# Patient Record
Sex: MALE | Race: WHITE | Employment: FULL TIME | URBAN - METROPOLITAN AREA
[De-identification: names, ages, dates, MRNs, and addresses within clinical notes are randomized per-mention and may not be internally consistent; named-entity substitution may affect disease eponyms.]

---

## 2019-12-04 ENCOUNTER — LAB ENCOUNTER (OUTPATIENT)
Dept: LAB | Age: 59
End: 2019-12-04
Attending: DERMATOLOGY
Payer: COMMERCIAL

## 2019-12-04 DIAGNOSIS — L40.0 PSORIASIS VULGARIS: Primary | ICD-10-CM

## 2019-12-04 DIAGNOSIS — Z79.899 ENCOUNTER FOR LONG-TERM (CURRENT) USE OF OTHER MEDICATIONS: ICD-10-CM

## 2019-12-04 PROCEDURE — 87389 HIV-1 AG W/HIV-1&-2 AB AG IA: CPT

## 2019-12-04 PROCEDURE — 86038 ANTINUCLEAR ANTIBODIES: CPT

## 2019-12-04 PROCEDURE — 86704 HEP B CORE ANTIBODY TOTAL: CPT

## 2019-12-04 PROCEDURE — 86480 TB TEST CELL IMMUN MEASURE: CPT

## 2019-12-04 PROCEDURE — 86803 HEPATITIS C AB TEST: CPT

## 2019-12-04 PROCEDURE — 86225 DNA ANTIBODY NATIVE: CPT

## 2019-12-04 PROCEDURE — 86235 NUCLEAR ANTIGEN ANTIBODY: CPT

## 2019-12-04 PROCEDURE — 80053 COMPREHEN METABOLIC PANEL: CPT

## 2019-12-04 PROCEDURE — 86706 HEP B SURFACE ANTIBODY: CPT

## 2019-12-04 PROCEDURE — 85025 COMPLETE CBC W/AUTO DIFF WBC: CPT

## 2019-12-04 PROCEDURE — 36415 COLL VENOUS BLD VENIPUNCTURE: CPT

## 2019-12-04 PROCEDURE — 87340 HEPATITIS B SURFACE AG IA: CPT

## 2019-12-04 PROCEDURE — 86039 ANTINUCLEAR ANTIBODIES (ANA): CPT

## 2020-05-20 ENCOUNTER — TELEMEDICINE (OUTPATIENT)
Dept: TELEHEALTH | Age: 60
End: 2020-05-20

## 2020-05-20 DIAGNOSIS — Z02.9 ENCOUNTERS FOR ADMINISTRATIVE PURPOSE: Primary | ICD-10-CM

## 2020-05-21 NOTE — VIDEO VISIT ROUTING COMMENT
Pts primary is currently not in the office, Primary is at [facility], pt is requesting COVID testing. Symptoms of + contact at work, and dry cough with fatigue.  Pt and wife advised to seek testing thru Primary OR at the [facility], [facility]

## 2020-09-16 ENCOUNTER — OFFICE VISIT (OUTPATIENT)
Dept: NEUROLOGY | Facility: CLINIC | Age: 60
End: 2020-09-16
Payer: COMMERCIAL

## 2020-09-16 VITALS
RESPIRATION RATE: 18 BRPM | HEART RATE: 70 BPM | BODY MASS INDEX: 37 KG/M2 | SYSTOLIC BLOOD PRESSURE: 138 MMHG | WEIGHT: 264 LBS | DIASTOLIC BLOOD PRESSURE: 78 MMHG

## 2020-09-16 DIAGNOSIS — K76.0 FATTY LIVER: ICD-10-CM

## 2020-09-16 DIAGNOSIS — G70.00 MYASTHENIA GRAVIS (HCC): Primary | ICD-10-CM

## 2020-09-16 DIAGNOSIS — R73.09 ELEVATED HEMOGLOBIN A1C: ICD-10-CM

## 2020-09-16 PROCEDURE — 3075F SYST BP GE 130 - 139MM HG: CPT | Performed by: OTHER

## 2020-09-16 PROCEDURE — 99244 OFF/OP CNSLTJ NEW/EST MOD 40: CPT | Performed by: OTHER

## 2020-09-16 PROCEDURE — 3078F DIAST BP <80 MM HG: CPT | Performed by: OTHER

## 2020-09-16 RX ORDER — AZATHIOPRINE 50 MG/1
TABLET ORAL 2 TIMES DAILY
COMMUNITY
Start: 2020-09-01 | End: 2020-10-09

## 2020-09-16 RX ORDER — PYRIDOSTIGMINE BROMIDE 60 MG/1
60 TABLET ORAL 3 TIMES DAILY
COMMUNITY
Start: 2020-08-17 | End: 2020-10-21

## 2020-09-16 RX ORDER — PREDNISONE 20 MG/1
40 TABLET ORAL DAILY
COMMUNITY
Start: 2020-08-31 | End: 2020-09-18

## 2020-09-16 RX ORDER — LISINOPRIL 20 MG/1
20 TABLET ORAL DAILY
COMMUNITY
Start: 2020-08-04 | End: 2020-11-13 | Stop reason: DRUGHIGH

## 2020-09-16 NOTE — PATIENT INSTRUCTIONS
Refill policies:    • Allow 2-3 business days for refills; controlled substances may take longer.   • Contact your pharmacy at least 5 days prior to running out of medication and have them send an electronic request or submit request through the “request re Depending on your insurance carrier, approval may take 3-10 days. It is highly recommended patients contact their insurance carrier directly to determine coverage.   If test is done without insurance authorization, patient may be responsible for the entire to the staff in our  [place] office so that your concerns may be promptly addressed.   We are available through [employer] or at the numbers below:    The phone number is:   [phone] option #1    The fax number is:  [phone]    Your pharmacy judie

## 2020-09-16 NOTE — PROGRESS NOTES
[address]   Neurology- INITIAL CLINIC VISIT  2020, 10:43 AM     Sangeeta Davila Patient Status:  No patient class for patient encounter    1960 MRN [id number]   [facility] during this  MRI brain- no acute intracranial abnormality.   TSH 2020- 1.4  CTA head- no stenosis or occlusion  A1c 6.6 7/29/20    CBC  8.1 7.8 8.7   4.9 5.1 5.4   15.5 15.5 16.8   46.4 46.8 50.0   94 92 93   31 31 31   33 33 34   168 163 183   12.0 11.4 12 reports that he quit smoking about 22 years ago. His smoking use included cigarettes. He has a 20.00 pack-year smoking history. He has never used smokeless tobacco. He reports current alcohol use. He reports that he does not use drugs.     Allergies:  No Kn trouble performing my personal grooming needs due to my MG [x] [] []     TOTAL SCORE:  out of 30           PHYSICAL EXAM:   Neurologic Exam  Vitals   09/16/20  1031   BP: 138/78   Pulse: 70   Resp: 18     General Appearance: Patient is a 56year old male i slightly complicated by history of fatty liver disease and elevated liver enzymes, which we need to check and monitor since he has been started on Imuran  Discussed acute and long term treatments of MG  At this time, would continue the Imuran 50mg BID, and

## 2020-09-17 ENCOUNTER — ORDER TRANSCRIPTION (OUTPATIENT)
Dept: ADMINISTRATIVE | Facility: HOSPITAL | Age: 60
End: 2020-09-17

## 2020-09-17 DIAGNOSIS — K76.0 FATTY LIVER: Primary | ICD-10-CM

## 2020-09-18 DIAGNOSIS — G70.00 MYASTHENIA GRAVIS (HCC): Primary | ICD-10-CM

## 2020-09-18 RX ORDER — PREDNISONE 20 MG/1
TABLET ORAL
Qty: 164 TABLET | Refills: 0 | Status: SHIPPED | OUTPATIENT
Start: 2020-09-18 | End: 2020-10-09 | Stop reason: DRUGHIGH

## 2020-09-18 NOTE — TELEPHONE ENCOUNTER
Dr Calli Chisholm dosage of Prednisone to 50 mg and pt will be out before 30 days; pt req new script of Prednisone with the increased dosage

## 2020-09-18 NOTE — TELEPHONE ENCOUNTER
Medication: Prednisone    Date of last refill:    [place]     Last office visit: 9/16/2020  Due back to clinic per last office note:  4 weeks  Date next office visit scheduled:    Future Appointments   Date Time Provider

## 2020-09-19 ENCOUNTER — PATIENT MESSAGE (OUTPATIENT)
Dept: NEUROLOGY | Facility: CLINIC | Age: 60
End: 2020-09-19

## 2020-09-21 NOTE — TELEPHONE ENCOUNTER
Pt needs the paperwork sooner than 2 weeks. Pt also needs a release date to go back to work from Dr. Micheal Fry. Also patient has been running a slight fever for two days.   Pt wants to know if it is because of taking the imuron

## 2020-09-21 NOTE — TELEPHONE ENCOUNTER
FMLA paper work printed and placed in appropriate area of nurse station. Completed paper work is to be returned to patient, so ALVINO is not needed.

## 2020-09-21 NOTE — TELEPHONE ENCOUNTER
At this time it's hard for me to say whether he can tolerate driving for 15 miles and working a whole day, in terms of his vision- he may need to wait another 1-2 weeks and see how he feels.  I can sign paperwork now and say return to work in 2 weeks, but i

## 2020-09-21 NOTE — TELEPHONE ENCOUNTER
LMTCB to discuss paperwork. Per Dr. Marietta Young, have symptoms improved to where patient feels comfortable driving or returning to work?

## 2020-09-21 NOTE — TELEPHONE ENCOUNTER
From: Corbin Watkins  To: Maxine Leal DO  Sent: 9/19/2020 10:50 AM CDT  Subject: Other    Please see attached FMLA forms to be completed and sent back to my employer

## 2020-09-21 NOTE — TELEPHONE ENCOUNTER
From: Korey Layton  To: Joe Salazar DO  Sent: 9/19/2020 10:52 AM CDT  Subject: Other    fmla message 2 of 2

## 2020-09-21 NOTE — TELEPHONE ENCOUNTER
S-call transferred by [facility] staff. Return call for condition update    B-being followed for Myasthenia Gravis. LOV 9/16/2020. On Imuran. A-discussed below with pt. States he has improved overall. Double vision has resolved.  Eyes will fill tired/strained

## 2020-09-22 ENCOUNTER — TELEPHONE (OUTPATIENT)
Dept: NEUROLOGY | Facility: CLINIC | Age: 60
End: 2020-09-22

## 2020-09-22 DIAGNOSIS — G70.00 MYASTHENIA GRAVIS (HCC): Primary | ICD-10-CM

## 2020-09-22 NOTE — TELEPHONE ENCOUNTER
S: Sick call    B: LOV 9/16/20  Seropositive MG, transferring care  Care slightly complicated by history of fatty liver disease and elevated liver enzymes, which we need to check and monitor since he has been started on Imuran  Discussed acute and long ter

## 2020-09-22 NOTE — TELEPHONE ENCOUNTER
Tried calling the patient. Although I was not able to reach him to further discuss/evaluate, the constellation of symptoms are general and could be one of many things.  I recommend going to the nearest ED or urgent care, and I left a message on his voicemai

## 2020-09-23 NOTE — TELEPHONE ENCOUNTER
Got a hold of patient. Discussed stop Imuran as the nausea and fevers may have been due to Imuran- improved when he stopped it. Continue prednisone 50mg for now, and increase mestinon to 60mg q 3-4 hours, QID.  Labs Friday, seeing ophtho this week as well f

## 2020-09-23 NOTE — TELEPHONE ENCOUNTER
Patient states he is asymptomatic today except for some fatigue. Patient states he was COVID +in May and is wondering if his symptoms had to do with COVID because his wife was COVID + in February and had short bout of fever 4 months later.      Patient verb

## 2020-09-23 NOTE — TELEPHONE ENCOUNTER
Called patient again and could not reach. Left VM that would still check with PCP on potential cause of fever. If he has stopped Imuran and symptoms resolved since then, it may be a flu like reaction to Imuran.  If any symptoms are persisting, would stop pr

## 2020-09-24 NOTE — TELEPHONE ENCOUNTER
Please add to referral:    Dose of  0.4g/kg daily x 5 days is induction dose  Following doses, to be given every 3 weeks, would be 1g/kg given over several hours     First treatment to be given at infusion center.  Patient was asking about home infusions- i

## 2020-09-25 ENCOUNTER — LAB ENCOUNTER (OUTPATIENT)
Dept: LAB | Age: 60
End: 2020-09-25
Attending: Other
Payer: COMMERCIAL

## 2020-09-25 DIAGNOSIS — G70.00 MYASTHENIA GRAVIS (HCC): ICD-10-CM

## 2020-09-25 LAB
ALBUMIN SERPL-MCNC: 3 G/DL (ref 3.4–5)
ALBUMIN/GLOB SERPL: 0.7 {RATIO} (ref 1–2)
ALP LIVER SERPL-CCNC: 105 U/L
ALT SERPL-CCNC: 83 U/L
ANION GAP SERPL CALC-SCNC: 3 MMOL/L (ref 0–18)
AST SERPL-CCNC: 53 U/L (ref 15–37)
BASOPHILS # BLD: 0.14 X10(3) UL (ref 0–0.2)
BASOPHILS NFR BLD: 1 %
BILIRUB SERPL-MCNC: 0.6 MG/DL (ref 0.1–2)
BUN BLD-MCNC: 18 MG/DL (ref 7–18)
BUN/CREAT SERPL: 19.6 (ref 10–20)
CALCIUM BLD-MCNC: 9.8 MG/DL (ref 8.5–10.1)
CHLORIDE SERPL-SCNC: 100 MMOL/L (ref 98–112)
CO2 SERPL-SCNC: 32 MMOL/L (ref 21–32)
CREAT BLD-MCNC: 0.92 MG/DL
DEPRECATED RDW RBC AUTO: 47.1 FL (ref 35.1–46.3)
EOSINOPHIL # BLD: 1.35 X10(3) UL (ref 0–0.7)
EOSINOPHIL NFR BLD: 10 %
ERYTHROCYTE [DISTWIDTH] IN BLOOD BY AUTOMATED COUNT: 13.9 % (ref 11–15)
GLOBULIN PLAS-MCNC: 4.5 G/DL (ref 2.8–4.4)
GLUCOSE BLD-MCNC: 138 MG/DL (ref 70–99)
HCT VFR BLD AUTO: 45.4 %
HGB BLD-MCNC: 15.1 G/DL
LYMPHOCYTES NFR BLD: 16 %
LYMPHOCYTES NFR BLD: 2.16 X10(3) UL (ref 1–4)
M PROTEIN MFR SERPL ELPH: 7.5 G/DL (ref 6.4–8.2)
MCH RBC QN AUTO: 31.1 PG (ref 26–34)
MCHC RBC AUTO-ENTMCNC: 33.3 G/DL (ref 31–37)
MCV RBC AUTO: 93.4 FL
METAMYELOCYTES # BLD: 0.14 X10(3) UL
METAMYELOCYTES NFR BLD: 1 %
MONOCYTES # BLD: 0.41 X10(3) UL (ref 0.1–1)
MONOCYTES NFR BLD: 3 %
MORPHOLOGY: NORMAL
MYELOCYTES # BLD: 0.14 X10(3) UL
MYELOCYTES NFR BLD: 1 %
NEUTROPHILS # BLD AUTO: 8.55 X10 (3) UL (ref 1.5–7.7)
NEUTROPHILS NFR BLD: 61 %
NEUTS BAND NFR BLD: 7 %
NEUTS HYPERSEG # BLD: 9.18 X10(3) UL (ref 1.5–7.7)
OSMOLALITY SERPL CALC.SUM OF ELEC: 284 MOSM/KG (ref 275–295)
PATIENT FASTING Y/N/NP: YES
PLATELET # BLD AUTO: 170 10(3)UL (ref 150–450)
PLATELET MORPHOLOGY: NORMAL
POTASSIUM SERPL-SCNC: 3.7 MMOL/L (ref 3.5–5.1)
RBC # BLD AUTO: 4.86 X10(6)UL
SODIUM SERPL-SCNC: 135 MMOL/L (ref 136–145)
TOTAL CELLS COUNTED: 100
WBC # BLD AUTO: 13.5 X10(3) UL (ref 4–11)

## 2020-09-25 PROCEDURE — 85007 BL SMEAR W/DIFF WBC COUNT: CPT | Performed by: OTHER

## 2020-09-25 PROCEDURE — 85027 COMPLETE CBC AUTOMATED: CPT | Performed by: OTHER

## 2020-09-25 PROCEDURE — 80053 COMPREHEN METABOLIC PANEL: CPT | Performed by: OTHER

## 2020-09-25 PROCEDURE — 36415 COLL VENOUS BLD VENIPUNCTURE: CPT | Performed by: OTHER

## 2020-09-25 NOTE — TELEPHONE ENCOUNTER
Called [facility] and spoke with Cyrus Santo. For codes  and 52423 for outpatient facility ONLY no authorization or predetermination required.     If Home infusion the place that comes in to do that would have to call to see if authorization is require

## 2020-09-28 ENCOUNTER — TELEPHONE (OUTPATIENT)
Dept: NEUROLOGY | Facility: CLINIC | Age: 60
End: 2020-09-28

## 2020-09-28 RX ORDER — PREDNISONE 20 MG/1
20 TABLET ORAL 2 TIMES DAILY
Qty: 1 TABLET | Refills: 0 | COMMUNITY
Start: 2020-09-28 | End: 2020-10-21

## 2020-09-28 NOTE — TELEPHONE ENCOUNTER
Spoke with patient and he agreed to have [address] and orders sent to [facility]. Start paperowrk compledted and placed in providers folder for reivew and signature.       Printed LOV, insurance cards, facesheet, and [facility] lab results and placed with start orde

## 2020-09-28 NOTE — TELEPHONE ENCOUNTER
Patient reported he has decreased his dose of Prednisone to 40 mg per day. Patient reported to RN in TE r/t IVIG that he did this on his own and states he feels better. Medication list updated and routed to provider to inform.

## 2020-09-28 NOTE — TELEPHONE ENCOUNTER
LMTCB. Per patient insurance, patient will need to go to freestanding facility for outpatient IVIG. Once facility selected, PA will need to be completed by selected outpatient facility and start forms and orders will need to be faxed.     Marky mcguire

## 2020-09-28 NOTE — TELEPHONE ENCOUNTER
Start paperwork signed by provider and faxed to [address]. Fax confirmation received. F/U email also sent to [facility] with [address] requesting she update NICOLE with any issues pertaining to IVIG for patient and when patient will be starting.

## 2020-09-29 NOTE — TELEPHONE ENCOUNTER
Rec'd printed medication order from [address] [place] [address] for IVIG home infusion. Require review and signature from Dr. Fredrick Villarreal. Placed in Dr. Migdalia Ramirez folder.

## 2020-10-01 ENCOUNTER — TELEPHONE (OUTPATIENT)
Dept: NEUROLOGY | Facility: CLINIC | Age: 60
End: 2020-10-01

## 2020-10-01 NOTE — TELEPHONE ENCOUNTER
See alternate Mychart encounters. Paperwork completed, signed by Dr. Sangeeta Dawn and faxed to patient's . Copy to scan.

## 2020-10-09 ENCOUNTER — OFFICE VISIT (OUTPATIENT)
Dept: NEUROLOGY | Facility: CLINIC | Age: 60
End: 2020-10-09
Payer: COMMERCIAL

## 2020-10-09 VITALS
RESPIRATION RATE: 18 BRPM | BODY MASS INDEX: 37 KG/M2 | SYSTOLIC BLOOD PRESSURE: 142 MMHG | HEART RATE: 70 BPM | DIASTOLIC BLOOD PRESSURE: 80 MMHG | WEIGHT: 262 LBS

## 2020-10-09 DIAGNOSIS — G70.00 MYASTHENIA GRAVIS (HCC): Primary | ICD-10-CM

## 2020-10-09 PROCEDURE — 3077F SYST BP >= 140 MM HG: CPT | Performed by: OTHER

## 2020-10-09 PROCEDURE — 99215 OFFICE O/P EST HI 40 MIN: CPT | Performed by: OTHER

## 2020-10-09 PROCEDURE — 3079F DIAST BP 80-89 MM HG: CPT | Performed by: OTHER

## 2020-10-09 RX ORDER — PROPRANOLOL/HYDROCHLOROTHIAZID 40 MG-25MG
1 TABLET ORAL EVERY EVENING
COMMUNITY

## 2020-10-09 RX ORDER — CHLORAL HYDRATE 500 MG
1000 CAPSULE ORAL DAILY
COMMUNITY

## 2020-10-09 NOTE — PROGRESS NOTES
[facility] [address]27   Neurology- INITIAL CLINIC VISIT  2020, 10:43 AM     Shannan San Patient Status:  No patient class for patient encounter    1960 MRN [id number]   [facility] diplopia. Walking can trigger it, as can focusing on something visually. Taking mestinon 60/60/60/30mg. His speech is less raspy. Diplopia is improved with 40mg of prednisone. Balance is a little worse, L knee is very painful.  Going upstairs feels fatiguin • Microcytic anemia 8/27/2009   • Myasthenia gravis ([facility] [address]     7/2020   • OTHER DISEASES    • Prostate nodule 8/27/2009   • Psoriasis         Past Surgical History:  Past Surgical History:   Procedure Laterality Date   • COLONOSCOPY  2009    normal with d assess the patient's symptoms and their impact on daily-living function:       Not at all=0 Somewhat=1 Very much=2   I am frustrated by my MG [] [x] []   I have trouble with my eyes because of my MG  (eg double vision) [] [] [x]   I have trouble eating bec normal. No dysarthria today. Motor exam revealed normal muscle bulk and tone. No atrophy or fasciculations.  Manual muscle testing revealed MRC grade 4/5 strength in L WE and triceps, HF 4+/5, o/w 5/5   Deep tendon reflexes were 2 at the biceps, brachiorad

## 2020-10-09 NOTE — PROGRESS NOTES
Left eye pressure. Patient denies double vision. Increase in fatigue. Denies dizziness. Slight mental fog.

## 2020-10-13 NOTE — TELEPHONE ENCOUNTER
Received incoming fax from [facility] requiring additional provider signature. Placed in provider folder for review and signature.

## 2020-10-14 NOTE — TELEPHONE ENCOUNTER
New order signed by Dr. Joseph Talavera and faxed back to [facility] with fax confirmation rec'd.

## 2020-10-20 NOTE — TELEPHONE ENCOUNTER
IVIG approved 10/2/2020-10/2/2021 for 18 Days/Units.  #[id number], treatment setting-Home, provided by [facility]

## 2020-10-21 ENCOUNTER — TELEPHONE (OUTPATIENT)
Dept: NEUROLOGY | Facility: CLINIC | Age: 60
End: 2020-10-21

## 2020-10-21 DIAGNOSIS — G70.00 MYASTHENIA GRAVIS (HCC): Primary | ICD-10-CM

## 2020-10-21 RX ORDER — PYRIDOSTIGMINE BROMIDE 60 MG/1
60 TABLET ORAL
Qty: 150 TABLET | Refills: 3 | Status: SHIPPED | OUTPATIENT
Start: 2020-10-21 | End: 2020-12-23

## 2020-10-21 RX ORDER — PREDNISONE 50 MG/1
50 TABLET ORAL DAILY
Qty: 30 TABLET | Refills: 0 | Status: ON HOLD | OUTPATIENT
Start: 2020-10-21 | End: 2020-12-09

## 2020-10-21 NOTE — TELEPHONE ENCOUNTER
Pt requesting refill of prednisone and mestinon. Per LOV notes on 9/18/20, pt to increase Prednisone to 50mg daily and Mestinon 60mg five times a day. Spoke with patient and he confirmed these are the doses he is taking. Rxs pended for review.     Medica

## 2020-10-21 NOTE — TELEPHONE ENCOUNTER
OP infusion center calling with patient update. Pt presented for third OP IVIG infusion this afternoon and BP is higher than it has been, at 180/100.   Pts BP has been running high during previous infusions, but not this high - typically around 160/mid-80s

## 2020-10-22 ENCOUNTER — TELEPHONE (OUTPATIENT)
Dept: NEUROLOGY | Facility: CLINIC | Age: 60
End: 2020-10-22

## 2020-10-22 NOTE — TELEPHONE ENCOUNTER
Noted, thanks. If they let us know it is still high, I would recommend further slowing down infusion.

## 2020-10-22 NOTE — TELEPHONE ENCOUNTER
LMTCB- patient will need to request HR department to send new paperwork to NICOLE. PER LOV - continue to be off work. Patient called NICOLE to request additional week of FMLA.     Per LOV on 10/9/2020 - patient to continue to stay off of work    Discussio

## 2020-10-22 NOTE — TELEPHONE ENCOUNTER
Received notifiation from [facility] to call patient back. Attempted to call back -  left.

## 2020-10-23 NOTE — TELEPHONE ENCOUNTER
Per PSR note from 10/23/2020 - Patient needs note to inform employer how much longer patient needs to be off work and indicate any restrictions once he will return.     Per notes below, patient reports he needs at least one week off longer and per provider

## 2020-10-26 NOTE — TELEPHONE ENCOUNTER
GIOVANNI on VM (ok per HIPAA consent) to confirm exact date patient would like to return to work so that letter can be pended and sent to Dr Anayeli Hearn.

## 2020-10-26 NOTE — TELEPHONE ENCOUNTER
Work letter faxed to # provided by patient with fax confirmation received. Patient notified via [address]

## 2020-10-26 NOTE — TELEPHONE ENCOUNTER
I believe I recommended off until next clinic visit, but if he feels arm strength is back to normal, no longer having double vision or drooping eyelid during the day, I would be ok with 1 week.

## 2020-10-26 NOTE — TELEPHONE ENCOUNTER
Patient states he would like to return to work on 11/9/2020. Letter pended for MD review and signature. Patient is gong to call back with fax # letter is to be faxed to. Patient to be notified via [address] when letter faxed. 15-Franky-2017 05:15

## 2020-10-29 ENCOUNTER — OFFICE VISIT (OUTPATIENT)
Dept: FAMILY MEDICINE CLINIC | Facility: CLINIC | Age: 60
End: 2020-10-29
Payer: COMMERCIAL

## 2020-10-29 VITALS
RESPIRATION RATE: 20 BRPM | SYSTOLIC BLOOD PRESSURE: 184 MMHG | BODY MASS INDEX: 35.74 KG/M2 | TEMPERATURE: 97 F | HEIGHT: 71.5 IN | DIASTOLIC BLOOD PRESSURE: 90 MMHG | HEART RATE: 100 BPM | WEIGHT: 261 LBS

## 2020-10-29 DIAGNOSIS — Z00.00 LABORATORY TESTS ORDERED AS PART OF A COMPLETE PHYSICAL EXAM (CPE): ICD-10-CM

## 2020-10-29 DIAGNOSIS — G47.9 SLEEP DIFFICULTIES: ICD-10-CM

## 2020-10-29 DIAGNOSIS — I10 ESSENTIAL HYPERTENSION: ICD-10-CM

## 2020-10-29 DIAGNOSIS — L40.9 PSORIASIS: ICD-10-CM

## 2020-10-29 DIAGNOSIS — G70.00 MYASTHENIA GRAVIS (HCC): Primary | ICD-10-CM

## 2020-10-29 DIAGNOSIS — M62.81 MUSCLE WEAKNESS: ICD-10-CM

## 2020-10-29 DIAGNOSIS — Z86.16 HISTORY OF 2019 NOVEL CORONAVIRUS DISEASE (COVID-19): ICD-10-CM

## 2020-10-29 PROCEDURE — 3077F SYST BP >= 140 MM HG: CPT | Performed by: FAMILY MEDICINE

## 2020-10-29 PROCEDURE — 3080F DIAST BP >= 90 MM HG: CPT | Performed by: FAMILY MEDICINE

## 2020-10-29 PROCEDURE — 3008F BODY MASS INDEX DOCD: CPT | Performed by: FAMILY MEDICINE

## 2020-10-29 PROCEDURE — 99204 OFFICE O/P NEW MOD 45 MIN: CPT | Performed by: FAMILY MEDICINE

## 2020-10-29 RX ORDER — LISINOPRIL 20 MG/1
20 TABLET ORAL 2 TIMES DAILY
Qty: 60 TABLET | Refills: 1 | Status: SHIPPED | OUTPATIENT
Start: 2020-10-29 | End: 2020-11-13

## 2020-10-29 RX ORDER — TEMAZEPAM 15 MG/1
15 CAPSULE ORAL NIGHTLY PRN
Qty: 30 CAPSULE | Refills: 1 | Status: SHIPPED | OUTPATIENT
Start: 2020-10-29 | End: 2021-09-04

## 2020-10-29 NOTE — PATIENT INSTRUCTIONS
Increase lisinopril 20 mg to 2 tablets a day. Monitor blood pressure at home and forward readings in 7 to 10 days. Low-salt diet. Continue treatment as per neurologist.  Keep active as tolerated.   Schedule complete physical for beginning of December 202

## 2020-10-30 ENCOUNTER — MED REC SCAN ONLY (OUTPATIENT)
Dept: FAMILY MEDICINE CLINIC | Facility: CLINIC | Age: 60
End: 2020-10-30

## 2020-10-30 NOTE — PROGRESS NOTES
Alexsandra Rae is a 56year old male. cc myasthenia gravis, muscle weakness, hypertension, psoriasis, sleeping difficulties, obstructive sleep apnea. HPI:   Patient is coming to the office to establish care.   In May of this year he was diagnosed with fatty acids 1000 MG Oral Cap Take 1,000 mg by mouth daily. • Turmeric 500 MG Oral Cap Take by mouth. • Secukinumab 150 MG/ML Subcutaneous Solution Auto-injector Inject 150 mg into the skin 2 (two) times a day.      • lisinopril 20 MG Oral Tab Take 2 mood     Results for orders placed or performed in visit on 83/86/28   COMP METABOLIC PANEL (14)   Result Value Ref Range    Glucose 138 (H) 70 - 99 mg/dL    Sodium 135 (L) 136 - 145 mmol/L    Potassium 3.7 3.5 - 5.1 mmol/L    Chloride 100 98 - 112 mmol/L RDW-SD 47.1 (H) 35.1 - 46.3 fL    Neutrophil Absolute Prelim 8.55 (H) 1.50 - 7.70 x10 (3) uL     ASSESSMENT AND PLAN:     Myasthenia gravis ([facility])  (primary encounter diagnosis)  Muscle weakness  Psoriasis  Essential hypertension  Sleep difficulties  Histor

## 2020-11-05 ENCOUNTER — TELEPHONE (OUTPATIENT)
Dept: NEUROLOGY | Facility: CLINIC | Age: 60
End: 2020-11-05

## 2020-11-05 NOTE — TELEPHONE ENCOUNTER
LMTCB. 10/9/2020 - LOV  Noted to stay off work for now. 10/22/2020 - patient called and wanted to be off only 1 additional week. Letter sent to patient's work.     Need to discuss FMLA paperwork with patient or provider letter to stay off work until

## 2020-11-06 NOTE — TELEPHONE ENCOUNTER
Faxed copy of letter signed by provider to patient's HR department with fax confirmation. Copy of letter in patient's MyChart. Called patient and left detailed message on  informing him that letter has been sent.

## 2020-11-06 NOTE — TELEPHONE ENCOUNTER
Patient requesting letter to remain off work until next [address] on 11/19/2020. He states he worked outside yesterday and he reports he is extremely fatigued and is not sure how he will go back to work next week. Pended letter for review and signature.   Will

## 2020-11-13 ENCOUNTER — TELEPHONE (OUTPATIENT)
Dept: FAMILY MEDICINE CLINIC | Facility: CLINIC | Age: 60
End: 2020-11-13

## 2020-11-13 DIAGNOSIS — I10 ESSENTIAL HYPERTENSION: ICD-10-CM

## 2020-11-13 RX ORDER — LISINOPRIL 20 MG/1
TABLET ORAL
Qty: 90 TABLET | Refills: 1 | Status: ON HOLD | OUTPATIENT
Start: 2020-11-13 | End: 2020-12-09

## 2020-11-13 NOTE — TELEPHONE ENCOUNTER
Lets increase patient lisinopril 40 mg in the morning 20 at night. Continue monitoring blood pressure and forward results next Thursday to our office. Low-salt diet.   Thank you

## 2020-11-13 NOTE — TELEPHONE ENCOUNTER
[address] currently admin IVIG to pt for MS, tolerating well  BP has been elevated since yesterday 160s/100s  Pt asymptomatic    He is on Lisinopril 20 mg BID   On Pred 50 mg daily (for MS)    Cannot have BB d/t on Mestinon     Pls advise  Thank you

## 2020-11-17 ENCOUNTER — TELEPHONE (OUTPATIENT)
Dept: NEUROLOGY | Facility: CLINIC | Age: 60
End: 2020-11-17

## 2020-11-17 NOTE — TELEPHONE ENCOUNTER
Received Attending Physician's Initial Statement of Disability Paperwork dated 11/17/20; placed in nurses bin for completion

## 2020-11-19 ENCOUNTER — TELEPHONE (OUTPATIENT)
Dept: NEUROLOGY | Facility: CLINIC | Age: 60
End: 2020-11-19

## 2020-11-19 ENCOUNTER — OFFICE VISIT (OUTPATIENT)
Dept: NEUROLOGY | Facility: CLINIC | Age: 60
End: 2020-11-19
Payer: COMMERCIAL

## 2020-11-19 VITALS
DIASTOLIC BLOOD PRESSURE: 90 MMHG | WEIGHT: 268 LBS | BODY MASS INDEX: 37 KG/M2 | HEART RATE: 90 BPM | RESPIRATION RATE: 20 BRPM | SYSTOLIC BLOOD PRESSURE: 142 MMHG

## 2020-11-19 DIAGNOSIS — G70.00 MYASTHENIA GRAVIS (HCC): Primary | ICD-10-CM

## 2020-11-19 PROCEDURE — 99215 OFFICE O/P EST HI 40 MIN: CPT | Performed by: OTHER

## 2020-11-19 PROCEDURE — 99072 ADDL SUPL MATRL&STAF TM PHE: CPT | Performed by: OTHER

## 2020-11-19 PROCEDURE — 3080F DIAST BP >= 90 MM HG: CPT | Performed by: OTHER

## 2020-11-19 PROCEDURE — 3077F SYST BP >= 140 MM HG: CPT | Performed by: OTHER

## 2020-11-19 RX ORDER — MYCOPHENOLATE MOFETIL 500 MG/1
TABLET ORAL
Qty: 60 TABLET | Refills: 0 | Status: SHIPPED | OUTPATIENT
Start: 2020-11-19 | End: 2020-12-23

## 2020-11-19 NOTE — PROGRESS NOTES
[address]   Neurology- INITIAL CLINIC VISIT  2020, 10:43 AM     Haritha Gracia Patient Status:  No patient class for patient encounter    1960 MRN [id number]   [facility] worse with diplopia. Walking can trigger it, as can focusing on something visually. Taking mestinon 60/60/60/30mg. His speech is less raspy. Diplopia is improved with 40mg of prednisone. Balance is a little worse, L knee is very painful.  Going upstairs fee       >=60 98    ALT (SGPT)      16 - 61 U/L 109 (H) 87 (H)   AST (SGOT)      15 - 37 U/L 77 (H) 50 (H)       Past Medical History:  Past Medical History:   Diagnosis Date   • Lab test positive for detection of COVID-19 virus     5/2020, jackson capsule, Rfl: 1  •  predniSONE 50 MG Oral Tab, Take 1 tablet (50 mg total) by mouth daily. , Disp: 30 tablet, Rfl: 0  •  Pyridostigmine Bromide 60 MG Oral Tab, Take 1 tablet (60 mg total) by mouth 5 (five) times daily.  60, 60, 60, 30, Disp: 150 tablet, Rfl: flexor bilaterally. Sensory exam revealed normal light touch perception. Vibratory perception and proprioception were intact at the toes. Pinprick and temperature were normal. Romberg sign was absent. Complex motor skills revealed normal coordination.  [facility]  [address] [address]

## 2020-11-23 ENCOUNTER — TELEPHONE (OUTPATIENT)
Dept: PHYSICAL THERAPY | Facility: HOSPITAL | Age: 60
End: 2020-11-23

## 2020-11-23 NOTE — TELEPHONE ENCOUNTER
Patient called back and is unsure what to do about working status. Per letter that was written by provider on 11/19/2020 - patient can attempt to RTW with limitations beginning on 11/30/2020.     Strongly advised patient to contact his HR department to d

## 2020-11-23 NOTE — TELEPHONE ENCOUNTER
From Alternate TE dated 11/19/2020:    Called patient to verify that he understood he needed a FCT/FCE test prior to provider completing paperwork.   Patient states \"I think I have that scheduled, but I don't know\"  Offered to [place] alternate FCT testin

## 2020-11-23 NOTE — TELEPHONE ENCOUNTER
Called patient to verify that he understood he needed a FCT/FCE test prior to provider completing paperwork. Patient states \"I think I have that scheduled, but I don't know\"  Offered to MyChart alternate FCT testing locations. Patient very appreciative.

## 2020-11-24 ENCOUNTER — TELEPHONE (OUTPATIENT)
Dept: NEUROLOGY | Facility: CLINIC | Age: 60
End: 2020-11-24

## 2020-11-24 RX ORDER — PREDNISONE 50 MG/1
TABLET ORAL
Qty: 10 TABLET | Refills: 0 | Status: ON HOLD | OUTPATIENT
Start: 2020-11-24 | End: 2020-12-09

## 2020-11-24 RX ORDER — PREDNISONE 20 MG/1
TABLET ORAL
Qty: 60 TABLET | Refills: 1 | Status: ON HOLD | OUTPATIENT
Start: 2020-11-24 | End: 2020-12-09

## 2020-11-24 NOTE — TELEPHONE ENCOUNTER
Medication: predniSONE 50 MG Oral Tab    Date of last refill: 10/21/2020 (#30/0)  [place]    Last office visit: 11/19/2020  Due back to clinic per last office note:  4 weeks  Date next office visit scheduled:     Shane

## 2020-11-25 ENCOUNTER — TELEPHONE (OUTPATIENT)
Dept: NEUROLOGY | Facility: CLINIC | Age: 60
End: 2020-11-25

## 2020-11-25 NOTE — TELEPHONE ENCOUNTER
Dr Matthew Burgos signed [facility] orders- faxed to [facility] @ [address] [place] [address] with fax confirmation received. Sent to scanning.

## 2020-11-30 ENCOUNTER — TELEPHONE (OUTPATIENT)
Dept: NEUROLOGY | Facility: CLINIC | Age: 60
End: 2020-11-30

## 2020-11-30 NOTE — TELEPHONE ENCOUNTER
Spoke with Diamond Lindo, PT who states the FCT order has 8 visits. Clarified that it is for 1 visit for assessment only.

## 2020-12-02 ENCOUNTER — TELEPHONE (OUTPATIENT)
Dept: NEUROLOGY | Facility: CLINIC | Age: 60
End: 2020-12-02

## 2020-12-03 NOTE — TELEPHONE ENCOUNTER
Pt would like to know if there are any work limitations from the PT he had; pls advise; call pt at [phone]

## 2020-12-04 ENCOUNTER — TELEPHONE (OUTPATIENT)
Dept: NEUROLOGY | Facility: CLINIC | Age: 60
End: 2020-12-04

## 2020-12-04 NOTE — TELEPHONE ENCOUNTER
Patient getting home IVIG infusions. Home infusion RN states that pateint's BP is high at the start of IVIG infusion. · Was given 500 ml NS prior to injection starting. 10 minutes into the NS infusion, patient's BP was 168/98.     Last infusion on 10/

## 2020-12-04 NOTE — TELEPHONE ENCOUNTER
Provider requested that infusion rate be slowed, patient to log BP and send Dr. Cheryl Wray and PCP a log of BP to monitor for continued HTN, and patient should seek ER care with any additional side effects from IVIG and continued increased BP.       Called L

## 2020-12-07 ENCOUNTER — TELEPHONE (OUTPATIENT)
Dept: FAMILY MEDICINE CLINIC | Facility: CLINIC | Age: 60
End: 2020-12-07

## 2020-12-07 ENCOUNTER — HOSPITAL ENCOUNTER (OUTPATIENT)
Facility: HOSPITAL | Age: 60
Setting detail: OBSERVATION
Discharge: HOME | End: 2020-12-09
Attending: EMERGENCY MEDICINE | Admitting: HOSPITALIST
Payer: COMMERCIAL

## 2020-12-07 DIAGNOSIS — D64.9 ANEMIA, UNSPECIFIED TYPE: ICD-10-CM

## 2020-12-07 DIAGNOSIS — E11.65 TYPE 2 DIABETES MELLITUS WITH HYPERGLYCEMIA, WITHOUT LONG-TERM CURRENT USE OF INSULIN (HCC): Primary | ICD-10-CM

## 2020-12-07 DIAGNOSIS — G70.00 MYASTHENIA GRAVIS (HCC): ICD-10-CM

## 2020-12-07 DIAGNOSIS — D72.829 LEUKOCYTOSIS, UNSPECIFIED TYPE: ICD-10-CM

## 2020-12-07 PROBLEM — R73.9 HYPERGLYCEMIA: Status: ACTIVE | Noted: 2020-12-07

## 2020-12-07 PROCEDURE — 99220 INITIAL OBSERVATION CARE,LEVL III: CPT | Performed by: HOSPITALIST

## 2020-12-07 RX ORDER — TEMAZEPAM 15 MG/1
15 CAPSULE ORAL NIGHTLY PRN
Status: DISCONTINUED | OUTPATIENT
Start: 2020-12-07 | End: 2020-12-09

## 2020-12-07 RX ORDER — POLYETHYLENE GLYCOL 3350 17 G/17G
17 POWDER, FOR SOLUTION ORAL DAILY PRN
Status: DISCONTINUED | OUTPATIENT
Start: 2020-12-07 | End: 2020-12-09

## 2020-12-07 RX ORDER — DEXTROSE MONOHYDRATE 25 G/50ML
50 INJECTION, SOLUTION INTRAVENOUS
Status: DISCONTINUED | OUTPATIENT
Start: 2020-12-07 | End: 2020-12-09

## 2020-12-07 RX ORDER — SODIUM CHLORIDE 9 MG/ML
INJECTION, SOLUTION INTRAVENOUS CONTINUOUS
Status: DISCONTINUED | OUTPATIENT
Start: 2020-12-07 | End: 2020-12-09

## 2020-12-07 RX ORDER — PREDNISONE 20 MG/1
40 TABLET ORAL DAILY
Status: DISCONTINUED | OUTPATIENT
Start: 2020-12-07 | End: 2020-12-08

## 2020-12-07 RX ORDER — PYRIDOSTIGMINE BROMIDE 60 MG/1
90 TABLET ORAL 2 TIMES DAILY
Status: DISCONTINUED | OUTPATIENT
Start: 2020-12-08 | End: 2020-12-09

## 2020-12-07 RX ORDER — ENOXAPARIN SODIUM 100 MG/ML
40 INJECTION SUBCUTANEOUS DAILY
Status: DISCONTINUED | OUTPATIENT
Start: 2020-12-07 | End: 2020-12-09

## 2020-12-07 RX ORDER — MYCOPHENOLATE MOFETIL 250 MG/1
500 CAPSULE ORAL 2 TIMES DAILY
Status: DISCONTINUED | OUTPATIENT
Start: 2020-12-07 | End: 2020-12-09

## 2020-12-07 RX ORDER — PYRIDOSTIGMINE BROMIDE 60 MG/1
60 TABLET ORAL 3 TIMES DAILY
Status: DISCONTINUED | OUTPATIENT
Start: 2020-12-08 | End: 2020-12-09

## 2020-12-07 RX ORDER — PYRIDOSTIGMINE BROMIDE 60 MG/1
60 TABLET ORAL 2 TIMES DAILY
Status: DISCONTINUED | OUTPATIENT
Start: 2020-12-08 | End: 2020-12-07

## 2020-12-07 RX ORDER — ACETAMINOPHEN 500 MG
1000 TABLET ORAL EVERY 6 HOURS PRN
Status: DISCONTINUED | OUTPATIENT
Start: 2020-12-07 | End: 2020-12-09

## 2020-12-07 RX ORDER — PYRIDOSTIGMINE BROMIDE 60 MG/1
60 TABLET ORAL
Status: DISCONTINUED | OUTPATIENT
Start: 2020-12-07 | End: 2020-12-07

## 2020-12-07 RX ORDER — ONDANSETRON 2 MG/ML
4 INJECTION INTRAMUSCULAR; INTRAVENOUS EVERY 6 HOURS PRN
Status: DISCONTINUED | OUTPATIENT
Start: 2020-12-07 | End: 2020-12-09

## 2020-12-07 RX ORDER — SODIUM PHOSPHATE, DIBASIC AND SODIUM PHOSPHATE, MONOBASIC 7; 19 G/133ML; G/133ML
1 ENEMA RECTAL ONCE AS NEEDED
Status: DISCONTINUED | OUTPATIENT
Start: 2020-12-07 | End: 2020-12-09

## 2020-12-07 RX ORDER — INSULIN ASPART 100 [IU]/ML
0.2 INJECTION, SOLUTION INTRAVENOUS; SUBCUTANEOUS ONCE
Status: COMPLETED | OUTPATIENT
Start: 2020-12-07 | End: 2020-12-07

## 2020-12-07 RX ORDER — BISACODYL 10 MG
10 SUPPOSITORY, RECTAL RECTAL
Status: DISCONTINUED | OUTPATIENT
Start: 2020-12-07 | End: 2020-12-09

## 2020-12-07 NOTE — TELEPHONE ENCOUNTER
Called patient and left message on VM to call back.  Called to discuss my recommendations:  Keep clinic visit that is in 1 week approximately  Check labs this week, he is on Cellcept- should be taking 500mg BID  Decrease prednisone to 40mg   Hold off on JESUS

## 2020-12-07 NOTE — TELEPHONE ENCOUNTER
Pt was at work and 911 was called at 11:00 am.  He thought he vomited blood this am and he had an episode of dizziness. When the paramedics arrived they did feel as though he vomited blood. Pt said it was dark in color and when asked if it looked like coffee grounds he said \" yes\" He is not a diabetic and his blood sugar was in the 400's per pt. Pt was currently feeling very weak in the legs. He denied chest pain or shortness of breath. I tried to contact the pt.s spouse but was unsuccessful. Pt also tried but could not reach her. I informed the pt I thought it was best to call 911. Pt agreed. I kept the pt on hold and called 911. Pt is aware they were in route to his home. Sidra Vargas the  asked me to hang up with the patient so they could call him and get some information. I informed pt the  will be calling and to please make sure he answers the phone. I reinforced to pt the importance of not refusing transport to the hospital as he must be evaluated. Pt agreed to plan and verbalized understanding.

## 2020-12-07 NOTE — TELEPHONE ENCOUNTER
See Alternate TE regarding FCT testing.     Provider will review FCT testing that has been sent to office

## 2020-12-07 NOTE — TELEPHONE ENCOUNTER
1. What are your symptoms? Dizziness, threw up blood         2. How long have you been having these symptoms? Since 11 am         3. Have you done anything already to treat your symptoms?  Drank a little water         ADDITIONAL INFO:  BP at work was 112/85 blood sugar was in 400's

## 2020-12-07 NOTE — TELEPHONE ENCOUNTER
Patient returned call to Dr. Hong Goode and was given the following recommendations per the notes below:    Called patient and left message on VM to call back.  Called to discuss my recommendations:  Keep clinic visit that is in 1 week approximately  Check la

## 2020-12-07 NOTE — TELEPHONE ENCOUNTER
Spoke with patient today and he states he is unsure if he wants to continue to apply for LT disability benefits. Paperwork is completed and with provider along with the FCT report.

## 2020-12-08 PROCEDURE — 99244 OFF/OP CNSLTJ NEW/EST MOD 40: CPT | Performed by: OTHER

## 2020-12-08 PROCEDURE — 99225 SUBSEQUENT OBSERVATION CARE: CPT | Performed by: INTERNAL MEDICINE

## 2020-12-08 RX ORDER — AMLODIPINE BESYLATE 5 MG/1
5 TABLET ORAL DAILY
Status: DISCONTINUED | OUTPATIENT
Start: 2020-12-08 | End: 2020-12-08

## 2020-12-08 RX ORDER — LISINOPRIL 40 MG/1
40 TABLET ORAL DAILY
Status: DISCONTINUED | OUTPATIENT
Start: 2020-12-08 | End: 2020-12-09

## 2020-12-08 NOTE — H&P
[facility]  History and Physical     Thaddeus See Patient Status:  Emergency    1960 MRN [id number]   Location [address] [facility] Attending Karthik Elizondo MD   Hosp Day # 0 PCP Judit May MD     Chief Comp duodenal ulcer     Social History:  reports that he quit smoking about 22 years ago. His smoking use included cigarettes. He has a 20.00 pack-year smoking history. He has never used smokeless tobacco. He reports current alcohol use.  He reports that he does 1 CAPSULE DAILY, Disp: , Rfl:       Physical Exam:    /89   Pulse 120   Temp 96.5 °F (35.8 °C) (Temporal)   Resp 20   Ht 185.4 cm (6' 1\")   Wt 260 lb (117.9 kg)   SpO2 95%   BMI 34.30 kg/m²   General: No acute distress. Alert and oriented x 3.   Ill-

## 2020-12-08 NOTE — PROGRESS NOTES
[facility]  Progress Note     Betty Scot Patient Status:  Observation    1960 MRN [id number]   [facility] 7NE-A Attending Dania Jimenez, DO   Hosp Day # 0 PCP Marlene Pena MD     Chief Complaint: weakness    S: P enoxaparin  40 mg Subcutaneous Daily   • Insulin Aspart Pen  1-68 Units Subcutaneous TID CC   • insulin detemir  20 Units Subcutaneous Nightly   • Insulin Aspart Pen  1-10 Units Subcutaneous TID AC and HS   • Pyridostigmine Bromide  60 mg Oral TID   • Brian Houston

## 2020-12-08 NOTE — CONSULTS
[address] [facility] with [facility]  12/8/2020    10:37 AM      Cc: history of Myasthenia gravis on Mestinon and Prednisone and who developed hyperglycemia and associated weakness and not feeling well    HPI: 20 Units Subcutaneous Nightly   • Insulin Aspart Pen  1-10 Units Subcutaneous TID AC and HS   • Pyridostigmine Bromide  60 mg Oral TID   • Pyridostigmine Bromide  90 mg Oral BID       ROS; A comprehensive 10 point review of systems was completed.   Rekha Arias reduce Prednisone further and can do this fast now that he is on Cellcept and let Medicine service control his sugars.       He will continue follow up with my partner Dr Masoud Black MD  Vascular & General Neurology  Director, [facility]

## 2020-12-08 NOTE — PLAN OF CARE
Assumed patient care at 0730.  VSS   Prednisone changed to 30 mg daily   No syncope per patient   ;s-300's, insulin sliding scale continued   IVF continued  Home lisinopril continued   PT/OT recommeding home- signed off   POC discussed, will continue

## 2020-12-08 NOTE — RESPIRATORY THERAPY NOTE
Spoke to Pt about ANIKET this AM and Pt does wear a CPAP machine at home at night. Pt is expecting to go home today and is refusing machine for tonight if Pt has to stay. Will continue to monitor.

## 2020-12-08 NOTE — ED PROVIDER NOTES
Patient Seen in: [facility] 7ne-a      History   Patient presents with:  Hyperglycemia    Stated Complaint: hyperglycemia    HPI    78-year-old male presents to the emergency department with an elevated blood sugar. Patient has had a complex year. noted in HPI.     Social History    Tobacco Use      Smoking status: Former Smoker        Packs/day: 1.00        Years: 20.00        Pack years: 20        Types: Cigarettes        Quit date: 1998        Years since quittin.6      Smokeless tobacco ED Course     Labs Reviewed   COMP METABOLIC PANEL (14) - Abnormal; Notable for the following components:       Result Value    Glucose 496 (*)     Sodium 130 (*)     Chloride 93 (*)     BUN 55 (*)     Creatinine 1.37 (*)     BUN/CREA Ratio 40 created for panel order CBC WITH DIFFERENTIAL WITH PLATELET.   Procedure                               Abnormality         Status                     ---------                               -----------         ------                     CBC W/ DIFFERENTIAL[ does have some leukocytosis which I believe is related to his steroid use. He is otherwise not had any fevers or chills and is nontoxic-appearing.   Further care and treatment will be per his admitting team  Admission disposition: 12/7/2020  8:42 PM

## 2020-12-08 NOTE — OCCUPATIONAL THERAPY NOTE
OCCUPATIONAL THERAPY QUICK EVALUATION - INPATIENT    Room Number: 7446/3688-K  Evaluation Date: 12/8/2020     Type of Evaluation: Quick Eval  Presenting Problem: hyperglycemia    Physician Order: IP Consult to Occupational Therapy  Reason for Therapy:  ADL time  Hand Dominance: Right  Drives: Yes  Patient Regularly Uses: None    Prior Level of Function: Mod Independent with ADLs and functional mobility    SUBJECTIVE  \"I'm ready to go home. \"    Patient self-stated goal is to go home    OBJECTIVE  Precaution mod I    Patient End of Session: In bed;Needs met;Call light within reach;RN aware of session/findings; All patient questions and concerns addressed    ASSESSMENT     Patient is a 56year old male admitted on 12/7/2020 for hyperglycemia.  Complete medical hi

## 2020-12-08 NOTE — PLAN OF CARE
Problem: Impaired Activities of Daily Living  Goal: Achieve highest/safest level of independence in self care  Description: Interventions:  - Assess ability and encourage patient to participate in ADLs to maximize function  - Promote sitting position [facility]

## 2020-12-08 NOTE — TELEPHONE ENCOUNTER
RN to call patient to assess current health status and noted patient is hospitalized. Routed to provider to inform.

## 2020-12-08 NOTE — PHYSICAL THERAPY NOTE
PHYSICAL THERAPY QUICK EVALUATION - INPATIENT    Room Number: 3800/4761-K  Evaluation Date: 12/8/2020  Presenting Problem: hyperglycemia, weakness  Physician Order: PT Eval and Treat    History related to current admission:   Pt is 56year old male admit Pt with recent diagnosis of MG and had been off of work. Pt recently returned to work 5 hours per day.      SUBJECTIVE  \"I feel better\"    OBJECTIVE  Precautions: Bed/chair alarm  Fall Risk: Standard fall risk    WEIGHT BEARING RESTRICTION  Weight Bearing Assessment  Gait Assistance: Independent  Distance (ft): 200  Assistive Device: None  Pattern: Within Functional Limits  Stoop/Curb Assistance: Not tested  Comment : Pt ascended and descended 4 steps with use of 1 rail, reciprocal gait pattern, and supervi services. Please re-order if a new functional limitation presents during this admission. GOALS  Patient was able to achieve the following goals . ..     Patient was able to transfer Safely and independently   Patient able to ambulate on level surfaces Sa

## 2020-12-08 NOTE — PLAN OF CARE
NURSING ADMISSION NOTE      Patient admitted via Cart  Oriented to room. Safety precautions initiated. Bed in low position. Call light in reach.     Report from [facility] in ER  Pt A/O x4  Navigator and medications completed with patient by charge nurse

## 2020-12-08 NOTE — PLAN OF CARE
Assumed pt care at 2230  A/O x4   RA  ST on tele  No c/o pain  Elevated glucose; insulin given per order  Ambulates independently   Call light within reach, will continue to monitor     Problem: Diabetes/Glucose Control  Goal: Glucose maintained within pre

## 2020-12-08 NOTE — DIETARY NOTE
[facility]     Admitting diagnosis:  Myasthenia gravis ([facility] [address] [G70.00]  Anemia, unspecified type [D64.9]  Leukocytosis, unspecified type [D72.829]  Type 2 diabetes mellitus with hyperglycemia, without long-te answered at time of visit. Patient is at low nutrition risk at this time. Please consult if patient status changes or nutrition issues arise.     Monique Nguyen RD,WAYNEN  Phone #[phone]  Pager #[phone]

## 2020-12-09 ENCOUNTER — TELEPHONE (OUTPATIENT)
Dept: ENDOCRINOLOGY CLINIC | Facility: CLINIC | Age: 60
End: 2020-12-09

## 2020-12-09 VITALS
OXYGEN SATURATION: 95 % | WEIGHT: 260 LBS | TEMPERATURE: 98 F | BODY MASS INDEX: 34.46 KG/M2 | DIASTOLIC BLOOD PRESSURE: 82 MMHG | HEART RATE: 81 BPM | SYSTOLIC BLOOD PRESSURE: 152 MMHG | HEIGHT: 73 IN | RESPIRATION RATE: 16 BRPM

## 2020-12-09 PROCEDURE — 99217 OBSERVATION CARE DISCHARGE: CPT | Performed by: INTERNAL MEDICINE

## 2020-12-09 PROCEDURE — 99213 OFFICE O/P EST LOW 20 MIN: CPT | Performed by: OTHER

## 2020-12-09 RX ORDER — LISINOPRIL 40 MG/1
40 TABLET ORAL DAILY
Qty: 30 TABLET | Refills: 0 | Status: SHIPPED | OUTPATIENT
Start: 2020-12-10 | End: 2020-12-09

## 2020-12-09 RX ORDER — INSULIN GLARGINE 100 [IU]/ML
30 INJECTION, SOLUTION SUBCUTANEOUS NIGHTLY
Qty: 5 PEN | Refills: 3 | Status: SHIPPED | OUTPATIENT
Start: 2020-12-09 | End: 2021-05-05 | Stop reason: ALTCHOICE

## 2020-12-09 RX ORDER — AMLODIPINE BESYLATE 5 MG/1
5 TABLET ORAL DAILY
Status: DISCONTINUED | OUTPATIENT
Start: 2020-12-09 | End: 2020-12-09

## 2020-12-09 RX ORDER — AMLODIPINE BESYLATE 5 MG/1
5 TABLET ORAL DAILY
Qty: 30 TABLET | Refills: 0 | Status: SHIPPED | OUTPATIENT
Start: 2020-12-10 | End: 2020-12-09

## 2020-12-09 RX ORDER — PREDNISONE 10 MG/1
5 TABLET ORAL DAILY
Refills: 0 | Status: SHIPPED | COMMUNITY
Start: 2020-12-10 | End: 2021-02-03 | Stop reason: ALTCHOICE

## 2020-12-09 RX ORDER — POTASSIUM CHLORIDE 20 MEQ/1
40 TABLET, EXTENDED RELEASE ORAL EVERY 4 HOURS
Status: COMPLETED | OUTPATIENT
Start: 2020-12-09 | End: 2020-12-09

## 2020-12-09 RX ORDER — LISINOPRIL 40 MG/1
40 TABLET ORAL DAILY
Qty: 30 TABLET | Refills: 0 | Status: SHIPPED | OUTPATIENT
Start: 2020-12-10 | End: 2021-01-09

## 2020-12-09 RX ORDER — AMLODIPINE BESYLATE 5 MG/1
5 TABLET ORAL DAILY
Qty: 30 TABLET | Refills: 0 | Status: SHIPPED | OUTPATIENT
Start: 2020-12-10 | End: 2021-01-20

## 2020-12-09 NOTE — PROGRESS NOTES
Patient seen and examined  Medically stable and okay for discharge home today  Have discussed all medication changes with patient in detail  He will need close follow up and will need to monitor his blood sugars at home  Follow up in [facility] clinic and see PCP

## 2020-12-09 NOTE — PROGRESS NOTES
[address]  12/9/2020   10:35 AM    Patient seen and examined.     Problems:  MG on prednisone, Mestinon and Cellcept  Came in with increased weakness and not feeling well but then had blood sugar >500    Developments the pas standpoint    >50% of the time allotted for today's visit was spent on reviewing labs, results, conferring with other caregivers, counseling when needed and discussing options and care plan.      Enzo Son MD  General and Vascular Neurology  [phone]

## 2020-12-09 NOTE — PROGRESS NOTES
To whom it may concern,    Please excuse Radha Cardenas form any missed work or work obligations.  Patient was hospitalized for a medical condition at [facility] in [place], [place] from 12/7 - 12/9/2020    Patient may return to work on 12/14/2020 unl

## 2020-12-09 NOTE — DISCHARGE SUMMARY
[facility] HOSPITALIST  DISCHARGE SUMMARY     Dc Pro Patient Status:  Observation    1960 MRN [id number]   [facility] 7NE-A Attending Ana Faith,    Hosp Day # 0 PCP Kasey Coelho MD     Date of Admission: 2020 He denies chest pain or shortness of breath. He denies palpitations. He denies fevers or chills. He did have an episode of emesis. He denies diarrhea or abdominal pain. He denies acute focal weakness just generalized fatigue.   He states he felt so we taking on: December 10, 2020  What changed:   · medication strength  · how much to take  · how to take this  · when to take this  · additional instructions      Take 1 tablet (40 mg total) by mouth daily.    Stop taking on: January 9, 2021  Quantity: 30 tab [address], [facility] [id number]    Hours: 24-hours Phone: [phone]   · amLODIPine Besylate 5 MG Tabs  · Lantus SoloStar 100 UNIT/ML Sopn  · lisinopril 40 MG Tabs  · metFORMIN HCl 500 MG Tabs         [place]    Follow-up appointment:

## 2020-12-09 NOTE — PLAN OF CARE
Assumed pt care at 1900  A/O x4   RA  NSR on tele  IVF infusing  No c/o pain  Ambulates independently   Possible dc home today  Call light within reach, will continue to monitor     Problem: Diabetes/Glucose Control  Goal: Glucose maintained within prescri for mobility and gait  - Educate and engage patient/family in tolerated activity level and precautions  Outcome: Progressing

## 2020-12-10 ENCOUNTER — PATIENT OUTREACH (OUTPATIENT)
Dept: CASE MANAGEMENT | Age: 60
End: 2020-12-10

## 2020-12-10 DIAGNOSIS — G70.00 MYASTHENIA GRAVIS (HCC): ICD-10-CM

## 2020-12-10 DIAGNOSIS — Z02.9 ENCOUNTERS FOR UNSPECIFIED ADMINISTRATIVE PURPOSE: ICD-10-CM

## 2020-12-10 DIAGNOSIS — I10 ESSENTIAL HYPERTENSION: ICD-10-CM

## 2020-12-10 DIAGNOSIS — E66.9 DIABETES MELLITUS TYPE 2 IN OBESE (HCC): ICD-10-CM

## 2020-12-10 DIAGNOSIS — R73.9 HYPERGLYCEMIA: ICD-10-CM

## 2020-12-10 DIAGNOSIS — D72.829 LEUKOCYTOSIS, UNSPECIFIED TYPE: ICD-10-CM

## 2020-12-10 DIAGNOSIS — E11.65 TYPE 2 DIABETES MELLITUS WITH HYPERGLYCEMIA, WITHOUT LONG-TERM CURRENT USE OF INSULIN (HCC): ICD-10-CM

## 2020-12-10 DIAGNOSIS — E11.69 DIABETES MELLITUS TYPE 2 IN OBESE (HCC): ICD-10-CM

## 2020-12-10 PROCEDURE — 1111F DSCHRG MED/CURRENT MED MERGE: CPT

## 2020-12-10 NOTE — PROGRESS NOTES
NIKOLAS LM requesting a call back to [phone] with a condition update.  NC also requested patient call [facility] clinic at [phone] to confirm appointment on 12/11/2020 at 10:30 am.

## 2020-12-10 NOTE — PROGRESS NOTES
Initial Post Discharge Follow Up   Discharge Date: 12/9/20  Contact Date: 12/10/2020    Consent Verification:  Assessment Completed With: Patient  HIPAA Verified? Yes    Discharge Dx:     1.  Generalized weakness- suspected dehydration stemming from hyp Low blood sugars, instructed patient to walk as tolerated, as exercise such as walking will help to lower his blood sugar as well.  NCM reviewed discharge instructions, medications, S&S of infection/blood clots with patient he verbalized understanding of th (five) times daily. 60, 60, 60, 30 (Patient taking differently: Take by mouth 5 (five) times daily. Pt takes 5 times daily.  60mg alternating with 90mg, starting with 60mg at 5am.  60mg, 90mg, 60mg, 90mg, 60mg ) 150 tablet 3   • omega-3 fatty acids 1000 MG [address], [address] [address]  [address] [place], [place] [address] [place] [id number] [phone]          PCP TC

## 2020-12-10 NOTE — TELEPHONE ENCOUNTER
Discussed functional capacity and hospitalization with patient. Appt with me next week. Continue prednisone 30mg, mestinon, and Cellcept. Monitor BP's with PCP and hold IVIG at this time. He was in agreement. He will be trying part day work next week.

## 2020-12-11 ENCOUNTER — OFFICE VISIT (OUTPATIENT)
Dept: INTERNAL MEDICINE CLINIC | Facility: CLINIC | Age: 60
End: 2020-12-11
Payer: COMMERCIAL

## 2020-12-11 VITALS
TEMPERATURE: 97 F | OXYGEN SATURATION: 98 % | SYSTOLIC BLOOD PRESSURE: 126 MMHG | HEART RATE: 117 BPM | WEIGHT: 250 LBS | RESPIRATION RATE: 18 BRPM | BODY MASS INDEX: 33.13 KG/M2 | DIASTOLIC BLOOD PRESSURE: 78 MMHG | HEIGHT: 73 IN

## 2020-12-11 DIAGNOSIS — G70.00 MYASTHENIA GRAVIS (HCC): ICD-10-CM

## 2020-12-11 DIAGNOSIS — E11.65 TYPE 2 DIABETES MELLITUS WITH HYPERGLYCEMIA, WITHOUT LONG-TERM CURRENT USE OF INSULIN (HCC): Primary | ICD-10-CM

## 2020-12-11 PROCEDURE — 99212 OFFICE O/P EST SF 10 MIN: CPT | Performed by: NURSE PRACTITIONER

## 2020-12-11 PROCEDURE — 3074F SYST BP LT 130 MM HG: CPT | Performed by: NURSE PRACTITIONER

## 2020-12-11 PROCEDURE — 3008F BODY MASS INDEX DOCD: CPT | Performed by: NURSE PRACTITIONER

## 2020-12-11 PROCEDURE — 1111F DSCHRG MED/CURRENT MED MERGE: CPT | Performed by: NURSE PRACTITIONER

## 2020-12-11 PROCEDURE — 3078F DIAST BP <80 MM HG: CPT | Performed by: NURSE PRACTITIONER

## 2020-12-11 RX ORDER — FAMOTIDINE 20 MG/1
20 TABLET ORAL DAILY PRN
COMMUNITY
End: 2020-12-23 | Stop reason: ALTCHOICE

## 2020-12-11 RX ORDER — GARLIC EXTRACT 500 MG
1 CAPSULE ORAL DAILY
COMMUNITY

## 2020-12-11 NOTE — PROGRESS NOTES
TRANSITIONAL CARE CLINIC PHARMACIST MEDICATION RECONCILIATION        Rosy Jules MRN [id number]    1960 PCP Sangeeta Mcclendon MD       Comments: Medication history completed by the [address] [place] [address] Pharmacist with the patient in th capsule by mouth every evening. • Secukinumab 150 MG/ML Subcutaneous Solution Auto-injector Inject 150 mg into the skin every 30 (thirty) days.      • VITAMIN D 400 UNIT OR CAPS 1 CAPSULE DAILY     Medication Adherence Assessment:   Patient reports laly

## 2020-12-11 NOTE — PATIENT INSTRUCTIONS
Pepcid start 20 mg daily while on steroids   If stomach still acting up reduce metformn to just daily and see if that helps.      Monitor weight, wt loss if able  Small amts make a difference    Future Appointments   Date Time Provider Carol Phillip   1

## 2020-12-14 ENCOUNTER — TELEPHONE (OUTPATIENT)
Dept: NEUROLOGY | Facility: CLINIC | Age: 60
End: 2020-12-14

## 2020-12-14 NOTE — TELEPHONE ENCOUNTER
to go back to work, pt needs to operate heavy machinery, he is hoping Dr can determine/write a note, or should his pcp do that? please advise

## 2020-12-14 NOTE — TELEPHONE ENCOUNTER
Yes, can return to work but with limitations. About 4-5 hours, and if fatigues with prolonged standing, or using heavy machinery, needs to immediately stop and rest, for 5- 20 minutes, depending on how he feels his strength is afterwards.  He should be able

## 2020-12-15 NOTE — PROGRESS NOTES
[address]      HISTORY   CHIEF COMPLAINT: Follow-up after hospitalization  HPI: Lindsay Lazo is a 56year old male here today for hospital follow up for generalized weakness due to dehydration from poorly Tab, Take 1 tablet twice a day, Disp: 60 tablet, Rfl: 0    •  temazepam 15 MG Oral Cap, Take 1 capsule (15 mg total) by mouth nightly as needed for Sleep., Disp: 30 capsule, Rfl: 1    •  Pyridostigmine Bromide 60 MG Oral Tab, Take 1 tablet (60 mg total) by Pack years: 20        Types: Cigarettes        Quit date: 1998        Years since quittin.6      Smokeless tobacco: Never Used    Alcohol use: Yes      Drinks per session: 1 or 2      Binge frequency: Never      Comment: 1-2 per day during the states normal range of motion of extremities  NEUROLOGIC: denies confusion, balance difficulty, headache, memory loss, migraines, vertigo, weakness, numbness/tingling   PSYCHIATRIC: denies depression or anxiety, hallucinations, confusion, paranoia, suicida over-the-counter. Also will continue taking while on prednisone. Neurology will be directing prednisone tapering. Have also suggested he might need to cut back on his Metformin if stomach is still an issue    3.   Myasthenia gravis  Follow-up with neurol , Disp: , Rfl:     •  VITAMIN D 400 UNIT OR CAPS, 1 CAPSULE DAILY, Disp: , Rfl:       Requested Prescriptions      No prescriptions requested or ordered in this encounter         Health Maintenance:  Diabetes Care Dilated Eye Exam due on 01/13/1960  Annual Disposition/Plan:   Your appointments     Date & Time Appointment Department [facility])    Dec 15, 2020  2:00 PM CST DIAB MEDICAL NUTRITION with Rena Mauro RN, CDE [id number] On license of [facility] ([facility] [address] [facility])        Dec 17, 2020

## 2020-12-16 ENCOUNTER — DIABETIC EDUCATION (OUTPATIENT)
Dept: ENDOCRINOLOGY CLINIC | Facility: CLINIC | Age: 60
End: 2020-12-16
Payer: COMMERCIAL

## 2020-12-16 DIAGNOSIS — E11.65 TYPE 2 DIABETES MELLITUS WITH HYPERGLYCEMIA, UNSPECIFIED WHETHER LONG TERM INSULIN USE (HCC): Primary | ICD-10-CM

## 2020-12-16 PROCEDURE — 97802 MEDICAL NUTRITION INDIV IN: CPT | Performed by: DIETITIAN, REGISTERED

## 2020-12-16 NOTE — PROGRESS NOTES
Sangeeta Davila   1/13/1960 was seen for Diabetic Medical Nutrition Therapy:    12/16/2020  Referring Provider: Dr. Katie Kamara  Start time: 3:00 End time: 4:00    HgbA1C (%)   Date Value   12/07/2020 10.1 (H)     Being tapered off pred for myasthenius cereal, eliminate the regular soda at lunch, start testing pp and try to walk 10 min TID when he's able to. Patient is willing to make lifestyle changes to improve blood glucose control. Written materials provided for all topics covered.   Patient betty

## 2020-12-17 ENCOUNTER — TELEPHONE (OUTPATIENT)
Dept: NEUROLOGY | Facility: CLINIC | Age: 60
End: 2020-12-17

## 2020-12-17 NOTE — TELEPHONE ENCOUNTER
Pt's wife called to state that pt was very weak, pale, and could not get off of the toilet. RN instructed pt's wife to call 911. Pt's wife stated she did not want to call 911 because they would take him to [facility] and she wanted him to come to [facility].  Again AROLDO

## 2020-12-17 NOTE — TELEPHONE ENCOUNTER
Called pt's wife to check on pt. She decided to call 911 and they took him to [facility]. Transferred pt's wife to provider.

## 2020-12-17 NOTE — TELEPHONE ENCOUNTER
Spoke to wife. Discussed importance of evaluation in ED, and with the current symptoms he is having, lightheadedness and being pale, he will likely need more testing for his blood counts. Patient was taken by ambulance to [facility].  Answered wife's questions and

## 2020-12-18 ENCOUNTER — TELEPHONE (OUTPATIENT)
Dept: NEUROLOGY | Facility: CLINIC | Age: 60
End: 2020-12-18

## 2020-12-18 NOTE — TELEPHONE ENCOUNTER
Per PSR: Dr Tracy Norris has questions about Costella Pu who is being admitted into the hospital, please call her cell-not urgent-when Dr. BALDERRAMA [facility] gets a chance  [phone]

## 2020-12-21 ENCOUNTER — TELEPHONE (OUTPATIENT)
Dept: NEUROLOGY | Facility: CLINIC | Age: 60
End: 2020-12-21

## 2020-12-21 ENCOUNTER — TELEPHONE (OUTPATIENT)
Dept: FAMILY MEDICINE CLINIC | Facility: CLINIC | Age: 60
End: 2020-12-21

## 2020-12-21 DIAGNOSIS — D64.9 ANEMIA, UNSPECIFIED TYPE: Primary | ICD-10-CM

## 2020-12-21 NOTE — TELEPHONE ENCOUNTER
Order for CBC was placed for patient. Please ask if he remembers what was the last hemoglobin level he had? Patient can have a blood work done tomorrow.    Thank you

## 2020-12-21 NOTE — TELEPHONE ENCOUNTER
Per PSR: Noah Mayfield made an appt for February 12,(due to dr's availability,)  if doctor would like to see him sooner, please advise on her schedule

## 2020-12-21 NOTE — TELEPHONE ENCOUNTER
Was discharged from the hospital and is coming in 12/28/20 he needs to re[peat his hemoglobin by next week please order thank you. Nessa Olivo Labs/EKG/Imaging Studies/Medications

## 2020-12-21 NOTE — TELEPHONE ENCOUNTER
Per discussion with provider she would like pt to come this week. There is opening in [place] 12/23/20. PSR to call.

## 2020-12-22 ENCOUNTER — LAB ENCOUNTER (OUTPATIENT)
Dept: LAB | Age: 60
End: 2020-12-22
Attending: FAMILY MEDICINE
Payer: COMMERCIAL

## 2020-12-22 ENCOUNTER — MED REC SCAN ONLY (OUTPATIENT)
Dept: FAMILY MEDICINE CLINIC | Facility: CLINIC | Age: 60
End: 2020-12-22

## 2020-12-22 DIAGNOSIS — L40.0 PSORIASIS VULGARIS: Primary | ICD-10-CM

## 2020-12-22 DIAGNOSIS — G70.00 MYASTHENIA GRAVIS (HCC): ICD-10-CM

## 2020-12-22 DIAGNOSIS — D64.9 ANEMIA, UNSPECIFIED TYPE: ICD-10-CM

## 2020-12-22 PROCEDURE — 86480 TB TEST CELL IMMUN MEASURE: CPT

## 2020-12-22 PROCEDURE — 36415 COLL VENOUS BLD VENIPUNCTURE: CPT

## 2020-12-23 ENCOUNTER — OFFICE VISIT (OUTPATIENT)
Dept: NEUROLOGY | Facility: CLINIC | Age: 60
End: 2020-12-23
Payer: COMMERCIAL

## 2020-12-23 ENCOUNTER — TELEPHONE (OUTPATIENT)
Dept: NEUROLOGY | Facility: CLINIC | Age: 60
End: 2020-12-23

## 2020-12-23 VITALS
HEART RATE: 70 BPM | SYSTOLIC BLOOD PRESSURE: 138 MMHG | BODY MASS INDEX: 33 KG/M2 | DIASTOLIC BLOOD PRESSURE: 74 MMHG | WEIGHT: 249 LBS | RESPIRATION RATE: 14 BRPM

## 2020-12-23 DIAGNOSIS — G70.00 MYASTHENIA GRAVIS (HCC): Primary | ICD-10-CM

## 2020-12-23 DIAGNOSIS — I10 ESSENTIAL HYPERTENSION: ICD-10-CM

## 2020-12-23 DIAGNOSIS — G70.00 MYASTHENIA GRAVIS (HCC): ICD-10-CM

## 2020-12-23 DIAGNOSIS — K25.4 GASTROINTESTINAL HEMORRHAGE ASSOCIATED WITH GASTRIC ULCER: ICD-10-CM

## 2020-12-23 DIAGNOSIS — E08.69 DIABETES DUE TO UNDERLYING CONDITION W OTH COMPLICATION (HCC): ICD-10-CM

## 2020-12-23 PROCEDURE — 3078F DIAST BP <80 MM HG: CPT | Performed by: OTHER

## 2020-12-23 PROCEDURE — 99215 OFFICE O/P EST HI 40 MIN: CPT | Performed by: OTHER

## 2020-12-23 PROCEDURE — 3075F SYST BP GE 130 - 139MM HG: CPT | Performed by: OTHER

## 2020-12-23 RX ORDER — PREDNISONE 1 MG/1
TABLET ORAL
Qty: 60 TABLET | Refills: 0 | Status: SHIPPED | OUTPATIENT
Start: 2020-12-23 | End: 2020-12-28 | Stop reason: DRUGHIGH

## 2020-12-23 RX ORDER — PYRIDOSTIGMINE BROMIDE 60 MG/1
60 TABLET ORAL 4 TIMES DAILY
Qty: 1 TABLET | Refills: 0 | COMMUNITY
Start: 2020-12-23 | End: 2021-07-30

## 2020-12-23 RX ORDER — SUCRALFATE ORAL 1 G/10ML
1 SUSPENSION ORAL 4 TIMES DAILY
COMMUNITY
Start: 2020-12-20

## 2020-12-23 RX ORDER — MYCOPHENOLATE MOFETIL 500 MG/1
TABLET ORAL
Qty: 60 TABLET | Refills: 0 | Status: SHIPPED | OUTPATIENT
Start: 2020-12-23 | End: 2021-02-03

## 2020-12-23 RX ORDER — PREDNISONE 10 MG/1
TABLET ORAL
Qty: 30 TABLET | Refills: 1 | Status: SHIPPED | OUTPATIENT
Start: 2020-12-23 | End: 2020-12-28

## 2020-12-23 RX ORDER — PYRIDOSTIGMINE BROMIDE 60 MG/1
60 TABLET ORAL
Qty: 150 TABLET | Refills: 3 | Status: SHIPPED | OUTPATIENT
Start: 2020-12-23 | End: 2020-12-23

## 2020-12-23 RX ORDER — PANTOPRAZOLE SODIUM 40 MG/1
40 TABLET, DELAYED RELEASE ORAL
COMMUNITY
Start: 2020-12-21 | End: 2021-04-23

## 2020-12-23 NOTE — TELEPHONE ENCOUNTER
Incoming call asking for clarification. Spoke with Dr. Mariya Vazquez, to take Mestinon every 4 hours, total of 4x per day. Spoke with Ruslan Lehman and relayed above. Verbalized understanding.

## 2020-12-23 NOTE — PATIENT INSTRUCTIONS
After your visit at the [facility] office  today,  please direct any follow up questions or medication needs to the staff in our  [place] office so that your concerns may be promptly addressed.   We are available through [employer] or at the numbers below: must be picked up in office. • Please allow the office 2-3 business days to fill the prescription. • Patient must present photo ID at time of . PLEASE NOTE: PRESCRIPTIONS MUST BE PICKED UP PRIOR TO 3:00PM MONDAY-FRIDAY    Scheduling Tests:     If submitting forms to office staff. • Form completion may require an additional fee. • A signed Release of Information (ALVINO) must be on file before forms may be submitted. When dropping off forms, please ask the  for this paper.    • Failure

## 2020-12-23 NOTE — PROGRESS NOTES
[address]   Neurology- INITIAL CLINIC VISIT  2020, 10:43 AM     Beryle Marin Patient Status:  No patient class for patient encounter    1960 MRN [id number]   [facility] worse with diplopia. Walking can trigger it, as can focusing on something visually. Taking mestinon 60/60/60/30mg. His speech is less raspy. Diplopia is improved with 40mg of prednisone. Balance is a little worse, L knee is very painful.  Going upstairs fee Potassium      3.5 - 5.1 mmol/L 3.6 3.8   Chloride      98 - 112 mmol/L 103 103   Carbon Dioxide, Total      21.0 - 32.0 mmol/L 30.0 23   ANION GAP      0 - 18 mmol/L 6    BUN      7 - 18 mg/dL 12 13   CREATININE      0.70 - 1.30 mg/dL 0.96 0.89   BUN/CR Outpatient Medications:   •  Pantoprazole Sodium 40 MG Oral Tab EC, Take 40 mg by mouth 2 (two) times daily before meals. , Disp: , Rfl:   •  sucralfate 1 GM/10ML Oral Suspension, Take 1 g by mouth 4 (four) times daily. , Disp: , Rfl:   •  predniSONE 5 MG Or by mouth daily. (Patient not taking: Reported on 12/23/2020 ), Disp: 30 tablet, Rfl: 0  •  omega-3 fatty acids 1000 MG Oral Cap, Take 1,000 mg by mouth daily. , Disp: , Rfl:   •  Turmeric 500 MG Oral Cap, Take 1 capsule by mouth every evening.  , Disp: , Rf without any difficulty.   Stood up and down from chair 10 times ok, but at end had some shortness of breath       ASSESSMENT/ACTIVE PROBLEM LIST:   Myasthenia gravis ([facility]cc)  (primary encounter diagnosis)  Essential hypertension  Gastrointestinal hemorrhage a

## 2020-12-24 PROCEDURE — 85025 COMPLETE CBC W/AUTO DIFF WBC: CPT

## 2020-12-24 PROCEDURE — 36415 COLL VENOUS BLD VENIPUNCTURE: CPT

## 2020-12-24 NOTE — TELEPHONE ENCOUNTER
Per LOV notes - patient to return in 4 weeks. LT disability to be discussed at that time. Paperwork in bin.

## 2020-12-28 ENCOUNTER — TELEPHONE (OUTPATIENT)
Dept: NEUROLOGY | Facility: CLINIC | Age: 60
End: 2020-12-28

## 2020-12-28 ENCOUNTER — OFFICE VISIT (OUTPATIENT)
Dept: FAMILY MEDICINE CLINIC | Facility: CLINIC | Age: 60
End: 2020-12-28
Payer: COMMERCIAL

## 2020-12-28 VITALS
SYSTOLIC BLOOD PRESSURE: 126 MMHG | WEIGHT: 255 LBS | RESPIRATION RATE: 18 BRPM | TEMPERATURE: 98 F | HEIGHT: 73 IN | HEART RATE: 88 BPM | DIASTOLIC BLOOD PRESSURE: 76 MMHG | OXYGEN SATURATION: 99 % | BODY MASS INDEX: 33.8 KG/M2

## 2020-12-28 DIAGNOSIS — T38.0X5A STEROID-INDUCED DIABETES MELLITUS, INITIAL ENCOUNTER (HCC): Primary | ICD-10-CM

## 2020-12-28 DIAGNOSIS — D62 ANEMIA ASSOCIATED WITH ACUTE BLOOD LOSS: ICD-10-CM

## 2020-12-28 DIAGNOSIS — G70.00 MYASTHENIA GRAVIS (HCC): ICD-10-CM

## 2020-12-28 DIAGNOSIS — E09.9 STEROID-INDUCED DIABETES MELLITUS, INITIAL ENCOUNTER (HCC): Primary | ICD-10-CM

## 2020-12-28 DIAGNOSIS — K22.11 ULCER OF ESOPHAGUS WITH BLEEDING: ICD-10-CM

## 2020-12-28 DIAGNOSIS — K22.70 BARRETT'S ESOPHAGUS WITHOUT DYSPLASIA: ICD-10-CM

## 2020-12-28 DIAGNOSIS — I10 ESSENTIAL HYPERTENSION: ICD-10-CM

## 2020-12-28 PROCEDURE — 3008F BODY MASS INDEX DOCD: CPT | Performed by: FAMILY MEDICINE

## 2020-12-28 PROCEDURE — 99215 OFFICE O/P EST HI 40 MIN: CPT | Performed by: FAMILY MEDICINE

## 2020-12-28 PROCEDURE — 3078F DIAST BP <80 MM HG: CPT | Performed by: FAMILY MEDICINE

## 2020-12-28 PROCEDURE — 3074F SYST BP LT 130 MM HG: CPT | Performed by: FAMILY MEDICINE

## 2020-12-28 NOTE — PATIENT INSTRUCTIONS
Do blood work tomorrow we can schedule the test going through my chart or call [phone]. Continue current medications. Low-carb diet less bread less pasta less rice less potatoes and sweets. Keep good hydration.   Monitor blood sugar at home blood suga

## 2020-12-29 ENCOUNTER — LAB ENCOUNTER (OUTPATIENT)
Dept: LAB | Age: 60
End: 2020-12-29
Attending: FAMILY MEDICINE
Payer: COMMERCIAL

## 2020-12-29 DIAGNOSIS — K22.11 ULCER OF ESOPHAGUS WITH BLEEDING: ICD-10-CM

## 2020-12-29 DIAGNOSIS — E09.9 STEROID-INDUCED DIABETES MELLITUS, INITIAL ENCOUNTER (HCC): ICD-10-CM

## 2020-12-29 DIAGNOSIS — D62 ANEMIA ASSOCIATED WITH ACUTE BLOOD LOSS: ICD-10-CM

## 2020-12-29 DIAGNOSIS — D62 ANEMIA ASSOCIATED WITH ACUTE BLOOD LOSS: Primary | ICD-10-CM

## 2020-12-29 DIAGNOSIS — T38.0X5A STEROID-INDUCED DIABETES MELLITUS, INITIAL ENCOUNTER (HCC): ICD-10-CM

## 2020-12-29 LAB
ALBUMIN SERPL-MCNC: 3.3 G/DL (ref 3.4–5)
ALBUMIN/GLOB SERPL: 0.9 {RATIO} (ref 1–2)
ALP LIVER SERPL-CCNC: 99 U/L
ALT SERPL-CCNC: 49 U/L
ANION GAP SERPL CALC-SCNC: 2 MMOL/L (ref 0–18)
AST SERPL-CCNC: 23 U/L (ref 15–37)
BASOPHILS # BLD AUTO: 0.09 X10(3) UL (ref 0–0.2)
BASOPHILS NFR BLD AUTO: 1 %
BILIRUB SERPL-MCNC: 0.4 MG/DL (ref 0.1–2)
BUN BLD-MCNC: 14 MG/DL (ref 7–18)
BUN/CREAT SERPL: 17.5 (ref 10–20)
CALCIUM BLD-MCNC: 10.1 MG/DL (ref 8.5–10.1)
CHLORIDE SERPL-SCNC: 103 MMOL/L (ref 98–112)
CO2 SERPL-SCNC: 32 MMOL/L (ref 21–32)
CREAT BLD-MCNC: 0.8 MG/DL
DEPRECATED RDW RBC AUTO: 56.4 FL (ref 35.1–46.3)
EOSINOPHIL # BLD AUTO: 0.13 X10(3) UL (ref 0–0.7)
EOSINOPHIL NFR BLD AUTO: 1.4 %
ERYTHROCYTE [DISTWIDTH] IN BLOOD BY AUTOMATED COUNT: 17.2 % (ref 11–15)
GLOBULIN PLAS-MCNC: 3.7 G/DL (ref 2.8–4.4)
GLUCOSE BLD-MCNC: 113 MG/DL (ref 70–99)
HCT VFR BLD AUTO: 28.8 %
HGB BLD-MCNC: 8.6 G/DL
IMM GRANULOCYTES # BLD AUTO: 0.35 X10(3) UL (ref 0–1)
IMM GRANULOCYTES NFR BLD: 3.8 %
LYMPHOCYTES # BLD AUTO: 0.8 X10(3) UL (ref 1–4)
LYMPHOCYTES NFR BLD AUTO: 8.8 %
M PROTEIN MFR SERPL ELPH: 7 G/DL (ref 6.4–8.2)
MCH RBC QN AUTO: 26.5 PG (ref 26–34)
MCHC RBC AUTO-ENTMCNC: 29.9 G/DL (ref 31–37)
MCV RBC AUTO: 88.9 FL
MONOCYTES # BLD AUTO: 0.54 X10(3) UL (ref 0.1–1)
MONOCYTES NFR BLD AUTO: 5.9 %
NEUTROPHILS # BLD AUTO: 7.23 X10 (3) UL (ref 1.5–7.7)
NEUTROPHILS # BLD AUTO: 7.23 X10(3) UL (ref 1.5–7.7)
NEUTROPHILS NFR BLD AUTO: 79.1 %
OSMOLALITY SERPL CALC.SUM OF ELEC: 285 MOSM/KG (ref 275–295)
PATIENT FASTING Y/N/NP: YES
PLATELET # BLD AUTO: 257 10(3)UL (ref 150–450)
POTASSIUM SERPL-SCNC: 3.9 MMOL/L (ref 3.5–5.1)
RBC # BLD AUTO: 3.24 X10(6)UL
SODIUM SERPL-SCNC: 137 MMOL/L (ref 136–145)
WBC # BLD AUTO: 9.1 X10(3) UL (ref 4–11)

## 2020-12-29 PROCEDURE — 36415 COLL VENOUS BLD VENIPUNCTURE: CPT

## 2020-12-29 PROCEDURE — 80053 COMPREHEN METABOLIC PANEL: CPT

## 2020-12-29 PROCEDURE — 85025 COMPLETE CBC W/AUTO DIFF WBC: CPT

## 2020-12-29 NOTE — PROGRESS NOTES
Reji Overall is a 56year old male. cc hospitalization follow-up, upper GI bleeding, diabetes, hypertension, myasthenia gravis.   HPI:   Patient is coming to the office for hospitalization follow-up from [facility] from December 17, 2022 sugars much better. Hemoglobin A1c in December was 10.1. Hypertension blood pressure was high when he was getting IVIG. Right now his blood pressure is improved.   He is taking lisinopril 40 mg daily and started amlodipine 5 mg and he was at the [employer] not taking: Reported on 12/28/2020 ) 30 capsule 1   • omega-3 fatty acids 1000 MG Oral Cap Take 1,000 mg by mouth daily.         Past Medical History:   Diagnosis Date   • Anemia    • Diabetes ([address]     from chronic steriod use   • High blood pressure    • placed or performed in visit on 12/22/20   QUANTIFERON TB   Result Value Ref Range    Quantiferon TB NIL 0.01 IU/mL    Quantiferon TB1 minus NIL 0.00 IU/mL    Quantiferon TB2 minus NIL 0.00 IU/mL    Quantiferon TB Mitogen minus NIL 0.08 IU/mL    Quantifero daily and amlodipine. Monitor blood pressure at home. Patient says that always gets higher with IVIG treatment. Myasthenia gravis new diagnosis patient is tapering down steroids per neurology recommendations. Continue IV Ig treatments/infusions.   Per OP        HTN  - BP initially low but increased on day of d/c  - pt to resume meds upon dc     Steroid induced Diabetes Mellitus, Type II: last HgbA1c 10.1   - held oral meds in the hospital and only given lantus 10 units daily; pt instructed to start at t

## 2020-12-30 ENCOUNTER — TELEPHONE (OUTPATIENT)
Dept: NEUROLOGY | Facility: CLINIC | Age: 60
End: 2020-12-30

## 2020-12-31 ENCOUNTER — MED REC SCAN ONLY (OUTPATIENT)
Dept: FAMILY MEDICINE CLINIC | Facility: CLINIC | Age: 60
End: 2020-12-31

## 2020-12-31 NOTE — TELEPHONE ENCOUNTER
Spoke to Andrea in [address] who states as long as he remains on [employer], no prior approval is necessary.

## 2021-01-04 ENCOUNTER — TELEPHONE (OUTPATIENT)
Dept: NEUROLOGY | Facility: CLINIC | Age: 61
End: 2021-01-04

## 2021-01-04 ENCOUNTER — TELEPHONE (OUTPATIENT)
Dept: FAMILY MEDICINE CLINIC | Facility: CLINIC | Age: 61
End: 2021-01-04

## 2021-01-04 NOTE — TELEPHONE ENCOUNTER
Patient had severe anemia/blood loss. Should also clear with other physicians, as relates to his condition. From a neurology standpoint, I would recommend a limited return to work for for 1 week, decreased hours, then increasing hours based on tolerance.  Stacie Bone

## 2021-01-04 NOTE — TELEPHONE ENCOUNTER
Patient needs a clearance letter for work. Patient wants to speak to a nurse before letter is faxed to employer. Needs this today.        Employer fax:  [phone] att: Pinky Burks

## 2021-01-04 NOTE — TELEPHONE ENCOUNTER
I called and spoke to pt. He would like a letter from you releasing him to go back to work. He has been off since 12/17 and would like to go back on 01/06/2021. He also called Dr. Roderick Tillman but pt.said  she will write her letter once pt. s PCP authorized release per pt. Please fax letter to Attention: Tanner Castillo [phone]. Pt aware we will not gt back with him until Tuesday with the determination of the letter.

## 2021-01-05 NOTE — TELEPHONE ENCOUNTER
S/W pt and he states below:    -Can work 5 hours/daily (will be able to keep insurance).  -10-15 minute breaks per hour if needed to drink and/or drink water.  [place] [employer] Duty-No heavy lifting over 30 lbs. 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 Ok to wait until tomorrow per pt.

## 2021-01-05 NOTE — TELEPHONE ENCOUNTER
Okay to write a letter for the patient releasing from work on January 6, 2021. Please write a note also the patient will be also released from his neurologist.  If patient will need any restrictions-please have him to let us know. I still would like him to keep up an appointment with me at the end of the month. We will need to monitor his anemia. I would like him to take iron supplement ferrous sulfate 325 mg 1 tablet daily with vitamin C 500 mg daily. He also will need a note releasing him from neurologist.  I would like him to take it easy at his work , if possible rest.  Keep good hydration. Thanks.

## 2021-01-05 NOTE — TELEPHONE ENCOUNTER
Spoke with pt and he states he would like to have his hours decrease for now until he is 100% better, light duty, limit lifting. Also works with machinery for grinding materials. Please advise.

## 2021-01-05 NOTE — TELEPHONE ENCOUNTER
Please specify the time he wants to work. Any Breaks? He may also check with HR on light duty specifics. Thanks.

## 2021-01-05 NOTE — TELEPHONE ENCOUNTER
Per epic review pt's PCP in agreement to pt returning to work with limited hours and increasing as tolerated. Letter pended for provider to sign.

## 2021-01-06 NOTE — TELEPHONE ENCOUNTER
Per PSR note below, patient needs letter.     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  Please fax to employer and a copy of letter be put in [address]

## 2021-01-06 NOTE — TELEPHONE ENCOUNTER
Okay to type the letter for the patient that he can return to work tomorrow January 7, 2021. Patient will be returning to the light duty, no heavy lifting over 30 pounds, he can work 5 hours a day. Please allow patient to take 10 to 15 minutes breaks per hour if needed. 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   Patient also will be released to work by his neurologist.  Thank you

## 2021-01-07 ENCOUNTER — TELEPHONE (OUTPATIENT)
Dept: NEUROLOGY | Facility: CLINIC | Age: 61
End: 2021-01-07

## 2021-01-07 DIAGNOSIS — G70.00 MYASTHENIA GRAVIS (HCC): Primary | ICD-10-CM

## 2021-01-07 NOTE — TELEPHONE ENCOUNTER
Please ask for update on his symptoms- eyes, lower extremities and general strength, as well as if having any GI symptoms.

## 2021-01-07 NOTE — TELEPHONE ENCOUNTER
addended letter faxed to pt's work contact as requested. Fax confirmation received. alerted on [employer] as well.

## 2021-01-07 NOTE — TELEPHONE ENCOUNTER
I recommend to hold off until his next clinic visit. Also, he should get repeat blood work at end of January, prior to his visit.

## 2021-01-11 NOTE — TELEPHONE ENCOUNTER
METFORMIN 500MG TABLETS    Medication failed protocol due to no recent Diabetic labs. Please see pended medications. Please sign if appropriate. Thank you      Last OV: 12/28/2020.     Next appt scheduled for 01/29/2021

## 2021-01-12 ENCOUNTER — TELEPHONE (OUTPATIENT)
Dept: NEUROLOGY | Facility: CLINIC | Age: 61
End: 2021-01-12

## 2021-01-14 ENCOUNTER — LAB ENCOUNTER (OUTPATIENT)
Dept: LAB | Age: 61
End: 2021-01-14
Attending: Other
Payer: COMMERCIAL

## 2021-01-14 DIAGNOSIS — M62.81 MUSCLE WEAKNESS: ICD-10-CM

## 2021-01-14 DIAGNOSIS — Z00.00 LABORATORY TESTS ORDERED AS PART OF A COMPLETE PHYSICAL EXAM (CPE): ICD-10-CM

## 2021-01-14 DIAGNOSIS — G70.00 MYASTHENIA GRAVIS (HCC): ICD-10-CM

## 2021-01-14 LAB
ALBUMIN SERPL-MCNC: 3.8 G/DL (ref 3.4–5)
ALBUMIN/GLOB SERPL: 1.1 {RATIO} (ref 1–2)
ALP LIVER SERPL-CCNC: 85 U/L
ALT SERPL-CCNC: 40 U/L
ANION GAP SERPL CALC-SCNC: 7 MMOL/L (ref 0–18)
AST SERPL-CCNC: 18 U/L (ref 15–37)
BASOPHILS # BLD: 0 X10(3) UL (ref 0–0.2)
BASOPHILS NFR BLD: 0 %
BILIRUB SERPL-MCNC: 0.6 MG/DL (ref 0.1–2)
BUN BLD-MCNC: 16 MG/DL (ref 7–18)
BUN/CREAT SERPL: 11.9 (ref 10–20)
CALCIUM BLD-MCNC: 10.6 MG/DL (ref 8.5–10.1)
CHLORIDE SERPL-SCNC: 103 MMOL/L (ref 98–112)
CO2 SERPL-SCNC: 26 MMOL/L (ref 21–32)
CREAT BLD-MCNC: 1.34 MG/DL
DEPRECATED RDW RBC AUTO: 58.7 FL (ref 35.1–46.3)
EOSINOPHIL # BLD: 0 X10(3) UL (ref 0–0.7)
EOSINOPHIL NFR BLD: 0 %
ERYTHROCYTE [DISTWIDTH] IN BLOOD BY AUTOMATED COUNT: 18.7 % (ref 11–15)
GLOBULIN PLAS-MCNC: 3.5 G/DL (ref 2.8–4.4)
GLUCOSE BLD-MCNC: 148 MG/DL (ref 70–99)
HCT VFR BLD AUTO: 33.3 %
HGB BLD-MCNC: 9.7 G/DL
LYMPHOCYTES NFR BLD: 0.39 X10(3) UL (ref 1–4)
LYMPHOCYTES NFR BLD: 3 %
M PROTEIN MFR SERPL ELPH: 7.3 G/DL (ref 6.4–8.2)
MCH RBC QN AUTO: 25 PG (ref 26–34)
MCHC RBC AUTO-ENTMCNC: 29.1 G/DL (ref 31–37)
MCV RBC AUTO: 85.8 FL
METAMYELOCYTES # BLD: 0.26 X10(3) UL
METAMYELOCYTES NFR BLD: 2 %
MONOCYTES # BLD: 0.13 X10(3) UL (ref 0.1–1)
MONOCYTES NFR BLD: 1 %
MORPHOLOGY: NORMAL
MYELOCYTES # BLD: 0.52 X10(3) UL
MYELOCYTES NFR BLD: 4 %
NEUTROPHILS # BLD AUTO: 10.38 X10 (3) UL (ref 1.5–7.7)
NEUTROPHILS NFR BLD: 87 %
NEUTS BAND NFR BLD: 3 %
NEUTS HYPERSEG # BLD: 11.61 X10(3) UL (ref 1.5–7.7)
OSMOLALITY SERPL CALC.SUM OF ELEC: 286 MOSM/KG (ref 275–295)
PATIENT FASTING Y/N/NP: YES
PLATELET # BLD AUTO: 332 10(3)UL (ref 150–450)
PLATELET MORPHOLOGY: NORMAL
POTASSIUM SERPL-SCNC: 4.3 MMOL/L (ref 3.5–5.1)
RBC # BLD AUTO: 3.88 X10(6)UL
SODIUM SERPL-SCNC: 136 MMOL/L (ref 136–145)
TOTAL CELLS COUNTED: 100
WBC # BLD AUTO: 12.9 X10(3) UL (ref 4–11)

## 2021-01-14 PROCEDURE — 85025 COMPLETE CBC W/AUTO DIFF WBC: CPT

## 2021-01-14 PROCEDURE — 85007 BL SMEAR W/DIFF WBC COUNT: CPT

## 2021-01-14 PROCEDURE — 85027 COMPLETE CBC AUTOMATED: CPT

## 2021-01-14 PROCEDURE — 80053 COMPREHEN METABOLIC PANEL: CPT

## 2021-01-14 PROCEDURE — 36415 COLL VENOUS BLD VENIPUNCTURE: CPT

## 2021-01-15 ENCOUNTER — TELEPHONE (OUTPATIENT)
Dept: NEUROLOGY | Facility: CLINIC | Age: 61
End: 2021-01-15

## 2021-01-15 DIAGNOSIS — G70.00 MYASTHENIA GRAVIS (HCC): Primary | ICD-10-CM

## 2021-01-15 NOTE — TELEPHONE ENCOUNTER
Spoke to patient. Creatinine elevated. Possible less hydration on day of lab test, but other consideration is elevation from Cellcept. Repeat BMP. Also, he will send me an update on shoulder symptoms next week.

## 2021-01-15 NOTE — TELEPHONE ENCOUNTER
Per PSR Note:  Rebecca Yasir has questions about his recent lab work and change of medication, no rush to call him back

## 2021-01-16 ENCOUNTER — LAB ENCOUNTER (OUTPATIENT)
Dept: LAB | Age: 61
End: 2021-01-16
Attending: Other
Payer: COMMERCIAL

## 2021-01-16 ENCOUNTER — LAB ENCOUNTER (OUTPATIENT)
Dept: LAB | Age: 61
End: 2021-01-16
Attending: FAMILY MEDICINE
Payer: COMMERCIAL

## 2021-01-16 DIAGNOSIS — M62.81 MUSCLE WEAKNESS: ICD-10-CM

## 2021-01-16 DIAGNOSIS — T38.0X5A STEROID-INDUCED DIABETES MELLITUS, INITIAL ENCOUNTER (HCC): Primary | ICD-10-CM

## 2021-01-16 DIAGNOSIS — E09.9 STEROID-INDUCED DIABETES MELLITUS, INITIAL ENCOUNTER (HCC): ICD-10-CM

## 2021-01-16 DIAGNOSIS — E09.9 STEROID-INDUCED DIABETES MELLITUS, INITIAL ENCOUNTER (HCC): Primary | ICD-10-CM

## 2021-01-16 DIAGNOSIS — G70.00 MYASTHENIA GRAVIS (HCC): ICD-10-CM

## 2021-01-16 DIAGNOSIS — Z00.00 LABORATORY TESTS ORDERED AS PART OF A COMPLETE PHYSICAL EXAM (CPE): ICD-10-CM

## 2021-01-16 DIAGNOSIS — T38.0X5A STEROID-INDUCED DIABETES MELLITUS, INITIAL ENCOUNTER (HCC): ICD-10-CM

## 2021-01-16 LAB
ALBUMIN SERPL-MCNC: 3.8 G/DL (ref 3.4–5)
ALBUMIN/GLOB SERPL: 1.1 {RATIO} (ref 1–2)
ALP LIVER SERPL-CCNC: 81 U/L
ALT SERPL-CCNC: 39 U/L
ANION GAP SERPL CALC-SCNC: 7 MMOL/L (ref 0–18)
AST SERPL-CCNC: 28 U/L (ref 15–37)
BILIRUB SERPL-MCNC: 0.4 MG/DL (ref 0.1–2)
BILIRUB UR QL STRIP.AUTO: NEGATIVE
BUN BLD-MCNC: 13 MG/DL (ref 7–18)
BUN/CREAT SERPL: 13.5 (ref 10–20)
CALCIUM BLD-MCNC: 10 MG/DL (ref 8.5–10.1)
CHLORIDE SERPL-SCNC: 106 MMOL/L (ref 98–112)
CHOLEST SMN-MCNC: 224 MG/DL (ref ?–200)
CLARITY UR REFRACT.AUTO: CLEAR
CO2 SERPL-SCNC: 26 MMOL/L (ref 21–32)
COLOR UR AUTO: YELLOW
COMPLEXED PSA SERPL-MCNC: 0.52 NG/ML (ref ?–4)
CREAT BLD-MCNC: 0.96 MG/DL
GLOBULIN PLAS-MCNC: 3.6 G/DL (ref 2.8–4.4)
GLUCOSE BLD-MCNC: 97 MG/DL (ref 70–99)
GLUCOSE UR STRIP.AUTO-MCNC: NEGATIVE MG/DL
HDLC SERPL-MCNC: 61 MG/DL (ref 40–59)
KETONES UR STRIP.AUTO-MCNC: NEGATIVE MG/DL
LDLC SERPL CALC-MCNC: 138 MG/DL (ref ?–100)
LEUKOCYTE ESTERASE UR QL STRIP.AUTO: NEGATIVE
M PROTEIN MFR SERPL ELPH: 7.4 G/DL (ref 6.4–8.2)
NITRITE UR QL STRIP.AUTO: NEGATIVE
NONHDLC SERPL-MCNC: 163 MG/DL (ref ?–130)
OSMOLALITY SERPL CALC.SUM OF ELEC: 288 MOSM/KG (ref 275–295)
PATIENT FASTING Y/N/NP: YES
PH UR STRIP.AUTO: 5 [PH] (ref 4.5–8)
POTASSIUM SERPL-SCNC: 3.7 MMOL/L (ref 3.5–5.1)
PROT UR STRIP.AUTO-MCNC: NEGATIVE MG/DL
RBC UR QL AUTO: NEGATIVE
SODIUM SERPL-SCNC: 139 MMOL/L (ref 136–145)
SP GR UR STRIP.AUTO: 1.02 (ref 1–1.03)
TRIGL SERPL-MCNC: 123 MG/DL (ref 30–149)
TSI SER-ACNC: 2.18 MIU/ML (ref 0.36–3.74)
UROBILINOGEN UR STRIP.AUTO-MCNC: <2 MG/DL
VIT B12 SERPL-MCNC: 517 PG/ML (ref 193–986)
VIT D+METAB SERPL-MCNC: 54.4 NG/ML (ref 30–100)
VLDLC SERPL CALC-MCNC: 25 MG/DL (ref 0–30)

## 2021-01-16 PROCEDURE — 80061 LIPID PANEL: CPT

## 2021-01-16 PROCEDURE — 36415 COLL VENOUS BLD VENIPUNCTURE: CPT

## 2021-01-16 PROCEDURE — 81001 URINALYSIS AUTO W/SCOPE: CPT

## 2021-01-16 PROCEDURE — 84443 ASSAY THYROID STIM HORMONE: CPT

## 2021-01-16 PROCEDURE — 82607 VITAMIN B-12: CPT

## 2021-01-16 PROCEDURE — 82306 VITAMIN D 25 HYDROXY: CPT

## 2021-01-16 PROCEDURE — 80053 COMPREHEN METABOLIC PANEL: CPT

## 2021-01-20 RX ORDER — AMLODIPINE BESYLATE 5 MG/1
TABLET ORAL
Qty: 30 TABLET | Refills: 0 | Status: SHIPPED | OUTPATIENT
Start: 2021-01-20 | End: 2021-02-26

## 2021-01-20 NOTE — TELEPHONE ENCOUNTER
LOV: 12/28/2020 HFU  Amlodipine was started while pt was in hospital. Please refill if appropriate. Thank you.

## 2021-01-29 ENCOUNTER — OFFICE VISIT (OUTPATIENT)
Dept: FAMILY MEDICINE CLINIC | Facility: CLINIC | Age: 61
End: 2021-01-29
Payer: COMMERCIAL

## 2021-01-29 VITALS
RESPIRATION RATE: 18 BRPM | DIASTOLIC BLOOD PRESSURE: 58 MMHG | BODY MASS INDEX: 34.92 KG/M2 | WEIGHT: 255 LBS | SYSTOLIC BLOOD PRESSURE: 106 MMHG | HEIGHT: 71.5 IN | OXYGEN SATURATION: 97 % | TEMPERATURE: 97 F | HEART RATE: 84 BPM

## 2021-01-29 DIAGNOSIS — Z00.00 PHYSICAL EXAM, ANNUAL: Primary | ICD-10-CM

## 2021-01-29 DIAGNOSIS — I10 ESSENTIAL HYPERTENSION: ICD-10-CM

## 2021-01-29 DIAGNOSIS — R94.6 ABNORMAL FINDING ON EXAMINATION OF THYROID GLAND: ICD-10-CM

## 2021-01-29 DIAGNOSIS — D50.0 IRON DEFICIENCY ANEMIA DUE TO CHRONIC BLOOD LOSS: ICD-10-CM

## 2021-01-29 PROCEDURE — 3078F DIAST BP <80 MM HG: CPT | Performed by: FAMILY MEDICINE

## 2021-01-29 PROCEDURE — 3008F BODY MASS INDEX DOCD: CPT | Performed by: FAMILY MEDICINE

## 2021-01-29 PROCEDURE — 99396 PREV VISIT EST AGE 40-64: CPT | Performed by: FAMILY MEDICINE

## 2021-01-29 PROCEDURE — 3074F SYST BP LT 130 MM HG: CPT | Performed by: FAMILY MEDICINE

## 2021-01-29 RX ORDER — LISINOPRIL 40 MG/1
40 TABLET ORAL DAILY
COMMUNITY
End: 2021-03-16

## 2021-01-29 RX ORDER — LISINOPRIL 20 MG/1
40 TABLET ORAL DAILY
COMMUNITY
Start: 2021-01-20 | End: 2021-01-29 | Stop reason: DRUGHIGH

## 2021-01-29 NOTE — PATIENT INSTRUCTIONS
Continue iron supplements. Recheck blood work for iron and anemia in mid February. Call [phone] to schedule ultrasound of the thyroid. Continue insulin at current dose. 1 week after stopping prednisone forward blood sugar readings to our office.   If

## 2021-01-29 NOTE — PROGRESS NOTES
Mercedes Crabtree is a 59year old male who presents for a complete physical exam.   HPI:   Pt is doing well with his blood pressure which is controlled. 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   Around 100 sometimes 140s 150s depending wh 01/16/2021 28   01/14/2021 18   12/29/2020 23     ALT (SGPT) (IU/L)   Date Value   10/22/2010 87 (H)     ALT (U/L)   Date Value   01/16/2021 39   01/14/2021 40   12/29/2020 49      PROSTATE SPECIFIC AG, SERUM (ng/mL)   Date Value   10/22/2010 0.4   08/27 Lab test positive for detection of COVID-19 virus     5/2020, subsequent testing negative   • Microcytic anemia 8/27/2009   • Myasthenia gravis ([facility] [address]     7/2020   • OTHER DISEASES    • Prostate nodule 8/27/2009   • Psoriasis       Past Surgical History: depression or anxiety  HEMATOLOGIC: denies hx of anemia  ENDOCRINE: denies thyroid history  ALL/ASTHMA: denies hx of allergy or asthma    EXAM:   Resp 18   Ht 5' 11.5\" (1.816 m)   Wt 255 lb (115.7 kg)   BMI 35.07 kg/m²   Body mass index is 35.07 kg/m². Urobilinogen Urine <2.0 0.2 - 2.0 mg/dL    Nitrite Urine Negative Negative    Leukocyte Esterase Urine Negative Negative   VITAMIN B12   Result Value Ref Range    Vitamin B12 517 193 - 986 pg/mL   VITAMIN D, 25-HYDROXY   Result Value Ref Range    Vitamin D will check sugars / HBA1c at that time. Imaging & Consults:  US THYROID ([id number])    Pt's weight is Body mass index is 35.07 kg/m². , recommended low carb diet and aerobic exercise 30 minutes three times weekly.  Health maintenance, will check fasting L

## 2021-02-03 ENCOUNTER — OFFICE VISIT (OUTPATIENT)
Dept: NEUROLOGY | Facility: CLINIC | Age: 61
End: 2021-02-03
Payer: COMMERCIAL

## 2021-02-03 VITALS
DIASTOLIC BLOOD PRESSURE: 68 MMHG | RESPIRATION RATE: 14 BRPM | BODY MASS INDEX: 35 KG/M2 | HEART RATE: 82 BPM | SYSTOLIC BLOOD PRESSURE: 110 MMHG | WEIGHT: 253 LBS

## 2021-02-03 DIAGNOSIS — I10 ESSENTIAL HYPERTENSION: ICD-10-CM

## 2021-02-03 DIAGNOSIS — K25.0 ACUTE GASTRIC ULCER WITH HEMORRHAGE: ICD-10-CM

## 2021-02-03 DIAGNOSIS — D50.8 OTHER IRON DEFICIENCY ANEMIA: ICD-10-CM

## 2021-02-03 DIAGNOSIS — K76.0 FATTY LIVER: ICD-10-CM

## 2021-02-03 DIAGNOSIS — M25.512 BILATERAL SHOULDER PAIN, UNSPECIFIED CHRONICITY: ICD-10-CM

## 2021-02-03 DIAGNOSIS — G70.00 MYASTHENIA GRAVIS (HCC): Primary | ICD-10-CM

## 2021-02-03 DIAGNOSIS — M25.511 BILATERAL SHOULDER PAIN, UNSPECIFIED CHRONICITY: ICD-10-CM

## 2021-02-03 DIAGNOSIS — M54.2 NECK PAIN: ICD-10-CM

## 2021-02-03 PROCEDURE — 3074F SYST BP LT 130 MM HG: CPT | Performed by: OTHER

## 2021-02-03 PROCEDURE — 99214 OFFICE O/P EST MOD 30 MIN: CPT | Performed by: OTHER

## 2021-02-03 PROCEDURE — 3078F DIAST BP <80 MM HG: CPT | Performed by: OTHER

## 2021-02-03 RX ORDER — MYCOPHENOLATE MOFETIL 500 MG/1
TABLET ORAL
Qty: 60 TABLET | Refills: 1 | Status: SHIPPED | OUTPATIENT
Start: 2021-02-03 | End: 2021-04-20

## 2021-02-03 RX ORDER — PREDNISONE 1 MG/1
5 TABLET ORAL DAILY
COMMUNITY
End: 2021-05-05 | Stop reason: ALTCHOICE

## 2021-02-03 NOTE — TELEPHONE ENCOUNTER
LT disability paperwork discarded at this time. Patient has had many RTW letters and new paperwork would be needed for LT disability.

## 2021-02-03 NOTE — PROGRESS NOTES
[address]   Neurology    Christina Golden Patient Status:  No patient class for patient encounter    1960 MRN [id number]   [address] [place] PCP Devendra James MD               can focusing on something visually. Taking mestinon 60/60/60/30mg. His speech is less raspy. Diplopia is improved with 40mg of prednisone. Balance is a little worse, L knee is very painful. Going upstairs feels fatiguing.  Walking slow helps legs not feel h 15.5 16.8   46.4 46.8 50.0   94 92 93   31 31 31   33 33 34   168 163 183   12.0 11.4 12.6   14 14 14   0.00 0.00      7/30/20 CMP  Glucose 141        10/2017    attributed to fatty liver        Component      Latest Ref Rng & U paternal grandfather. Father has psoriasis    Social History:   reports that he quit smoking about 22 years ago. His smoking use included cigarettes. He has a 20.00 pack-year smoking history.  He has never used smokeless tobacco. He reports current alcohol CAPS, 1 CAPSULE DAILY, Disp: , Rfl:   •  temazepam 15 MG Oral Cap, Take 1 capsule (15 mg total) by mouth nightly as needed for Sleep.  (Patient not taking: Reported on 2/3/2021 ), Disp: 30 capsule, Rfl: 1      Review of Systems:   A comprehensive 10 point r walk on heels, toes and tandem without any difficulty.   Stood up and down from chair 10 times ok, but at end had some shortness of breath       ASSESSMENT/ACTIVE PROBLEM LIST:   Myasthenia gravis (hcc)  (primary encounter diagnosis)  Fatty liver  Essential

## 2021-02-25 ENCOUNTER — LAB ENCOUNTER (OUTPATIENT)
Dept: LAB | Age: 61
End: 2021-02-25
Attending: Other
Payer: COMMERCIAL

## 2021-02-25 DIAGNOSIS — G70.00 MYASTHENIA GRAVIS (HCC): ICD-10-CM

## 2021-02-25 DIAGNOSIS — D50.0 IRON DEFICIENCY ANEMIA DUE TO CHRONIC BLOOD LOSS: ICD-10-CM

## 2021-02-25 LAB
ALBUMIN SERPL-MCNC: 3.6 G/DL (ref 3.4–5)
ALBUMIN/GLOB SERPL: 1 {RATIO} (ref 1–2)
ALP LIVER SERPL-CCNC: 73 U/L
ALT SERPL-CCNC: 61 U/L
ANION GAP SERPL CALC-SCNC: 5 MMOL/L (ref 0–18)
AST SERPL-CCNC: 54 U/L (ref 15–37)
BASOPHILS # BLD AUTO: 0.14 X10(3) UL (ref 0–0.2)
BASOPHILS NFR BLD AUTO: 2.2 %
BILIRUB SERPL-MCNC: 0.4 MG/DL (ref 0.1–2)
BUN BLD-MCNC: 8 MG/DL (ref 7–18)
BUN/CREAT SERPL: 9 (ref 10–20)
CALCIUM BLD-MCNC: 9.5 MG/DL (ref 8.5–10.1)
CHLORIDE SERPL-SCNC: 107 MMOL/L (ref 98–112)
CO2 SERPL-SCNC: 28 MMOL/L (ref 21–32)
CREAT BLD-MCNC: 0.89 MG/DL
DEPRECATED HBV CORE AB SER IA-ACNC: 12.1 NG/ML
DEPRECATED RDW RBC AUTO: 51.5 FL (ref 35.1–46.3)
EOSINOPHIL # BLD AUTO: 0.3 X10(3) UL (ref 0–0.7)
EOSINOPHIL NFR BLD AUTO: 4.8 %
ERYTHROCYTE [DISTWIDTH] IN BLOOD BY AUTOMATED COUNT: 17.5 % (ref 11–15)
GLOBULIN PLAS-MCNC: 3.7 G/DL (ref 2.8–4.4)
GLUCOSE BLD-MCNC: 84 MG/DL (ref 70–99)
HCT VFR BLD AUTO: 34.5 %
HGB BLD-MCNC: 9.9 G/DL
IMM GRANULOCYTES # BLD AUTO: 0.12 X10(3) UL (ref 0–1)
IMM GRANULOCYTES NFR BLD: 1.9 %
IRON SATURATION: 10 %
IRON SERPL-MCNC: 50 UG/DL
LYMPHOCYTES # BLD AUTO: 1.16 X10(3) UL (ref 1–4)
LYMPHOCYTES NFR BLD AUTO: 18.5 %
M PROTEIN MFR SERPL ELPH: 7.3 G/DL (ref 6.4–8.2)
MCH RBC QN AUTO: 23.2 PG (ref 26–34)
MCHC RBC AUTO-ENTMCNC: 28.7 G/DL (ref 31–37)
MCV RBC AUTO: 81 FL
MONOCYTES # BLD AUTO: 0.93 X10(3) UL (ref 0.1–1)
MONOCYTES NFR BLD AUTO: 14.8 %
NEUTROPHILS # BLD AUTO: 3.62 X10 (3) UL (ref 1.5–7.7)
NEUTROPHILS # BLD AUTO: 3.62 X10(3) UL (ref 1.5–7.7)
NEUTROPHILS NFR BLD AUTO: 57.8 %
OSMOLALITY SERPL CALC.SUM OF ELEC: 288 MOSM/KG (ref 275–295)
PATIENT FASTING Y/N/NP: YES
PLATELET # BLD AUTO: 277 10(3)UL (ref 150–450)
POTASSIUM SERPL-SCNC: 3.5 MMOL/L (ref 3.5–5.1)
RBC # BLD AUTO: 4.26 X10(6)UL
SODIUM SERPL-SCNC: 140 MMOL/L (ref 136–145)
TOTAL IRON BINDING CAPACITY: 490 UG/DL (ref 240–450)
TRANSFERRIN SERPL-MCNC: 329 MG/DL (ref 200–360)
WBC # BLD AUTO: 6.3 X10(3) UL (ref 4–11)

## 2021-02-25 PROCEDURE — 83540 ASSAY OF IRON: CPT

## 2021-02-25 PROCEDURE — 80053 COMPREHEN METABOLIC PANEL: CPT

## 2021-02-25 PROCEDURE — 83550 IRON BINDING TEST: CPT

## 2021-02-25 PROCEDURE — 85025 COMPLETE CBC W/AUTO DIFF WBC: CPT

## 2021-02-25 PROCEDURE — 82728 ASSAY OF FERRITIN: CPT

## 2021-02-25 PROCEDURE — 36415 COLL VENOUS BLD VENIPUNCTURE: CPT

## 2021-02-26 RX ORDER — AMLODIPINE BESYLATE 5 MG/1
TABLET ORAL
Qty: 30 TABLET | Refills: 0 | Status: SHIPPED | OUTPATIENT
Start: 2021-02-26 | End: 2021-07-07

## 2021-03-16 RX ORDER — LISINOPRIL 40 MG/1
TABLET ORAL
Qty: 30 TABLET | Refills: 0 | Status: SHIPPED | OUTPATIENT
Start: 2021-03-16 | End: 2021-04-20

## 2021-03-16 NOTE — TELEPHONE ENCOUNTER
Pt has appt 3/19/2021. Lisinopril dose increased to 40mg daily in hospital 12/17/2020 and you have not filled this dose before. Please advise if OK to refill. Thank you.

## 2021-03-19 ENCOUNTER — LAB ENCOUNTER (OUTPATIENT)
Dept: LAB | Age: 61
End: 2021-03-19
Attending: FAMILY MEDICINE
Payer: COMMERCIAL

## 2021-03-19 ENCOUNTER — OFFICE VISIT (OUTPATIENT)
Dept: FAMILY MEDICINE CLINIC | Facility: CLINIC | Age: 61
End: 2021-03-19
Payer: COMMERCIAL

## 2021-03-19 VITALS
OXYGEN SATURATION: 97 % | HEIGHT: 71 IN | DIASTOLIC BLOOD PRESSURE: 54 MMHG | HEART RATE: 86 BPM | WEIGHT: 252 LBS | SYSTOLIC BLOOD PRESSURE: 102 MMHG | TEMPERATURE: 97 F | RESPIRATION RATE: 18 BRPM | BODY MASS INDEX: 35.28 KG/M2

## 2021-03-19 DIAGNOSIS — D50.0 IRON DEFICIENCY ANEMIA DUE TO CHRONIC BLOOD LOSS: ICD-10-CM

## 2021-03-19 DIAGNOSIS — G70.00 MYASTHENIA GRAVIS (HCC): ICD-10-CM

## 2021-03-19 DIAGNOSIS — T38.0X5D STEROID-INDUCED DIABETES MELLITUS, SUBSEQUENT ENCOUNTER (HCC): ICD-10-CM

## 2021-03-19 DIAGNOSIS — E09.9 STEROID-INDUCED DIABETES MELLITUS, SUBSEQUENT ENCOUNTER (HCC): ICD-10-CM

## 2021-03-19 DIAGNOSIS — M79.641 RIGHT HAND PAIN: Primary | ICD-10-CM

## 2021-03-19 DIAGNOSIS — R73.9 HYPERGLYCEMIA: ICD-10-CM

## 2021-03-19 DIAGNOSIS — M25.571 ACUTE RIGHT ANKLE PAIN: ICD-10-CM

## 2021-03-19 DIAGNOSIS — I10 ESSENTIAL HYPERTENSION: ICD-10-CM

## 2021-03-19 LAB
BASOPHILS # BLD AUTO: 0.12 X10(3) UL (ref 0–0.2)
BASOPHILS NFR BLD AUTO: 1.5 %
DEPRECATED RDW RBC AUTO: 50.4 FL (ref 35.1–46.3)
EOSINOPHIL # BLD AUTO: 0.34 X10(3) UL (ref 0–0.7)
EOSINOPHIL NFR BLD AUTO: 4.4 %
ERYTHROCYTE [DISTWIDTH] IN BLOOD BY AUTOMATED COUNT: 17.6 % (ref 11–15)
EST. AVERAGE GLUCOSE BLD GHB EST-MCNC: 120 MG/DL (ref 68–126)
HBA1C MFR BLD HPLC: 5.8 % (ref ?–5.7)
HCT VFR BLD AUTO: 37.1 %
HGB BLD-MCNC: 10.8 G/DL
IMM GRANULOCYTES # BLD AUTO: 0.1 X10(3) UL (ref 0–1)
IMM GRANULOCYTES NFR BLD: 1.3 %
LYMPHOCYTES # BLD AUTO: 1.26 X10(3) UL (ref 1–4)
LYMPHOCYTES NFR BLD AUTO: 16.1 %
MCH RBC QN AUTO: 23 PG (ref 26–34)
MCHC RBC AUTO-ENTMCNC: 29.1 G/DL (ref 31–37)
MCV RBC AUTO: 78.9 FL
MONOCYTES # BLD AUTO: 1.1 X10(3) UL (ref 0.1–1)
MONOCYTES NFR BLD AUTO: 14.1 %
NEUTROPHILS # BLD AUTO: 4.89 X10 (3) UL (ref 1.5–7.7)
NEUTROPHILS # BLD AUTO: 4.89 X10(3) UL (ref 1.5–7.7)
NEUTROPHILS NFR BLD AUTO: 62.6 %
PLATELET # BLD AUTO: 277 10(3)UL (ref 150–450)
RBC # BLD AUTO: 4.7 X10(6)UL
WBC # BLD AUTO: 7.8 X10(3) UL (ref 4–11)

## 2021-03-19 PROCEDURE — 83036 HEMOGLOBIN GLYCOSYLATED A1C: CPT | Performed by: FAMILY MEDICINE

## 2021-03-19 PROCEDURE — 3008F BODY MASS INDEX DOCD: CPT | Performed by: FAMILY MEDICINE

## 2021-03-19 PROCEDURE — 3078F DIAST BP <80 MM HG: CPT | Performed by: FAMILY MEDICINE

## 2021-03-19 PROCEDURE — 85025 COMPLETE CBC W/AUTO DIFF WBC: CPT | Performed by: FAMILY MEDICINE

## 2021-03-19 PROCEDURE — 36415 COLL VENOUS BLD VENIPUNCTURE: CPT | Performed by: FAMILY MEDICINE

## 2021-03-19 PROCEDURE — 99214 OFFICE O/P EST MOD 30 MIN: CPT | Performed by: FAMILY MEDICINE

## 2021-03-19 PROCEDURE — 3074F SYST BP LT 130 MM HG: CPT | Performed by: FAMILY MEDICINE

## 2021-03-19 RX ORDER — SECUKINUMAB 150 MG/ML
INJECTION SUBCUTANEOUS
COMMUNITY
Start: 2021-03-05 | End: 2021-03-19 | Stop reason: DRUGHIGH

## 2021-03-19 NOTE — PATIENT INSTRUCTIONS
Continue current medications. We will call you with test results and let you know when those are back. Start taking your iron supplementation twice a day. Follow-up with GI as scheduled. Call [phone] to schedule x-ray of the right ankle.   Tonya Mcadams

## 2021-03-20 NOTE — PROGRESS NOTES
Grant Rosenberg is a 59year old male. cc iron deficiency anemia, diabetes, hypertension, right hand pain, right ankle swelling and pain, myasthenia gravis  HPI:   Patient is coming to the office for follow-up visit.   He has iron deficiency anemia due t TABLET BY MOUTH DAILY 30 tablet 0   • METFORMIN  MG Oral Tab TAKE 1 TABLET(500 MG) BY MOUTH TWICE DAILY WITH MEALS 60 tablet 5   • predniSONE 5 MG Oral Tab Take 5 mg by mouth daily. • Ascorbic Acid (VITAMIN C OR) Take 1,000 mg by mouth daily. Years since quittin.9      Smokeless tobacco: Never Used    Vaping Use      Vaping Use: Never used    Alcohol use: Yes      Comment: 1-2 per day during the week, 3-4 on weekends    Drug use: No       REVIEW OF SYSTEMS:   GENERAL HEALTH: feels well oth - 295 mOsm/kg    GFR, Non- 91 >=60    GFR, -American 105 >=60    AST 54 (H) 15 - 37 U/L    ALT 61 16 - 61 U/L    Alkaline Phosphatase 73 45 - 117 U/L    Bilirubin, Total 0.4 0.1 - 2.0 mg/dL    Total Protein 7.3 6.4 - 8.2 g/dL    Albu DIFFERENTIAL W PLATELET      LIPID PANEL      Iron And Tibc [E]      Ferritin [E]      Meds & Refills for this Visit:  Requested Prescriptions      No prescriptions requested or ordered in this encounter     Continue current medications.     We will call yo

## 2021-03-29 ENCOUNTER — MED REC SCAN ONLY (OUTPATIENT)
Dept: FAMILY MEDICINE CLINIC | Facility: CLINIC | Age: 61
End: 2021-03-29

## 2021-04-01 ENCOUNTER — TELEPHONE (OUTPATIENT)
Dept: NEUROLOGY | Facility: CLINIC | Age: 61
End: 2021-04-01

## 2021-04-02 ENCOUNTER — LAB ENCOUNTER (OUTPATIENT)
Dept: LAB | Age: 61
End: 2021-04-02
Attending: Other
Payer: COMMERCIAL

## 2021-04-02 ENCOUNTER — HOSPITAL ENCOUNTER (OUTPATIENT)
Dept: GENERAL RADIOLOGY | Age: 61
Discharge: HOME | End: 2021-04-02
Attending: FAMILY MEDICINE
Payer: COMMERCIAL

## 2021-04-02 ENCOUNTER — TELEPHONE (OUTPATIENT)
Dept: NEUROLOGY | Facility: CLINIC | Age: 61
End: 2021-04-02

## 2021-04-02 DIAGNOSIS — G70.00 MYASTHENIA GRAVIS (HCC): ICD-10-CM

## 2021-04-02 DIAGNOSIS — M25.571 ACUTE RIGHT ANKLE PAIN: ICD-10-CM

## 2021-04-02 DIAGNOSIS — G70.00 MYASTHENIA GRAVIS (HCC): Primary | ICD-10-CM

## 2021-04-02 PROCEDURE — 80048 BASIC METABOLIC PNL TOTAL CA: CPT | Performed by: OTHER

## 2021-04-02 PROCEDURE — 36415 COLL VENOUS BLD VENIPUNCTURE: CPT | Performed by: OTHER

## 2021-04-02 PROCEDURE — 73610 X-RAY EXAM OF ANKLE: CPT | Performed by: FAMILY MEDICINE

## 2021-04-02 NOTE — TELEPHONE ENCOUNTER
Pt. Is feeling great (in the am especially) not so good late in the afternoon, but thinks thats not going away, had the covid vaccine  Also, does he need blood work for his upcoming appt on 6/7, and is it okay to wait that long for this appt?

## 2021-04-05 DIAGNOSIS — R93.89 ABNORMAL FINDING OF DIAGNOSTIC IMAGING: ICD-10-CM

## 2021-04-05 DIAGNOSIS — Q79.9 BONY ABNORMALITY: Primary | ICD-10-CM

## 2021-04-05 NOTE — TELEPHONE ENCOUNTER
We can add him on 5/5 in a NP slot. However, please note done only once. I strongly recommend that patient schedules follow up appointments on day of appointment or shortly after, so that he can ensure timely follow up.  Blood work should be obtained in 2-3

## 2021-04-06 ENCOUNTER — TELEPHONE (OUTPATIENT)
Dept: FAMILY MEDICINE CLINIC | Facility: CLINIC | Age: 61
End: 2021-04-06

## 2021-04-06 NOTE — TELEPHONE ENCOUNTER
LMTCB to move up [facility] 5[facility]5 [facility] 10:[facility] Dr. Malik Jaramillo- slot on hold

## 2021-04-06 NOTE — TELEPHONE ENCOUNTER
Yes he should do the labs ordered by Dr. Suzanne Blake. They include the same labs that Dr. Micheal Fry ordered.

## 2021-04-06 NOTE — TELEPHONE ENCOUNTER
Call from pt-sts \"Dr calvert ordered labs for me to do. Want dr Ahmet Parisi to look at those orders and let me know if she still wants me to do all her orders and when. \"  sts has not followed up w GI yet-\"will do soon.  Have a follow up w dr Ahmet Parisi

## 2021-04-10 ENCOUNTER — LAB ENCOUNTER (OUTPATIENT)
Dept: LAB | Age: 61
End: 2021-04-10
Attending: Other
Payer: COMMERCIAL

## 2021-04-10 DIAGNOSIS — G70.00 MYASTHENIA GRAVIS (HCC): ICD-10-CM

## 2021-04-10 PROCEDURE — 80053 COMPREHEN METABOLIC PANEL: CPT | Performed by: OTHER

## 2021-04-10 PROCEDURE — 85025 COMPLETE CBC W/AUTO DIFF WBC: CPT | Performed by: OTHER

## 2021-04-10 PROCEDURE — 36415 COLL VENOUS BLD VENIPUNCTURE: CPT | Performed by: OTHER

## 2021-04-12 ENCOUNTER — TELEPHONE (OUTPATIENT)
Dept: FAMILY MEDICINE CLINIC | Facility: CLINIC | Age: 61
End: 2021-04-12

## 2021-04-12 NOTE — TELEPHONE ENCOUNTER
Pt states he forgot to mention to  when he was here that his knee has also been bothering him on and off for some time but he had a fall and it just isn't getting better. Pt has to get MRI on his ankle and would like to know if he can get an order for his knee as well. Please advise pt.

## 2021-04-13 NOTE — TELEPHONE ENCOUNTER
Patient would have to be evaluated for his knee problem he may need plain x-rays before ordering any additional imaging tests. I could see him in the office or we can have him to see orthopedic Dr. Tena Goldsmith for evaluation. Please give patient option.   Thank you

## 2021-04-14 ENCOUNTER — OFFICE VISIT (OUTPATIENT)
Dept: FAMILY MEDICINE CLINIC | Facility: CLINIC | Age: 61
End: 2021-04-14
Payer: COMMERCIAL

## 2021-04-14 VITALS
HEART RATE: 82 BPM | OXYGEN SATURATION: 98 % | WEIGHT: 251 LBS | SYSTOLIC BLOOD PRESSURE: 130 MMHG | RESPIRATION RATE: 16 BRPM | TEMPERATURE: 99 F | BODY MASS INDEX: 35.14 KG/M2 | DIASTOLIC BLOOD PRESSURE: 68 MMHG | HEIGHT: 71 IN

## 2021-04-14 DIAGNOSIS — D50.0 IRON DEFICIENCY ANEMIA DUE TO CHRONIC BLOOD LOSS: ICD-10-CM

## 2021-04-14 DIAGNOSIS — G70.00 MYASTHENIA GRAVIS (HCC): ICD-10-CM

## 2021-04-14 DIAGNOSIS — M25.562 LEFT KNEE PAIN, UNSPECIFIED CHRONICITY: Primary | ICD-10-CM

## 2021-04-14 DIAGNOSIS — M25.469 SWELLING OF KNEE: ICD-10-CM

## 2021-04-14 PROCEDURE — 3008F BODY MASS INDEX DOCD: CPT | Performed by: FAMILY MEDICINE

## 2021-04-14 PROCEDURE — 3075F SYST BP GE 130 - 139MM HG: CPT | Performed by: FAMILY MEDICINE

## 2021-04-14 PROCEDURE — 99214 OFFICE O/P EST MOD 30 MIN: CPT | Performed by: FAMILY MEDICINE

## 2021-04-14 PROCEDURE — 3078F DIAST BP <80 MM HG: CPT | Performed by: FAMILY MEDICINE

## 2021-04-14 NOTE — PATIENT INSTRUCTIONS
Call [phone] to schedule x-ray of your left knee. Use topical Voltaren as needed. Okay to use Tylenol as needed for pain. Monitor symptoms.

## 2021-04-14 NOTE — PROGRESS NOTES
Romario Gonzalez is a 59year old male. cc left knee pain, myasthenia gravis. HPI:   Patient is coming to the office for evaluation of the left knee pain. It started few months back. Patient says that last year he sustained a fall of his left knee.   D Take 1 capsule by mouth daily. • insulin glargine (LANTUS SOLOSTAR) 100 UNIT/ML Subcutaneous Solution Pen-injector Inject 30 Units into the skin nightly.  (Patient taking differently: Inject 10 Units into the skin nightly.  ) 5 pen 3   • temazepam 15 MG Pulse 82   Temp 98.7 °F (37.1 °C) (Oral)   Resp 16   Ht 5' 11\" (1.803 m)   Wt 251 lb (113.9 kg)   SpO2 98%   BMI 35.01 kg/m²   GENERAL: well developed, well nourished,in no apparent distress  SKIN: no rashes,no suspicious lesions  HEENT: atraumatic, norm Neutrophil Absolute Prelim 3.75 1.50 - 7.70 x10 (3) uL    Neutrophil Absolute 3.75 1.50 - 7.70 x10(3) uL    Lymphocyte Absolute 1.16 1.00 - 4.00 x10(3) uL    Monocyte Absolute 0.94 0.10 - 1.00 x10(3) uL    Eosinophil Absolute 0.35 0.00 - 0.70 x10(3) uL                      Impression   CONCLUSION:  Question mild medial compartment space narrowing.  No acute fracture or dislocation is seen.  If clinical symptoms persist recommend followup radiographs or MRI.               Dictated by (CST): Cali Gar MD

## 2021-04-15 ENCOUNTER — HOSPITAL ENCOUNTER (OUTPATIENT)
Dept: GENERAL RADIOLOGY | Age: 61
Discharge: HOME | End: 2021-04-15
Attending: FAMILY MEDICINE
Payer: COMMERCIAL

## 2021-04-15 DIAGNOSIS — M25.562 LEFT KNEE PAIN, UNSPECIFIED CHRONICITY: ICD-10-CM

## 2021-04-15 PROCEDURE — 73564 X-RAY EXAM KNEE 4 OR MORE: CPT | Performed by: FAMILY MEDICINE

## 2021-04-19 DIAGNOSIS — G70.00 MYASTHENIA GRAVIS (HCC): Primary | ICD-10-CM

## 2021-04-20 RX ORDER — LISINOPRIL 40 MG/1
TABLET ORAL
Qty: 90 TABLET | Refills: 0 | Status: SHIPPED | OUTPATIENT
Start: 2021-04-20 | End: 2021-07-07

## 2021-04-20 RX ORDER — MYCOPHENOLATE MOFETIL 500 MG/1
TABLET ORAL
Qty: 60 TABLET | Refills: 2 | Status: SHIPPED | OUTPATIENT
Start: 2021-04-20 | End: 2021-07-13

## 2021-04-23 RX ORDER — PANTOPRAZOLE SODIUM 40 MG/1
TABLET, DELAYED RELEASE ORAL
Qty: 180 TABLET | Refills: 0 | Status: SHIPPED | OUTPATIENT
Start: 2021-04-23 | End: 2021-11-22

## 2021-04-26 ENCOUNTER — TELEPHONE (OUTPATIENT)
Dept: FAMILY MEDICINE CLINIC | Facility: CLINIC | Age: 61
End: 2021-04-26

## 2021-04-29 ENCOUNTER — TELEPHONE (OUTPATIENT)
Dept: FAMILY MEDICINE CLINIC | Facility: CLINIC | Age: 61
End: 2021-04-29

## 2021-04-29 DIAGNOSIS — M25.562 LEFT KNEE PAIN, UNSPECIFIED CHRONICITY: Primary | ICD-10-CM

## 2021-04-29 NOTE — TELEPHONE ENCOUNTER
Pt called back and informed of Dr. Maggie Norman recommendation and he voiced understanding.       Dr. Kahn Pulling  [address] [facility] [address]  [place], [address]  [phone]

## 2021-04-29 NOTE — TELEPHONE ENCOUNTER
We will have patient to see orthopedic Dr. Marcela Taylor for evaluation of his knee and they can order MRI after visit.   Thank you

## 2021-04-29 NOTE — TELEPHONE ENCOUNTER
MRI KNEE, LEFT (CFF=[id number])    Radha Mclaughlin  P [facility] [address] Office  Good morning,       This case was not approved by [employer].       Per Nathaly ALEXNADRA clinical reviewer with [employer]-  Non approval as the xray confirms the problem.  This is why case was denied.

## 2021-05-03 ENCOUNTER — TELEPHONE (OUTPATIENT)
Dept: FAMILY MEDICINE CLINIC | Facility: CLINIC | Age: 61
End: 2021-05-03

## 2021-05-03 NOTE — TELEPHONE ENCOUNTER
PA request rec'd for pantoprazole dated 4.23  I called [facility] to see if pt picked up. Per pharmacy, does not need PA, was 0$ through insurance. They are not sure why we rec'd PA request. We will disregard.

## 2021-05-04 NOTE — TELEPHONE ENCOUNTER
I called patient and made him aware of what the reason for denial of MRI of Left Knee:  MRI KNEE, LEFT (VML=[id number])     Ryann Tucker [facility] [address] Office  Good morning,     This case was not approved by [employer].       Per [place] ALEXANDRA clinical reviewer with A

## 2021-05-05 ENCOUNTER — HOSPITAL ENCOUNTER (OUTPATIENT)
Dept: MRI IMAGING | Age: 61
Discharge: HOME | End: 2021-05-05
Attending: FAMILY MEDICINE
Payer: COMMERCIAL

## 2021-05-05 ENCOUNTER — OFFICE VISIT (OUTPATIENT)
Dept: NEUROLOGY | Facility: CLINIC | Age: 61
End: 2021-05-05
Payer: COMMERCIAL

## 2021-05-05 ENCOUNTER — HOSPITAL ENCOUNTER (OUTPATIENT)
Dept: GENERAL RADIOLOGY | Age: 61
Discharge: HOME | End: 2021-05-05
Attending: FAMILY MEDICINE
Payer: COMMERCIAL

## 2021-05-05 ENCOUNTER — APPOINTMENT (OUTPATIENT)
Dept: MRI IMAGING | Age: 61
End: 2021-05-05
Attending: FAMILY MEDICINE
Payer: COMMERCIAL

## 2021-05-05 ENCOUNTER — LAB ENCOUNTER (OUTPATIENT)
Dept: LAB | Age: 61
End: 2021-05-05
Attending: Other
Payer: COMMERCIAL

## 2021-05-05 VITALS — DIASTOLIC BLOOD PRESSURE: 74 MMHG | RESPIRATION RATE: 14 BRPM | SYSTOLIC BLOOD PRESSURE: 122 MMHG | HEART RATE: 76 BPM

## 2021-05-05 DIAGNOSIS — Q79.9 BONY ABNORMALITY: ICD-10-CM

## 2021-05-05 DIAGNOSIS — G70.00 MYASTHENIA GRAVIS (HCC): Primary | ICD-10-CM

## 2021-05-05 DIAGNOSIS — R41.3 SHORT-TERM MEMORY LOSS: ICD-10-CM

## 2021-05-05 DIAGNOSIS — E08.69 DIABETES DUE TO UNDERLYING CONDITION W OTH COMPLICATION (HCC): ICD-10-CM

## 2021-05-05 DIAGNOSIS — M79.5 FOREIGN BODY (FB) IN SOFT TISSUE: ICD-10-CM

## 2021-05-05 DIAGNOSIS — R93.89 ABNORMAL FINDING OF DIAGNOSTIC IMAGING: ICD-10-CM

## 2021-05-05 DIAGNOSIS — D50.0 IRON DEFICIENCY ANEMIA DUE TO CHRONIC BLOOD LOSS: ICD-10-CM

## 2021-05-05 DIAGNOSIS — D64.9 ANEMIA, UNSPECIFIED TYPE: ICD-10-CM

## 2021-05-05 DIAGNOSIS — D64.9 ANEMIA, UNSPECIFIED TYPE: Primary | ICD-10-CM

## 2021-05-05 DIAGNOSIS — D50.8 OTHER IRON DEFICIENCY ANEMIA: ICD-10-CM

## 2021-05-05 DIAGNOSIS — I10 ESSENTIAL HYPERTENSION: ICD-10-CM

## 2021-05-05 PROCEDURE — 73721 MRI JNT OF LWR EXTRE W/O DYE: CPT | Performed by: FAMILY MEDICINE

## 2021-05-05 PROCEDURE — 3078F DIAST BP <80 MM HG: CPT | Performed by: OTHER

## 2021-05-05 PROCEDURE — 82728 ASSAY OF FERRITIN: CPT | Performed by: FAMILY MEDICINE

## 2021-05-05 PROCEDURE — 83540 ASSAY OF IRON: CPT | Performed by: FAMILY MEDICINE

## 2021-05-05 PROCEDURE — 3074F SYST BP LT 130 MM HG: CPT | Performed by: OTHER

## 2021-05-05 PROCEDURE — 82607 VITAMIN B-12: CPT | Performed by: OTHER

## 2021-05-05 PROCEDURE — 85025 COMPLETE CBC W/AUTO DIFF WBC: CPT | Performed by: FAMILY MEDICINE

## 2021-05-05 PROCEDURE — 83550 IRON BINDING TEST: CPT | Performed by: FAMILY MEDICINE

## 2021-05-05 PROCEDURE — 99214 OFFICE O/P EST MOD 30 MIN: CPT | Performed by: OTHER

## 2021-05-05 PROCEDURE — 84443 ASSAY THYROID STIM HORMONE: CPT | Performed by: OTHER

## 2021-05-05 PROCEDURE — 70030 X-RAY EYE FOR FOREIGN BODY: CPT | Performed by: FAMILY MEDICINE

## 2021-05-05 NOTE — PROGRESS NOTES
[address]   Neurology    [facility] Patient Status:  No patient class for patient encounter    1960 MRN [id number]   Location [facility] [place] PCP Rufus Hatchet, MD               can focusing on something visually. Taking mestinon 60/60/60/30mg. His speech is less raspy. Diplopia is improved with 40mg of prednisone. Balance is a little worse, L knee is very painful. Going upstairs feels fatiguing.  Walking slow helps legs not feel h AchR ab 7/29/20- 45  MusK antibody negative  CT chest at [facility] 7/31/20 - unable to load CD- no mediastinal mass identified- visualized report, enlarged hilar lymph nodes, had covid during this  MRI brain 7/30/20- no acute intracranial abnormality.    10/22/2010   Glucose      70 - 99 mg/dL 107 (H) 95   Sodium      136 - 145 mmol/L 139 140   Potassium      3.5 - 5.1 mmol/L 3.6 3.8   Chloride      98 - 112 mmol/L 103 103   Carbon Dioxide, Total      21.0 - 32.0 mmol/L 30.0 23   ANION GAP      0 - 18 mmol Allergies    MEDICATIONS:    Current Outpatient Medications:   •  PANTOPRAZOLE SODIUM 40 MG Oral Tab EC, TAKE 1 TABLET BY MOUTH TWICE DAILY BEFORE MEALS, Disp: 180 tablet, Rfl: 0  •  LISINOPRIL 40 MG Oral Tab, TAKE 1 TABLET(40 MG) BY MOUTH DAILY, Disp: 90 skin lesions. Musculoskeletal: There is no scoliosis, or joint deformities  Neurologic examination:  Mental status: Patient is alert, attentive, and oriented x 3. Language is coherent and fluent without aphasia.  Memory, comprehension and ability to follow endoscopy, prior to decision on increase in Cellcept  Continue mestinon 60mg QID  Monitor CBC/CMP monthly  Proximal leg weakness- significantly improved  Short term memory loss- check B12 and TSH, may be a sleep issue      Requested Prescriptions      No p

## 2021-05-05 NOTE — PATIENT INSTRUCTIONS
After your visit at the [facility] office  today,  please direct any follow up questions or medication needs to the staff in our  [facility] office so that your concerns may be promptly addressed.   We are available through [employer] or at the numbers below: must be picked up in office. • Please allow the office 2-3 business days to fill the prescription. • Patient must present photo ID at time of . PLEASE NOTE: PRESCRIPTIONS MUST BE PICKED UP PRIOR TO 3:00PM MONDAY-FRIDAY    Scheduling Tests:     If submitting forms to office staff. • Form completion may require an additional fee. • A signed Release of Information (ALVINO) must be on file before forms may be submitted. When dropping off forms, please ask the  for this paper.    • Failure

## 2021-05-07 ENCOUNTER — TELEPHONE (OUTPATIENT)
Dept: FAMILY MEDICINE CLINIC | Facility: CLINIC | Age: 61
End: 2021-05-07

## 2021-05-07 DIAGNOSIS — M25.571 ACUTE RIGHT ANKLE PAIN: Primary | ICD-10-CM

## 2021-05-07 DIAGNOSIS — R93.7 ABNORMAL FINDINGS ON DIAGNOSTIC IMAGING OF MUSCULOSKELETAL SYSTEM: ICD-10-CM

## 2021-05-11 ENCOUNTER — TELEPHONE (OUTPATIENT)
Dept: NEUROLOGY | Facility: CLINIC | Age: 61
End: 2021-05-11

## 2021-05-20 NOTE — TELEPHONE ENCOUNTER
P;an of care from 11/12/2020-1/10/2021 signed by Dr Sangeeta Dawn and faxed to [facility] with fax confirmation received. Sent to scanning.

## 2021-05-27 ENCOUNTER — MED REC SCAN ONLY (OUTPATIENT)
Dept: FAMILY MEDICINE CLINIC | Facility: CLINIC | Age: 61
End: 2021-05-27

## 2021-06-01 ENCOUNTER — OFFICE VISIT (OUTPATIENT)
Dept: ORTHOPEDICS CLINIC | Facility: CLINIC | Age: 61
End: 2021-06-01
Payer: COMMERCIAL

## 2021-06-01 ENCOUNTER — HOSPITAL ENCOUNTER (OUTPATIENT)
Dept: GENERAL RADIOLOGY | Age: 61
Discharge: HOME | End: 2021-06-01
Attending: ORTHOPAEDIC SURGERY
Payer: COMMERCIAL

## 2021-06-01 VITALS — HEART RATE: 84 BPM | OXYGEN SATURATION: 99 %

## 2021-06-01 DIAGNOSIS — M18.10 ARTHRITIS OF CARPOMETACARPAL (CMC) JOINT OF THUMB: Primary | ICD-10-CM

## 2021-06-01 DIAGNOSIS — M18.10 ARTHRITIS OF CARPOMETACARPAL (CMC) JOINT OF THUMB: ICD-10-CM

## 2021-06-01 PROCEDURE — 73140 X-RAY EXAM OF FINGER(S): CPT | Performed by: ORTHOPAEDIC SURGERY

## 2021-06-01 PROCEDURE — 99203 OFFICE O/P NEW LOW 30 MIN: CPT | Performed by: ORTHOPAEDIC SURGERY

## 2021-06-01 PROCEDURE — 20600 DRAIN/INJ JOINT/BURSA W/O US: CPT | Performed by: ORTHOPAEDIC SURGERY

## 2021-06-01 RX ORDER — TRIAMCINOLONE ACETONIDE 40 MG/ML
40 INJECTION, SUSPENSION INTRA-ARTICULAR; INTRAMUSCULAR ONCE
Status: COMPLETED | OUTPATIENT
Start: 2021-06-01 | End: 2021-06-01

## 2021-06-01 RX ADMIN — TRIAMCINOLONE ACETONIDE 40 MG: 40 INJECTION, SUSPENSION INTRA-ARTICULAR; INTRAMUSCULAR at 15:09:00

## 2021-06-01 NOTE — PROCEDURES
[facility] Joint Injection:    Written consent was obtained. Skin was prepped with ChloraPrep.  2 mL mixture of 1 mL of 40 mg of Kenalog and 1 mL of 1% lidocaine was injected into the right CMC joint. Patient tolerated the procedure.   No complications were enco

## 2021-06-01 NOTE — H&P
[facility] Note    CC: Right base of thumb pain    HPI: This 59year old left-hand-dominant male presents with pain at the base of the thumb. He has had pain there for a number of years. Pain is moderate to severe.   It is worse with use of his hand mouth daily. • temazepam 15 MG Oral Cap Take 1 capsule (15 mg total) by mouth nightly as needed for Sleep. 30 capsule 1   • omega-3 fatty acids 1000 MG Oral Cap Take 1,000 mg by mouth daily.      • Turmeric 500 MG Oral Cap Take 1 capsule by mouth every Negative for color change, rash and wound. Allergic/Immunologic: Negative for immunocompromised state. Neurological: Negative for dizziness, syncope and speech difficulty. Hematological: Does not bruise/bleed easily.    Psychiatric/Behavioral: [email or web address]. org  [email or web address] org  t: [id number]  f: [phone]

## 2021-06-03 ENCOUNTER — MED REC SCAN ONLY (OUTPATIENT)
Dept: FAMILY MEDICINE CLINIC | Facility: CLINIC | Age: 61
End: 2021-06-03

## 2021-06-04 ENCOUNTER — TELEPHONE (OUTPATIENT)
Dept: PHYSICAL THERAPY | Facility: HOSPITAL | Age: 61
End: 2021-06-04

## 2021-06-07 ENCOUNTER — OFFICE VISIT (OUTPATIENT)
Dept: PHYSICAL THERAPY | Age: 61
End: 2021-06-07
Attending: ORTHOPAEDIC SURGERY
Payer: COMMERCIAL

## 2021-06-07 DIAGNOSIS — M18.10 ARTHRITIS OF CARPOMETACARPAL (CMC) JOINT OF THUMB: ICD-10-CM

## 2021-06-07 DIAGNOSIS — M18.10 ARTHRITIS OF CARPOMETACARPAL (CMC) JOINT OF THUMB: Primary | ICD-10-CM

## 2021-06-07 PROCEDURE — 97760 ORTHOTIC MGMT&TRAING 1ST ENC: CPT | Performed by: OCCUPATIONAL THERAPIST

## 2021-06-07 NOTE — PROGRESS NOTES
SPLINT EVALUATION:   Referring Physician: Dr. Nargis Justin  Diagnosis: Arthritis of carpometacarpal Summers) joint of thumb (M18.10)    Date of Service: 6/7/2021   Date of Onset: several years ago      PATIENT SUMMARY   Diamond Fan is a 59year old y/o male splint precautions and instructions for splint care. -met  Frequency / Duration: Patient to follow up with MD as ordered.     Education or treatment limitation: None  Rehab Potential:Good    Patient was advised of these findings, precautions, and treatment

## 2021-06-23 ENCOUNTER — OFFICE VISIT (OUTPATIENT)
Dept: ORTHOPEDICS CLINIC | Facility: CLINIC | Age: 61
End: 2021-06-23
Payer: COMMERCIAL

## 2021-06-23 ENCOUNTER — HOSPITAL ENCOUNTER (OUTPATIENT)
Dept: GENERAL RADIOLOGY | Age: 61
Discharge: HOME | End: 2021-06-23
Attending: ORTHOPAEDIC SURGERY
Payer: COMMERCIAL

## 2021-06-23 DIAGNOSIS — M25.561 BILATERAL CHRONIC KNEE PAIN: ICD-10-CM

## 2021-06-23 DIAGNOSIS — M25.562 BILATERAL CHRONIC KNEE PAIN: ICD-10-CM

## 2021-06-23 DIAGNOSIS — M19.071 DJD (DEGENERATIVE JOINT DISEASE), ANKLE AND FOOT, RIGHT: Primary | ICD-10-CM

## 2021-06-23 DIAGNOSIS — M25.562 LEFT KNEE PAIN, UNSPECIFIED CHRONICITY: ICD-10-CM

## 2021-06-23 DIAGNOSIS — M65.9 TENOSYNOVITIS OF RIGHT ANKLE: ICD-10-CM

## 2021-06-23 DIAGNOSIS — G89.29 BILATERAL CHRONIC KNEE PAIN: ICD-10-CM

## 2021-06-23 PROCEDURE — 99243 OFF/OP CNSLTJ NEW/EST LOW 30: CPT | Performed by: ORTHOPAEDIC SURGERY

## 2021-06-23 PROCEDURE — 73564 X-RAY EXAM KNEE 4 OR MORE: CPT | Performed by: ORTHOPAEDIC SURGERY

## 2021-06-23 NOTE — PROGRESS NOTES
[facility] New Patient Note    CC: Patient presents with: Ankle Pain: Patient is here today for right ankle swelling and pain that is on and off.        HPI: The patient is a 59year old male who presents today at the request of Dr. Otoniel Almendarez TAKE 1 TABLET BY MOUTH DAILY 30 tablet 0   • METFORMIN  MG Oral Tab TAKE 1 TABLET(500 MG) BY MOUTH TWICE DAILY WITH MEALS 60 tablet 5   • Ascorbic Acid (VITAMIN C OR) Take 1,000 mg by mouth daily. • IRON OR every other day.        • sucralfate 1 no distress. Ankle range of motion is symmetrical to the left with about 10 to 15 degrees of active dorsiflexion and 35 degrees of plantarflexion. There is no discoloration or deformity about the right but it may be slightly more swollen than the left. answered and he verbalized understanding and agreement with this conservative treatment plan. Topical Voltaren may be considered as needed. Follow-up as needed. He is also scheduled to see us for assessment of his knees.   He relates that the right kne

## 2021-06-30 ENCOUNTER — TELEPHONE (OUTPATIENT)
Dept: ORTHOPEDICS CLINIC | Facility: CLINIC | Age: 61
End: 2021-06-30

## 2021-06-30 NOTE — TELEPHONE ENCOUNTER
Spoke to patient who states that he wanted an order for two braces but he was with OT and they advised him that insurances usually only cover for 1 brace, if patient wants another one that would have to be paid out of pocket.  Per patient he was quoted for

## 2021-07-01 ENCOUNTER — MED REC SCAN ONLY (OUTPATIENT)
Dept: ORTHOPEDICS CLINIC | Facility: CLINIC | Age: 61
End: 2021-07-01

## 2021-07-04 ENCOUNTER — LAB ENCOUNTER (OUTPATIENT)
Dept: LAB | Facility: HOSPITAL | Age: 61
End: 2021-07-04
Attending: FAMILY MEDICINE
Payer: COMMERCIAL

## 2021-07-04 DIAGNOSIS — E09.9 STEROID-INDUCED DIABETES MELLITUS, SUBSEQUENT ENCOUNTER (HCC): ICD-10-CM

## 2021-07-04 DIAGNOSIS — T38.0X5D STEROID-INDUCED DIABETES MELLITUS, SUBSEQUENT ENCOUNTER (HCC): ICD-10-CM

## 2021-07-04 DIAGNOSIS — D62 ANEMIA ASSOCIATED WITH ACUTE BLOOD LOSS: ICD-10-CM

## 2021-07-04 DIAGNOSIS — D50.0 IRON DEFICIENCY ANEMIA DUE TO CHRONIC BLOOD LOSS: ICD-10-CM

## 2021-07-04 DIAGNOSIS — R73.9 HYPERGLYCEMIA: ICD-10-CM

## 2021-07-04 LAB
ALBUMIN SERPL-MCNC: 3.6 G/DL (ref 3.4–5)
ALBUMIN/GLOB SERPL: 0.9 {RATIO} (ref 1–2)
ALP LIVER SERPL-CCNC: 74 U/L
ALT SERPL-CCNC: 41 U/L
ANION GAP SERPL CALC-SCNC: 5 MMOL/L (ref 0–18)
AST SERPL-CCNC: 32 U/L (ref 15–37)
BASOPHILS # BLD AUTO: 0.08 X10(3) UL (ref 0–0.2)
BASOPHILS NFR BLD AUTO: 1.2 %
BILIRUB SERPL-MCNC: 0.4 MG/DL (ref 0.1–2)
BUN BLD-MCNC: 14 MG/DL (ref 7–18)
BUN/CREAT SERPL: 18.4 (ref 10–20)
CALCIUM BLD-MCNC: 9.5 MG/DL (ref 8.5–10.1)
CHLORIDE SERPL-SCNC: 107 MMOL/L (ref 98–112)
CHOLEST SMN-MCNC: 217 MG/DL (ref ?–200)
CO2 SERPL-SCNC: 29 MMOL/L (ref 21–32)
CREAT BLD-MCNC: 0.76 MG/DL
CREAT UR-SCNC: 153 MG/DL
DEPRECATED HBV CORE AB SER IA-ACNC: 10.6 NG/ML
DEPRECATED RDW RBC AUTO: 50.6 FL (ref 35.1–46.3)
EOSINOPHIL # BLD AUTO: 0.36 X10(3) UL (ref 0–0.7)
EOSINOPHIL NFR BLD AUTO: 5.4 %
ERYTHROCYTE [DISTWIDTH] IN BLOOD BY AUTOMATED COUNT: 18.7 % (ref 11–15)
EST. AVERAGE GLUCOSE BLD GHB EST-MCNC: 123 MG/DL (ref 68–126)
GLOBULIN PLAS-MCNC: 4 G/DL (ref 2.8–4.4)
GLUCOSE BLD-MCNC: 96 MG/DL (ref 70–99)
HBA1C MFR BLD HPLC: 5.9 % (ref ?–5.7)
HCT VFR BLD AUTO: 39.7 %
HDLC SERPL-MCNC: 51 MG/DL (ref 40–59)
HGB BLD-MCNC: 11.5 G/DL
IMM GRANULOCYTES # BLD AUTO: 0.12 X10(3) UL (ref 0–1)
IMM GRANULOCYTES NFR BLD: 1.8 %
IRON SATURATION: 7 %
IRON SERPL-MCNC: 35 UG/DL
LDLC SERPL CALC-MCNC: 132 MG/DL (ref ?–100)
LYMPHOCYTES # BLD AUTO: 1.09 X10(3) UL (ref 1–4)
LYMPHOCYTES NFR BLD AUTO: 16.5 %
M PROTEIN MFR SERPL ELPH: 7.6 G/DL (ref 6.4–8.2)
MCH RBC QN AUTO: 22.3 PG (ref 26–34)
MCHC RBC AUTO-ENTMCNC: 29 G/DL (ref 31–37)
MCV RBC AUTO: 76.9 FL
MICROALBUMIN UR-MCNC: 2.73 MG/DL
MICROALBUMIN/CREAT 24H UR-RTO: 17.8 UG/MG (ref ?–30)
MONOCYTES # BLD AUTO: 0.73 X10(3) UL (ref 0.1–1)
MONOCYTES NFR BLD AUTO: 11 %
NEUTROPHILS # BLD AUTO: 4.24 X10 (3) UL (ref 1.5–7.7)
NEUTROPHILS # BLD AUTO: 4.24 X10(3) UL (ref 1.5–7.7)
NEUTROPHILS NFR BLD AUTO: 64.1 %
NONHDLC SERPL-MCNC: 166 MG/DL (ref ?–130)
OSMOLALITY SERPL CALC.SUM OF ELEC: 292 MOSM/KG (ref 275–295)
PATIENT FASTING Y/N/NP: YES
PATIENT FASTING Y/N/NP: YES
PLATELET # BLD AUTO: 193 10(3)UL (ref 150–450)
POTASSIUM SERPL-SCNC: 3.9 MMOL/L (ref 3.5–5.1)
RBC # BLD AUTO: 5.16 X10(6)UL
SODIUM SERPL-SCNC: 141 MMOL/L (ref 136–145)
TOTAL IRON BINDING CAPACITY: 501 UG/DL (ref 240–450)
TRANSFERRIN SERPL-MCNC: 336 MG/DL (ref 200–360)
TRIGL SERPL-MCNC: 189 MG/DL (ref 30–149)
VLDLC SERPL CALC-MCNC: 35 MG/DL (ref 0–30)
WBC # BLD AUTO: 6.6 X10(3) UL (ref 4–11)

## 2021-07-04 PROCEDURE — 83540 ASSAY OF IRON: CPT

## 2021-07-04 PROCEDURE — 82570 ASSAY OF URINE CREATININE: CPT

## 2021-07-04 PROCEDURE — 80061 LIPID PANEL: CPT

## 2021-07-04 PROCEDURE — 3061F NEG MICROALBUMINURIA REV: CPT | Performed by: FAMILY MEDICINE

## 2021-07-04 PROCEDURE — 80053 COMPREHEN METABOLIC PANEL: CPT

## 2021-07-04 PROCEDURE — 85025 COMPLETE CBC W/AUTO DIFF WBC: CPT

## 2021-07-04 PROCEDURE — 82043 UR ALBUMIN QUANTITATIVE: CPT

## 2021-07-04 PROCEDURE — 82728 ASSAY OF FERRITIN: CPT

## 2021-07-04 PROCEDURE — 83036 HEMOGLOBIN GLYCOSYLATED A1C: CPT

## 2021-07-04 PROCEDURE — 36415 COLL VENOUS BLD VENIPUNCTURE: CPT

## 2021-07-04 PROCEDURE — 84466 ASSAY OF TRANSFERRIN: CPT

## 2021-07-04 PROCEDURE — 3044F HG A1C LEVEL LT 7.0%: CPT | Performed by: FAMILY MEDICINE

## 2021-07-07 ENCOUNTER — OFFICE VISIT (OUTPATIENT)
Dept: FAMILY MEDICINE CLINIC | Facility: CLINIC | Age: 61
End: 2021-07-07
Payer: COMMERCIAL

## 2021-07-07 VITALS
TEMPERATURE: 98 F | BODY MASS INDEX: 34.72 KG/M2 | WEIGHT: 248 LBS | HEIGHT: 71 IN | SYSTOLIC BLOOD PRESSURE: 128 MMHG | DIASTOLIC BLOOD PRESSURE: 70 MMHG | HEART RATE: 84 BPM | RESPIRATION RATE: 16 BRPM

## 2021-07-07 DIAGNOSIS — E78.00 HYPERCHOLESTEREMIA: ICD-10-CM

## 2021-07-07 DIAGNOSIS — R73.9 HYPERGLYCEMIA: ICD-10-CM

## 2021-07-07 DIAGNOSIS — T38.0X5D STEROID-INDUCED DIABETES MELLITUS, SUBSEQUENT ENCOUNTER (HCC): ICD-10-CM

## 2021-07-07 DIAGNOSIS — I10 ESSENTIAL HYPERTENSION: Primary | ICD-10-CM

## 2021-07-07 DIAGNOSIS — D50.0 IRON DEFICIENCY ANEMIA DUE TO CHRONIC BLOOD LOSS: ICD-10-CM

## 2021-07-07 DIAGNOSIS — E09.9 STEROID-INDUCED DIABETES MELLITUS, SUBSEQUENT ENCOUNTER (HCC): ICD-10-CM

## 2021-07-07 PROCEDURE — 99214 OFFICE O/P EST MOD 30 MIN: CPT | Performed by: FAMILY MEDICINE

## 2021-07-07 PROCEDURE — 3008F BODY MASS INDEX DOCD: CPT | Performed by: FAMILY MEDICINE

## 2021-07-07 PROCEDURE — 3078F DIAST BP <80 MM HG: CPT | Performed by: FAMILY MEDICINE

## 2021-07-07 PROCEDURE — 3074F SYST BP LT 130 MM HG: CPT | Performed by: FAMILY MEDICINE

## 2021-07-07 RX ORDER — LISINOPRIL 20 MG/1
20 TABLET ORAL DAILY
Qty: 90 TABLET | Refills: 0 | Status: SHIPPED | OUTPATIENT
Start: 2021-07-07 | End: 2021-09-30

## 2021-07-07 NOTE — PATIENT INSTRUCTIONS
Call [phone] to schedule US thyroid. Try to take iron supplement every day with vitamin C 500 mg. Decrease lisinopril to 20 mg daily. Follow blood pressure readings in 2 weeks. Low-carb low-fat diet.   Do heart scan at [place] [id number]

## 2021-07-09 NOTE — PROGRESS NOTES
Treasure Kaye is a 59year old male. cc iron deficiency anemia, diabetes, hypertension, myasthenia gravis  HPI:   Patient is coming to the office for follow-up visit. He has iron deficiency anemia due to bleeding from the GI tract.   He is taking iron when awake  1 tablet 0   • Acidophilus/Pectin Oral Cap Take 1 capsule by mouth daily. • temazepam 15 MG Oral Cap Take 1 capsule (15 mg total) by mouth nightly as needed for Sleep.  30 capsule 1   • omega-3 fatty acids 1000 MG Oral Cap Take 1,000 mg by m distress  SKIN: no rashes,no suspicious lesions  HEENT: atraumatic, normocephalic,  NECK: supple,no adenopathy   LUNGS: clear to auscultation  CARDIO: RRR without murmur  GI: good BS's,no masses, HSM or tenderness  EXTREMITIES: +1 edema at the right ankle Capacity 501 (H) 240 - 450 ug/dL    % Saturation 7 (L) 20 - 50 %   FERRITIN   Result Value Ref Range    Ferritin 10.6 (L) 30.0 - 530.0 ng/mL   CBC W/ DIFFERENTIAL   Result Value Ref Range    WBC 6.6 4.0 - 11.0 x10(3) uL    RBC 5.16 4.30 - 5.70 x10(6)uL Consults:  None    The patient indicates understanding of these issues and agrees to the plan. The patient is asked to return in 3 months. The note was dictated using speech recognition software.   Accuracy and grammar in transcription may be subject t

## 2021-07-13 ENCOUNTER — OFFICE VISIT (OUTPATIENT)
Dept: ORTHOPEDICS CLINIC | Facility: CLINIC | Age: 61
End: 2021-07-13
Payer: COMMERCIAL

## 2021-07-13 VITALS — HEART RATE: 68 BPM | OXYGEN SATURATION: 99 %

## 2021-07-13 DIAGNOSIS — G70.00 MYASTHENIA GRAVIS (HCC): ICD-10-CM

## 2021-07-13 DIAGNOSIS — M18.10 ARTHRITIS OF CARPOMETACARPAL (CMC) JOINT OF THUMB: Primary | ICD-10-CM

## 2021-07-13 PROCEDURE — 20600 DRAIN/INJ JOINT/BURSA W/O US: CPT | Performed by: ORTHOPAEDIC SURGERY

## 2021-07-13 RX ORDER — TRIAMCINOLONE ACETONIDE 40 MG/ML
40 INJECTION, SUSPENSION INTRA-ARTICULAR; INTRAMUSCULAR ONCE
Status: COMPLETED | OUTPATIENT
Start: 2021-07-13 | End: 2021-07-13

## 2021-07-13 RX ADMIN — TRIAMCINOLONE ACETONIDE 40 MG: 40 INJECTION, SUSPENSION INTRA-ARTICULAR; INTRAMUSCULAR at 16:33:00

## 2021-07-13 NOTE — TELEPHONE ENCOUNTER
Medication: MYCOPHENOLATE MOFETIL 500 MG Oral Tab    Date of last refill: 04/20/2021 (#60/2)  [place]    Last office visit: 05/05/2021  Due back to clinic per last office note:  Around 07/14/2021  Date next office vi

## 2021-07-14 DIAGNOSIS — G70.00 MYASTHENIA GRAVIS (HCC): ICD-10-CM

## 2021-07-14 NOTE — TELEPHONE ENCOUNTER
Refused.  Duplicate request.     Medication: MYCOPHENOLATE MOFETIL 500 MG Oral Tab    Date of last refill: 04/20/2021 (#60/2)  [place]    Last office visit: 05/05/2021  Due back to clinic per last office note:  Around

## 2021-07-15 RX ORDER — MYCOPHENOLATE MOFETIL 500 MG/1
TABLET ORAL
Qty: 60 TABLET | Refills: 0 | Status: SHIPPED | OUTPATIENT
Start: 2021-07-15 | End: 2021-08-12

## 2021-07-15 NOTE — TELEPHONE ENCOUNTER
Noted, original request still needs to be reviewed by provider.  Once signed, can refuse this duplicate request.

## 2021-07-19 RX ORDER — LISINOPRIL 40 MG/1
TABLET ORAL
Qty: 90 TABLET | Refills: 0 | OUTPATIENT
Start: 2021-07-19

## 2021-07-19 RX ORDER — MYCOPHENOLATE MOFETIL 500 MG/1
TABLET ORAL
Qty: 60 TABLET | Refills: 2 | OUTPATIENT
Start: 2021-07-19

## 2021-07-21 ENCOUNTER — OFFICE VISIT (OUTPATIENT)
Dept: NEUROLOGY | Facility: CLINIC | Age: 61
End: 2021-07-21
Payer: COMMERCIAL

## 2021-07-21 VITALS — SYSTOLIC BLOOD PRESSURE: 136 MMHG | HEART RATE: 66 BPM | RESPIRATION RATE: 14 BRPM | DIASTOLIC BLOOD PRESSURE: 70 MMHG

## 2021-07-21 DIAGNOSIS — G70.00 MYASTHENIA GRAVIS (HCC): ICD-10-CM

## 2021-07-21 DIAGNOSIS — D50.8 OTHER IRON DEFICIENCY ANEMIA: ICD-10-CM

## 2021-07-21 DIAGNOSIS — I10 ESSENTIAL HYPERTENSION: ICD-10-CM

## 2021-07-21 DIAGNOSIS — R29.898 WEAKNESS OF BOTH LOWER EXTREMITIES: Primary | ICD-10-CM

## 2021-07-21 DIAGNOSIS — K25.0 ACUTE GASTRIC ULCER WITH HEMORRHAGE: ICD-10-CM

## 2021-07-21 PROCEDURE — 99214 OFFICE O/P EST MOD 30 MIN: CPT | Performed by: OTHER

## 2021-07-21 PROCEDURE — 3078F DIAST BP <80 MM HG: CPT | Performed by: OTHER

## 2021-07-21 PROCEDURE — 3075F SYST BP GE 130 - 139MM HG: CPT | Performed by: OTHER

## 2021-07-21 RX ORDER — MYCOPHENOLATE MOFETIL 500 MG/1
750 TABLET ORAL 2 TIMES DAILY
Qty: 90 TABLET | Refills: 1 | Status: SHIPPED | OUTPATIENT
Start: 2021-07-21 | End: 2021-09-04

## 2021-07-21 NOTE — PATIENT INSTRUCTIONS
After your visit at the [facility] office  today,  please direct any follow up questions or medication needs to the staff in our  [place] office so that your concerns may be promptly addressed.   We are available through "[employer] or at the numbers below: must be picked up in office. • Please allow the office 2-3 business days to fill the prescription. • Patient must present photo ID at time of . PLEASE NOTE: PRESCRIPTIONS MUST BE PICKED UP PRIOR TO 3:00PM MONDAY-FRIDAY    Scheduling Tests:     If submitting forms to office staff. • Form completion may require an additional fee. • A signed Release of Information (ALVINO) must be on file before forms may be submitted. When dropping off forms, please ask the  for this paper.    • Failure

## 2021-07-21 NOTE — PROGRESS NOTES
[address]   Neurology    Emeli Luis Patient Status:  No patient class for patient encounter    1960 MRN [id number]   Location [facility] PCP Shanda Miramontes MD               can focusing on something visually. Taking mestinon 60/60/60/30mg. His speech is less raspy. Diplopia is improved with 40mg of prednisone. Balance is a little worse, L knee is very painful. Going upstairs feels fatiguing.  Walking slow helps legs not feel h ulcer.      ---  Prednisone started 8/17/20, 10/2020 decreased to 40mg due to sweating, 50mg on 10/9/20, off in 2/2021  Imuran started 9/2/20- stopped in late 9/2020 due to flu like reaction  IVIG start 10/22/20, second one 11/12  Cellcept started 11/2019 mmol/L 3.7 3.5   Chloride      98 - 112 mmol/L 106 107   Carbon Dioxide, Total      21.0 - 32.0 mmol/L 27.0 28.0   ANION GAP      0 - 18 mmol/L 6 5   BUN      7 - 18 mg/dL 9 8   CREATININE      0.70 - 1.30 mg/dL 0.92 0.89   BUN/CREAT Ratio      10.0 - 20. 0 • CATARACT Bilateral    • COLONOSCOPY  2009    normal with diverticuli   • COLONOSCOPY,DIAGNOSTIC  9/9/09    Performed by Lizzy Vitale at [address] [facility]   • OTHER SURGICAL HISTORY  2009    prostate biop Cap, Take 1 capsule by mouth daily. , Disp: , Rfl:   •  temazepam 15 MG Oral Cap, Take 1 capsule (15 mg total) by mouth nightly as needed for Sleep., Disp: 30 capsule, Rfl: 1  •  omega-3 fatty acids 1000 MG Oral Cap, Take 1,000 mg by mouth daily. , Disp: , R brachioradialis, triceps, knee jerk, and ankle jerk. Plantar responses were flexor bilaterally. Sensory exam revealed normal light touch perception. Vibratory perception and proprioception were intact at the toes.  Pinprick and temperature were normal. Ro

## 2021-07-26 ENCOUNTER — OFFICE VISIT (OUTPATIENT)
Dept: ORTHOPEDICS CLINIC | Facility: CLINIC | Age: 61
End: 2021-07-26
Payer: COMMERCIAL

## 2021-07-26 VITALS — WEIGHT: 243 LBS | BODY MASS INDEX: 32.91 KG/M2 | HEIGHT: 72 IN

## 2021-07-26 DIAGNOSIS — M17.12 PRIMARY OSTEOARTHRITIS OF LEFT KNEE: Primary | ICD-10-CM

## 2021-07-26 PROCEDURE — 99213 OFFICE O/P EST LOW 20 MIN: CPT | Performed by: ORTHOPAEDIC SURGERY

## 2021-07-26 PROCEDURE — 20610 DRAIN/INJ JOINT/BURSA W/O US: CPT | Performed by: ORTHOPAEDIC SURGERY

## 2021-07-26 PROCEDURE — 3008F BODY MASS INDEX DOCD: CPT | Performed by: ORTHOPAEDIC SURGERY

## 2021-07-26 RX ORDER — TRIAMCINOLONE ACETONIDE 40 MG/ML
40 INJECTION, SUSPENSION INTRA-ARTICULAR; INTRAMUSCULAR ONCE
Status: COMPLETED | OUTPATIENT
Start: 2021-07-26 | End: 2021-07-26

## 2021-07-26 RX ADMIN — TRIAMCINOLONE ACETONIDE 40 MG: 40 INJECTION, SUSPENSION INTRA-ARTICULAR; INTRAMUSCULAR at 16:25:00

## 2021-07-26 NOTE — PROGRESS NOTES
[facility] Follow-up Progress Note      Chief Complaint: Bilateral knee pain left greater than right      History: The patient is a 38-year-old male presenting for assessment of his of bilateral knee pain.   The left side is more bothersome than correlates with his anterior and medial knee pain. The left is more affected on the plain films and this is consistent with his symptoms and complaints.   We discussed treatment options including activity protection, weight loss, anti-inflammatories includ

## 2021-07-29 DIAGNOSIS — G70.00 MYASTHENIA GRAVIS (HCC): ICD-10-CM

## 2021-07-29 NOTE — TELEPHONE ENCOUNTER
Medication: Pyridostigmine Bromide 60 MG Oral Tab    Date of last refill: 12/23/2020   [place]    Last office visit: 7/21/2021  Due back to clinic per last office note:  3 months  Date next office visit scheduled: yes

## 2021-07-30 RX ORDER — PYRIDOSTIGMINE BROMIDE 60 MG/1
TABLET ORAL
Qty: 90 TABLET | Refills: 0 | Status: SHIPPED | OUTPATIENT
Start: 2021-07-30 | End: 2022-01-03

## 2021-08-02 ENCOUNTER — MED REC SCAN ONLY (OUTPATIENT)
Dept: FAMILY MEDICINE CLINIC | Facility: CLINIC | Age: 61
End: 2021-08-02

## 2021-08-12 ENCOUNTER — PATIENT MESSAGE (OUTPATIENT)
Dept: NEUROLOGY | Facility: CLINIC | Age: 61
End: 2021-08-12

## 2021-08-12 DIAGNOSIS — G70.00 MYASTHENIA GRAVIS (HCC): ICD-10-CM

## 2021-08-12 DIAGNOSIS — R25.2 CRAMP IN LOWER LEG: Primary | ICD-10-CM

## 2021-08-12 RX ORDER — MYCOPHENOLATE MOFETIL 500 MG/1
TABLET ORAL
Qty: 270 TABLET | Refills: 0 | Status: SHIPPED | OUTPATIENT
Start: 2021-08-12 | End: 2021-10-06

## 2021-08-13 ENCOUNTER — LAB ENCOUNTER (OUTPATIENT)
Dept: LAB | Age: 61
End: 2021-08-13
Attending: FAMILY MEDICINE
Payer: COMMERCIAL

## 2021-08-13 DIAGNOSIS — G70.00 MYASTHENIA GRAVIS (HCC): ICD-10-CM

## 2021-08-13 LAB
BASOPHILS # BLD AUTO: 0.12 X10(3) UL (ref 0–0.2)
BASOPHILS NFR BLD AUTO: 1.7 %
EOSINOPHIL # BLD AUTO: 0.26 X10(3) UL (ref 0–0.7)
EOSINOPHIL NFR BLD AUTO: 3.8 %
ERYTHROCYTE [DISTWIDTH] IN BLOOD BY AUTOMATED COUNT: 21.7 %
HCT VFR BLD AUTO: 39.9 %
HGB BLD-MCNC: 12.4 G/DL
IMM GRANULOCYTES # BLD AUTO: 0.14 X10(3) UL (ref 0–1)
IMM GRANULOCYTES NFR BLD: 2 %
LYMPHOCYTES # BLD AUTO: 1.23 X10(3) UL (ref 1–4)
LYMPHOCYTES NFR BLD AUTO: 17.9 %
MCH RBC QN AUTO: 25.1 PG (ref 26–34)
MCHC RBC AUTO-ENTMCNC: 31.1 G/DL (ref 31–37)
MCV RBC AUTO: 80.6 FL
MONOCYTES # BLD AUTO: 0.74 X10(3) UL (ref 0.1–1)
MONOCYTES NFR BLD AUTO: 10.7 %
NEUTROPHILS # BLD AUTO: 4.4 X10 (3) UL (ref 1.5–7.7)
NEUTROPHILS # BLD AUTO: 4.4 X10(3) UL (ref 1.5–7.7)
NEUTROPHILS NFR BLD AUTO: 63.9 %
PLATELET # BLD AUTO: 178 10(3)UL (ref 150–450)
RBC # BLD AUTO: 4.95 X10(6)UL
WBC # BLD AUTO: 6.9 X10(3) UL (ref 4–11)

## 2021-08-13 PROCEDURE — 85025 COMPLETE CBC W/AUTO DIFF WBC: CPT | Performed by: OTHER

## 2021-08-13 NOTE — TELEPHONE ENCOUNTER
From: Tc Dwyer  Sent: 8/12/2021 7:59 PM CDT  To: Brittanie Kamara Nurse  Subject: Senia Aguirre I’ll get that done and sorry for getting back to you so late on the mestinon . I tried once a day and couldn’t do it .  I’m doing 3 times a day and I

## 2021-08-13 NOTE — TELEPHONE ENCOUNTER
To: NICOLE SALMERON NURSE      From: Lluvia Mtz      Created: 8/12/2021 7:59 PM          Clarissa Pro get that done and sorry for getting back to you so late on the mestinon . I tried once a day and couldn’t do it .  I’m doing 3 times a day and I’m gonna

## 2021-08-31 ENCOUNTER — PATIENT MESSAGE (OUTPATIENT)
Dept: NEUROLOGY | Facility: CLINIC | Age: 61
End: 2021-08-31

## 2021-08-31 NOTE — TELEPHONE ENCOUNTER
Phil Villaseñor,  12 hours ago (7:30 PM)   WILNER  When I decreased the mestinon to once daily, I felt pressure in my left eye and both legs felt heavy, weak and tired.  I am currently taking mestinon 60mg four times a day and increa

## 2021-09-04 DIAGNOSIS — G47.9 SLEEP DIFFICULTIES: ICD-10-CM

## 2021-09-04 DIAGNOSIS — G70.00 MYASTHENIA GRAVIS (HCC): Primary | ICD-10-CM

## 2021-09-05 DIAGNOSIS — G47.9 SLEEP DIFFICULTIES: ICD-10-CM

## 2021-09-05 NOTE — TELEPHONE ENCOUNTER
LOV: 7/7/21  Last Refill: 10/29/2020, #30, 1 RF  Next OV: 10/6/21    Please authorize if acceptable. Thank you!

## 2021-09-06 RX ORDER — TEMAZEPAM 15 MG/1
15 CAPSULE ORAL NIGHTLY PRN
Qty: 30 CAPSULE | Refills: 0 | Status: SHIPPED | OUTPATIENT
Start: 2021-09-06 | End: 2021-10-22

## 2021-09-07 RX ORDER — MYCOPHENOLATE MOFETIL 500 MG/1
1000 TABLET ORAL 2 TIMES DAILY
Qty: 120 TABLET | Refills: 1 | Status: SHIPPED | OUTPATIENT
Start: 2021-09-07 | End: 2022-01-17

## 2021-09-07 NOTE — TELEPHONE ENCOUNTER
Medication: Mycophenolate Mofetil 500 MG     Date of last refill: 7/21/21 (#90/1)  [place]     Last office visit: 7/21/2021  Due back to clinic per last office note:  3 months  Date next office visit scheduled:    Future

## 2021-09-08 RX ORDER — TEMAZEPAM 15 MG/1
CAPSULE ORAL
Qty: 30 CAPSULE | Refills: 0 | OUTPATIENT
Start: 2021-09-08

## 2021-09-08 RX ORDER — GABAPENTIN 100 MG/1
CAPSULE ORAL
Qty: 90 CAPSULE | Refills: 0 | Status: SHIPPED | OUTPATIENT
Start: 2021-09-08

## 2021-09-09 NOTE — TELEPHONE ENCOUNTER
Medication: MYCOPHENOLATE MOFETIL 500 MG Oral Tab    Date of last refill: 02/03/2021 (#60/1)  [place]    Last office visit: 02/03/2021  Due back to clinic per last office note:  Around 05/03/2021  Date next office vi
Yes - the patient is able to be screened

## 2021-09-10 ENCOUNTER — TELEPHONE (OUTPATIENT)
Dept: NEUROLOGY | Facility: CLINIC | Age: 61
End: 2021-09-10

## 2021-09-10 NOTE — TELEPHONE ENCOUNTER
pt would like to know if he can combine his [facility] appt on 11/3 in [address] with f/u appt he has on 11/4/ in [place]; pls call

## 2021-09-24 ENCOUNTER — IMMUNIZATION (OUTPATIENT)
Dept: LAB | Facility: HOSPITAL | Age: 61
End: 2021-09-24
Attending: EMERGENCY MEDICINE
Payer: COMMERCIAL

## 2021-09-24 DIAGNOSIS — Z23 NEED FOR VACCINATION: Primary | ICD-10-CM

## 2021-09-24 PROCEDURE — 0013A SARSCOV2 VAC 100MCG/0.5ML IM: CPT

## 2021-09-30 RX ORDER — LISINOPRIL 20 MG/1
20 TABLET ORAL DAILY
Qty: 90 TABLET | Refills: 1 | Status: SHIPPED | OUTPATIENT
Start: 2021-09-30 | End: 2022-03-22

## 2021-10-02 ENCOUNTER — LAB ENCOUNTER (OUTPATIENT)
Dept: LAB | Age: 61
End: 2021-10-02
Attending: FAMILY MEDICINE
Payer: COMMERCIAL

## 2021-10-02 DIAGNOSIS — E78.00 HYPERCHOLESTEREMIA: ICD-10-CM

## 2021-10-02 DIAGNOSIS — T38.0X5D STEROID-INDUCED DIABETES MELLITUS, SUBSEQUENT ENCOUNTER (HCC): ICD-10-CM

## 2021-10-02 DIAGNOSIS — D50.0 IRON DEFICIENCY ANEMIA DUE TO CHRONIC BLOOD LOSS: ICD-10-CM

## 2021-10-02 DIAGNOSIS — E09.9 STEROID-INDUCED DIABETES MELLITUS, SUBSEQUENT ENCOUNTER (HCC): ICD-10-CM

## 2021-10-02 PROCEDURE — 83036 HEMOGLOBIN GLYCOSYLATED A1C: CPT | Performed by: FAMILY MEDICINE

## 2021-10-02 PROCEDURE — 83550 IRON BINDING TEST: CPT | Performed by: FAMILY MEDICINE

## 2021-10-02 PROCEDURE — 83540 ASSAY OF IRON: CPT | Performed by: FAMILY MEDICINE

## 2021-10-02 PROCEDURE — 80061 LIPID PANEL: CPT | Performed by: FAMILY MEDICINE

## 2021-10-02 PROCEDURE — 82728 ASSAY OF FERRITIN: CPT | Performed by: FAMILY MEDICINE

## 2021-10-02 PROCEDURE — 80053 COMPREHEN METABOLIC PANEL: CPT | Performed by: FAMILY MEDICINE

## 2021-10-02 PROCEDURE — 3044F HG A1C LEVEL LT 7.0%: CPT | Performed by: FAMILY MEDICINE

## 2021-10-06 ENCOUNTER — OFFICE VISIT (OUTPATIENT)
Dept: FAMILY MEDICINE CLINIC | Facility: CLINIC | Age: 61
End: 2021-10-06
Payer: COMMERCIAL

## 2021-10-06 VITALS
HEART RATE: 78 BPM | BODY MASS INDEX: 34 KG/M2 | WEIGHT: 251 LBS | SYSTOLIC BLOOD PRESSURE: 132 MMHG | DIASTOLIC BLOOD PRESSURE: 68 MMHG | TEMPERATURE: 99 F | HEIGHT: 72 IN | OXYGEN SATURATION: 98 % | RESPIRATION RATE: 18 BRPM

## 2021-10-06 DIAGNOSIS — E78.00 HYPERCHOLESTEROLEMIA: ICD-10-CM

## 2021-10-06 DIAGNOSIS — R73.9 HYPERGLYCEMIA: ICD-10-CM

## 2021-10-06 DIAGNOSIS — D50.0 IRON DEFICIENCY ANEMIA DUE TO CHRONIC BLOOD LOSS: ICD-10-CM

## 2021-10-06 DIAGNOSIS — G70.00 MYASTHENIA GRAVIS (HCC): ICD-10-CM

## 2021-10-06 DIAGNOSIS — Z23 NEED FOR VACCINATION: ICD-10-CM

## 2021-10-06 DIAGNOSIS — I10 ESSENTIAL HYPERTENSION: Primary | ICD-10-CM

## 2021-10-06 PROCEDURE — 90471 IMMUNIZATION ADMIN: CPT | Performed by: FAMILY MEDICINE

## 2021-10-06 PROCEDURE — 3075F SYST BP GE 130 - 139MM HG: CPT | Performed by: FAMILY MEDICINE

## 2021-10-06 PROCEDURE — 3078F DIAST BP <80 MM HG: CPT | Performed by: FAMILY MEDICINE

## 2021-10-06 PROCEDURE — 90686 IIV4 VACC NO PRSV 0.5 ML IM: CPT | Performed by: FAMILY MEDICINE

## 2021-10-06 PROCEDURE — 3008F BODY MASS INDEX DOCD: CPT | Performed by: FAMILY MEDICINE

## 2021-10-06 PROCEDURE — 99214 OFFICE O/P EST MOD 30 MIN: CPT | Performed by: FAMILY MEDICINE

## 2021-10-06 NOTE — PATIENT INSTRUCTIONS
Call [phone] to schedule US thyroid. Continue current meds. Watch diet for fats and carbs. Stay active. Do fasting blood work prior next visit. Schedule follow-up appointment January 2022.

## 2021-10-06 NOTE — PROGRESS NOTES
Christina Golden is a 59year old male. cc hypertension, hyperglycemia, anemia, myasthenia gravis hypercholesterolemia.   HPI:   Patient is coming to the office for follow-up on hypertension he is taking his medications regularly doing well denies any jeanette • Turmeric 500 MG Oral Cap Take 1 capsule by mouth every evening. • Secukinumab 150 MG/ML Subcutaneous Solution Auto-injector Inject 300 mg into the skin every 30 (thirty) days.        • VITAMIN D 400 UNIT OR CAPS 1 CAPSULE DAILY     • gabapentin 10 132/68 (BP Location: Left arm, Patient Position: Sitting, Cuff Size: adult)   Pulse 78   Temp 98.9 °F (37.2 °C) (Oral)   Resp 18   Ht 6' (1.829 m)   Wt 251 lb (113.9 kg)   SpO2 98%   BMI 34.04 kg/m²   GENERAL: well developed, well nourished,in no apparent - 4.4 g/dL    A/G Ratio 0.9 (L) 1.0 - 2.0    FASTING Yes      ASSESSMENT AND PLAN:     Essential hypertension  (primary encounter diagnosis)  Hyperglycemia  Iron deficiency anemia due to chronic blood loss  Myasthenia gravis (hcc)  Hypercholesterolemia

## 2021-10-11 ENCOUNTER — HOSPITAL ENCOUNTER (OUTPATIENT)
Dept: ULTRASOUND IMAGING | Age: 61
Discharge: HOME | End: 2021-10-11
Attending: FAMILY MEDICINE
Payer: COMMERCIAL

## 2021-10-11 DIAGNOSIS — R94.6 ABNORMAL FINDING ON EXAMINATION OF THYROID GLAND: ICD-10-CM

## 2021-10-11 PROCEDURE — 76536 US EXAM OF HEAD AND NECK: CPT | Performed by: FAMILY MEDICINE

## 2021-10-22 DIAGNOSIS — G47.9 SLEEP DIFFICULTIES: ICD-10-CM

## 2021-10-22 RX ORDER — TEMAZEPAM 15 MG/1
CAPSULE ORAL
Qty: 30 CAPSULE | Refills: 1 | Status: SHIPPED | OUTPATIENT
Start: 2021-10-22 | End: 2022-01-17

## 2021-10-22 NOTE — TELEPHONE ENCOUNTER
LOV: 10/6/2021  For:lab results  Patient advised to RTC on: January 2022  Previous Rx Temazepam 15 mg oral cap  à Sig: Take 1 capsule by mouth nightly as needed for sleep . Disp # 30 / 0 refills.   LF: 9/6/21   (chart indicates patient will be calling for r

## 2021-10-25 ENCOUNTER — TELEPHONE (OUTPATIENT)
Dept: NEUROLOGY | Facility: CLINIC | Age: 61
End: 2021-10-25

## 2021-10-25 NOTE — TELEPHONE ENCOUNTER
Silvio TREJO [facility] requesting all medical records. Sent to scanning on 10/25/21. Sent to scan stat on 10/25/21 along with $25.00 check.

## 2021-10-27 ENCOUNTER — TELEPHONE (OUTPATIENT)
Dept: NEUROLOGY | Facility: CLINIC | Age: 61
End: 2021-10-27

## 2021-11-01 NOTE — TELEPHONE ENCOUNTER
Discussed with RN supervisor and at this time. Appts should not be combined due to billing purposes. Patient was made aware of above.

## 2021-11-03 ENCOUNTER — PROCEDURE VISIT (OUTPATIENT)
Dept: NEUROLOGY | Facility: CLINIC | Age: 61
End: 2021-11-03
Payer: COMMERCIAL

## 2021-11-03 DIAGNOSIS — R25.2 CRAMP IN LOWER LEG: Primary | ICD-10-CM

## 2021-11-03 PROCEDURE — 95886 MUSC TEST DONE W/N TEST COMP: CPT | Performed by: OTHER

## 2021-11-03 PROCEDURE — 95909 NRV CNDJ TST 5-6 STUDIES: CPT | Performed by: OTHER

## 2021-11-03 NOTE — PROCEDURES
[facility]  Nerve Conduction & EMG Report    [address], [address]  Phone: [phone]            Full Name: Rei Cool Gender: Male  Patient ID: [id number] Date of Birth: [birth year] None None None N N N N   L. Gastrocnemius (Medial head) Tibial S1-S2 N None None None None N N N N   L. Peroneus longus Perineal L5-S1 N None None None None N N N N   L. Lumbar paraspinals (low)  - N None None None None N N N N           Summary:   1.  The

## 2021-11-04 ENCOUNTER — OFFICE VISIT (OUTPATIENT)
Dept: NEUROLOGY | Facility: CLINIC | Age: 61
End: 2021-11-04
Payer: COMMERCIAL

## 2021-11-04 VITALS
HEART RATE: 72 BPM | BODY MASS INDEX: 34 KG/M2 | SYSTOLIC BLOOD PRESSURE: 136 MMHG | WEIGHT: 251 LBS | DIASTOLIC BLOOD PRESSURE: 70 MMHG | RESPIRATION RATE: 16 BRPM

## 2021-11-04 DIAGNOSIS — R25.2 CRAMP IN LOWER LEG: ICD-10-CM

## 2021-11-04 DIAGNOSIS — M54.16 LUMBAR RADICULOPATHY: ICD-10-CM

## 2021-11-04 DIAGNOSIS — G70.00 MYASTHENIA GRAVIS (HCC): Primary | ICD-10-CM

## 2021-11-04 DIAGNOSIS — D50.8 OTHER IRON DEFICIENCY ANEMIA: ICD-10-CM

## 2021-11-04 DIAGNOSIS — R29.898 WEAKNESS OF BOTH LOWER EXTREMITIES: ICD-10-CM

## 2021-11-04 PROCEDURE — 3075F SYST BP GE 130 - 139MM HG: CPT | Performed by: OTHER

## 2021-11-04 PROCEDURE — 99214 OFFICE O/P EST MOD 30 MIN: CPT | Performed by: OTHER

## 2021-11-04 PROCEDURE — 3078F DIAST BP <80 MM HG: CPT | Performed by: OTHER

## 2021-11-04 RX ORDER — SECUKINUMAB 150 MG/ML
2 INJECTION SUBCUTANEOUS
COMMUNITY
Start: 2021-10-15

## 2021-11-04 NOTE — PROGRESS NOTES
[address]   Neurology    Thaddeus See Patient Status:  No patient class for patient encounter    1960 MRN [id number]   Location [address] PCP Judit May MD               can focusing on something visually. Taking mestinon 60/60/60/30mg. His speech is less raspy. Diplopia is improved with 40mg of prednisone. Balance is a little worse, L knee is very painful. Going upstairs feels fatiguing.  Walking slow helps legs not feel h last visit, patient reports cramps in L > R calf are better, but occasionally now gets cramp in left foot. Also very rarely, has tingling in the outer left two toes. Reports overall leg weakness is better. Hgb also has been slowly coming up.  His legs still 4.26 (L)   Hemoglobin      13.0 - 17.5 g/dL 11.2 (L) 10.8 (L) 9.9 (L)   Hematocrit      39.0 - 53.0 % 38.6 (L) 37.1 (L) 34.5 (L)   Platelet Count      750.1 - 450.0 10(3)uL 230.0 277.0 277.0   MCV      80.0 - 100.0 fL 79.4 (L) 78.9 (L) 81.0   MCH      26. 0 NON-AFR.  AMERICAN      >=60 85    eGFR       >=60 98    ALT (SGPT)      16 - 61 U/L 109 (H) 87 (H)   AST (SGOT)      15 - 37 U/L 77 (H) 50 (H)       Past Medical History:  Past Medical History:   Diagnosis Date   • Anemia    • Diabetes ([facility] [address]. Disp: 180 tablet, Rfl: 1  •  pyridostigmine 60 MG Oral Tab, Take 1 tablet 3 times a day during wakeful hours, as needed. , Disp: 90 tablet, Rfl: 0  •  Diclofenac Sodium 1 % External Gel, Apply topically 4 (four) times daily. , Disp: , Rfl:   •  PANTOPRAZOLE sharp. Pupils were round and reacted to light. Extraocular movements were full, + subjective diplopia with upgaze only.  There is left eye minimal ptosis, but also present on ID picture from 1/2020, question if anatomical. There was no jaw, palate or tongue medications to avoid, including certain antibiotics, bblockers, CCB    Requested Prescriptions      No prescriptions requested or ordered in this encounter          We discussed in depth regarding the diagnosis, prognosis, treatment.  The patient was given

## 2021-11-22 RX ORDER — PANTOPRAZOLE SODIUM 40 MG/1
TABLET, DELAYED RELEASE ORAL
Qty: 180 TABLET | Refills: 0 | Status: SHIPPED | OUTPATIENT
Start: 2021-11-22 | End: 2021-12-20

## 2021-11-22 NOTE — TELEPHONE ENCOUNTER
PANTOPRAZOLE 40MG TABLETS    Please see pended medications. Please sign if appropriate.       Thank you      Last OV: 10/06/2021       Last refill: 04/3/2021    Upcoming appt is scheduled for 03/07/2022

## 2021-12-08 ENCOUNTER — APPOINTMENT (OUTPATIENT)
Dept: URBAN - METROPOLITAN AREA CLINIC 321 | Age: 61
Setting detail: DERMATOLOGY
End: 2021-12-08

## 2021-12-08 DIAGNOSIS — Z79.899 OTHER LONG TERM (CURRENT) DRUG THERAPY: ICD-10-CM

## 2021-12-08 PROCEDURE — OTHER ORDER TESTS: OTHER

## 2021-12-20 RX ORDER — PANTOPRAZOLE SODIUM 40 MG/1
TABLET, DELAYED RELEASE ORAL
Qty: 180 TABLET | Refills: 0 | Status: SHIPPED | OUTPATIENT
Start: 2021-12-20

## 2021-12-23 ENCOUNTER — LAB ENCOUNTER (OUTPATIENT)
Dept: LAB | Age: 61
End: 2021-12-23
Attending: Other
Payer: COMMERCIAL

## 2021-12-23 DIAGNOSIS — Z79.899 NEED FOR PROPHYLACTIC CHEMOTHERAPY: Primary | ICD-10-CM

## 2021-12-23 DIAGNOSIS — G70.00 MYASTHENIA GRAVIS (HCC): ICD-10-CM

## 2021-12-23 PROCEDURE — 85025 COMPLETE CBC W/AUTO DIFF WBC: CPT

## 2021-12-23 PROCEDURE — 86480 TB TEST CELL IMMUN MEASURE: CPT

## 2021-12-23 PROCEDURE — 36415 COLL VENOUS BLD VENIPUNCTURE: CPT

## 2021-12-23 PROCEDURE — 80053 COMPREHEN METABOLIC PANEL: CPT

## 2021-12-29 ENCOUNTER — RX ONLY (RX ONLY)
Age: 61
End: 2021-12-29

## 2021-12-29 ENCOUNTER — APPOINTMENT (OUTPATIENT)
Dept: URBAN - METROPOLITAN AREA CLINIC 321 | Age: 61
Setting detail: DERMATOLOGY
End: 2021-12-29

## 2021-12-29 DIAGNOSIS — L40.0 PSORIASIS VULGARIS: ICD-10-CM

## 2021-12-29 DIAGNOSIS — G70.00 MYASTHENIA GRAVIS (HCC): ICD-10-CM

## 2021-12-29 PROCEDURE — 99214 OFFICE O/P EST MOD 30 MIN: CPT

## 2021-12-29 PROCEDURE — OTHER COUNSELING: OTHER

## 2021-12-29 PROCEDURE — OTHER PRESCRIPTION: OTHER

## 2021-12-29 RX ORDER — SECUKINUMAB 150 MG/ML
INJECTION SUBCUTANEOUS Q4WEEKS
Qty: 1 | Refills: 12 | COMMUNITY
Start: 2021-12-29

## 2021-12-29 RX ORDER — DESOXIMETASONE 2.5 MG/ML
SPRAY TOPICAL
Qty: 100 | Refills: 1 | Status: ERX | COMMUNITY
Start: 2021-12-29

## 2021-12-29 RX ORDER — SECUKINUMAB 150 MG/ML
INJECTION SUBCUTANEOUS Q4WEEKS
Qty: 1 | Refills: 12 | Status: ERX

## 2021-12-29 ASSESSMENT — LOCATION ZONE DERM: LOCATION ZONE: TRUNK

## 2021-12-29 ASSESSMENT — LOCATION SIMPLE DESCRIPTION DERM: LOCATION SIMPLE: UPPER BACK

## 2021-12-29 ASSESSMENT — LOCATION DETAILED DESCRIPTION DERM: LOCATION DETAILED: INFERIOR THORACIC SPINE

## 2021-12-30 NOTE — TELEPHONE ENCOUNTER
Medication: PYRIDOSTIGMINE 60 MG     Date of last refill: 7/30/21 (#90/0R)  [place]     Last office visit: 11/4/21  Due back to clinic per last office note:  4 mon  Date next office visit scheduled:    Future Appoint

## 2022-01-03 RX ORDER — PYRIDOSTIGMINE BROMIDE 60 MG/1
TABLET ORAL
Qty: 90 TABLET | Refills: 0 | Status: SHIPPED | OUTPATIENT
Start: 2022-01-03

## 2022-01-10 ENCOUNTER — TELEPHONE (OUTPATIENT)
Dept: FAMILY MEDICINE CLINIC | Facility: CLINIC | Age: 62
End: 2022-01-10

## 2022-01-10 NOTE — TELEPHONE ENCOUNTER
Medical Records Request received from [employer] for records from last 5 years - present. Release original sent to [employer] on 1/10/22. Copy sent to scanning.

## 2022-01-15 ENCOUNTER — TELEPHONE (OUTPATIENT)
Dept: FAMILY MEDICINE CLINIC | Facility: CLINIC | Age: 62
End: 2022-01-15

## 2022-01-15 DIAGNOSIS — G47.9 SLEEP DIFFICULTIES: ICD-10-CM

## 2022-01-17 DIAGNOSIS — G70.00 MYASTHENIA GRAVIS (HCC): ICD-10-CM

## 2022-01-17 RX ORDER — MYCOPHENOLATE MOFETIL 500 MG/1
1000 TABLET ORAL 2 TIMES DAILY
Qty: 120 TABLET | Refills: 1 | Status: SHIPPED | OUTPATIENT
Start: 2022-01-17

## 2022-01-17 RX ORDER — TEMAZEPAM 15 MG/1
CAPSULE ORAL
Qty: 30 CAPSULE | Refills: 1 | Status: SHIPPED | OUTPATIENT
Start: 2022-01-17

## 2022-01-17 NOTE — TELEPHONE ENCOUNTER
LOV note on 10/06/21 sts repeat blood work in 3 months, and f/u in January 2022. Orders already in place for CMP, lipids, A1c, CBC, Iron and TIBC, ferritin    Call to pt's cell. Reaches identified VM.  Per HIPAA consent, LVM requesting call back to triage

## 2022-01-17 NOTE — TELEPHONE ENCOUNTER
Pt called for refill on  Mycophenolate Mofetil 500 MG Oral Tab. Pt only has 3 pills left.     [facility] [id number] [phone], [phone]

## 2022-01-17 NOTE — TELEPHONE ENCOUNTER
Medication: Mycophenolate Mofetil 500 MG Oral Tab     Date of last refill: 09/07/2021 (#120/1)  [place]      Last office visit: 11/04/2021  Due back to clinic per last office note:  4 months    Date next office visit joseph

## 2022-01-17 NOTE — TELEPHONE ENCOUNTER
Received live call from pt. Advised of recommendations below---that he was instructed to complete blood work and f/u in ofc 3 months after his ofc visit on 10/06/21. He is agreeable to repeat blood work at this time. Instructed to be fasting.  Sts he will

## 2022-02-14 NOTE — TELEPHONE ENCOUNTER
temazepam 15 MG Oral Cap    Please see pended medications. Please sign if appropriate.       Thank you      Last OV: 10/06/2021      Last refill: 01/17/2022 for 30/1 refills      Upcoming appt is scheduled for 03/22/2022

## 2022-02-15 RX ORDER — TEMAZEPAM 15 MG/1
15 CAPSULE ORAL NIGHTLY PRN
Qty: 30 CAPSULE | Refills: 1 | Status: SHIPPED | OUTPATIENT
Start: 2022-02-15 | End: 2022-03-22

## 2022-02-15 RX ORDER — PYRIDOSTIGMINE BROMIDE 60 MG/1
TABLET ORAL
Qty: 90 TABLET | Refills: 0 | Status: SHIPPED | OUTPATIENT
Start: 2022-02-15

## 2022-02-15 NOTE — TELEPHONE ENCOUNTER
Patient needs to get blood work- cbc and cmp, please inform. Still supposed to be monthly for a few more months. I put in a one time order.

## 2022-02-27 ENCOUNTER — LAB ENCOUNTER (OUTPATIENT)
Dept: LAB | Facility: HOSPITAL | Age: 62
End: 2022-02-27
Attending: FAMILY MEDICINE
Payer: COMMERCIAL

## 2022-02-27 DIAGNOSIS — G70.00 MYASTHENIA GRAVIS (HCC): ICD-10-CM

## 2022-02-27 DIAGNOSIS — E78.00 HYPERCHOLESTEROLEMIA: ICD-10-CM

## 2022-02-27 DIAGNOSIS — R73.9 HYPERGLYCEMIA: ICD-10-CM

## 2022-02-27 DIAGNOSIS — D50.0 IRON DEFICIENCY ANEMIA DUE TO CHRONIC BLOOD LOSS: ICD-10-CM

## 2022-02-27 LAB
ALBUMIN SERPL-MCNC: 3.5 G/DL (ref 3.4–5)
ALBUMIN/GLOB SERPL: 1 {RATIO} (ref 1–2)
ALP LIVER SERPL-CCNC: 89 U/L
ALT SERPL-CCNC: 94 U/L
ANION GAP SERPL CALC-SCNC: 5 MMOL/L (ref 0–18)
AST SERPL-CCNC: 67 U/L (ref 15–37)
BASOPHILS # BLD AUTO: 0.09 X10(3) UL (ref 0–0.2)
BASOPHILS NFR BLD AUTO: 1.2 %
BUN BLD-MCNC: 13 MG/DL (ref 7–18)
CALCIUM BLD-MCNC: 9.1 MG/DL (ref 8.5–10.1)
CHLORIDE SERPL-SCNC: 105 MMOL/L (ref 98–112)
CHOLEST SERPL-MCNC: 170 MG/DL (ref ?–200)
CO2 SERPL-SCNC: 29 MMOL/L (ref 21–32)
CREAT BLD-MCNC: 0.82 MG/DL
DEPRECATED HBV CORE AB SER IA-ACNC: 12.2 NG/ML
EOSINOPHIL # BLD AUTO: 0.41 X10(3) UL (ref 0–0.7)
EOSINOPHIL NFR BLD AUTO: 5.7 %
ERYTHROCYTE [DISTWIDTH] IN BLOOD BY AUTOMATED COUNT: 15 %
EST. AVERAGE GLUCOSE BLD GHB EST-MCNC: 123 MG/DL (ref 68–126)
FASTING PATIENT LIPID ANSWER: YES
FASTING STATUS PATIENT QL REPORTED: YES
GLOBULIN PLAS-MCNC: 3.5 G/DL (ref 2.8–4.4)
GLUCOSE BLD-MCNC: 96 MG/DL (ref 70–99)
HBA1C MFR BLD: 5.9 % (ref ?–5.7)
HCT VFR BLD AUTO: 40 %
HDLC SERPL-MCNC: 43 MG/DL (ref 40–59)
HGB BLD-MCNC: 11.5 G/DL
IMM GRANULOCYTES # BLD AUTO: 0.1 X10(3) UL (ref 0–1)
IMM GRANULOCYTES NFR BLD: 1.4 %
IRON SATN MFR SERPL: 8 %
IRON SERPL-MCNC: 40 UG/DL
LDLC SERPL CALC-MCNC: 102 MG/DL (ref ?–100)
LYMPHOCYTES # BLD AUTO: 1.19 X10(3) UL (ref 1–4)
LYMPHOCYTES NFR BLD AUTO: 16.5 %
MCH RBC QN AUTO: 23.8 PG (ref 26–34)
MCHC RBC AUTO-ENTMCNC: 28.8 G/DL (ref 31–37)
MCV RBC AUTO: 82.8 FL
MONOCYTES # BLD AUTO: 0.91 X10(3) UL (ref 0.1–1)
MONOCYTES NFR BLD AUTO: 12.6 %
NEUTROPHILS # BLD AUTO: 4.52 X10 (3) UL (ref 1.5–7.7)
NEUTROPHILS # BLD AUTO: 4.52 X10(3) UL (ref 1.5–7.7)
NEUTROPHILS NFR BLD AUTO: 62.6 %
NONHDLC SERPL-MCNC: 127 MG/DL (ref ?–130)
OSMOLALITY SERPL CALC.SUM OF ELEC: 288 MOSM/KG (ref 275–295)
PLATELET # BLD AUTO: 196 10(3)UL (ref 150–450)
POTASSIUM SERPL-SCNC: 4.2 MMOL/L (ref 3.5–5.1)
PROT SERPL-MCNC: 7 G/DL (ref 6.4–8.2)
RBC # BLD AUTO: 4.83 X10(6)UL
SODIUM SERPL-SCNC: 139 MMOL/L (ref 136–145)
TIBC SERPL-MCNC: 530 UG/DL (ref 240–450)
TRANSFERRIN SERPL-MCNC: 356 MG/DL (ref 200–360)
TRIGL SERPL-MCNC: 139 MG/DL (ref 30–149)
VLDLC SERPL CALC-MCNC: 23 MG/DL (ref 0–30)
WBC # BLD AUTO: 7.2 X10(3) UL (ref 4–11)

## 2022-02-27 PROCEDURE — 83540 ASSAY OF IRON: CPT

## 2022-02-27 PROCEDURE — 83036 HEMOGLOBIN GLYCOSYLATED A1C: CPT

## 2022-02-27 PROCEDURE — 36415 COLL VENOUS BLD VENIPUNCTURE: CPT

## 2022-02-27 PROCEDURE — 3044F HG A1C LEVEL LT 7.0%: CPT | Performed by: FAMILY MEDICINE

## 2022-02-27 PROCEDURE — 80053 COMPREHEN METABOLIC PANEL: CPT

## 2022-02-27 PROCEDURE — 83550 IRON BINDING TEST: CPT

## 2022-02-27 PROCEDURE — 82728 ASSAY OF FERRITIN: CPT

## 2022-02-27 PROCEDURE — 85025 COMPLETE CBC W/AUTO DIFF WBC: CPT

## 2022-02-27 PROCEDURE — 80061 LIPID PANEL: CPT

## 2022-03-01 RX ORDER — MELATONIN
325
Refills: 0 | COMMUNITY
Start: 2022-03-01

## 2022-03-02 RX ORDER — MYCOPHENOLATE MOFETIL 500 MG/1
1000 TABLET ORAL 2 TIMES DAILY
Qty: 120 TABLET | Refills: 0 | Status: SHIPPED | OUTPATIENT
Start: 2022-03-02

## 2022-03-04 ENCOUNTER — TELEPHONE (OUTPATIENT)
Dept: FAMILY MEDICINE CLINIC | Facility: CLINIC | Age: 62
End: 2022-03-04

## 2022-03-04 NOTE — TELEPHONE ENCOUNTER
Medical Records Request received from  [employer] [facility] [employer] for last 5 years. Release original sent to [employer] on 3/4/2022. Copy sent to scanning.

## 2022-03-07 ENCOUNTER — MED REC SCAN ONLY (OUTPATIENT)
Dept: FAMILY MEDICINE CLINIC | Facility: CLINIC | Age: 62
End: 2022-03-07

## 2022-03-07 ENCOUNTER — OFFICE VISIT (OUTPATIENT)
Dept: NEUROLOGY | Facility: CLINIC | Age: 62
End: 2022-03-07
Payer: COMMERCIAL

## 2022-03-07 VITALS
DIASTOLIC BLOOD PRESSURE: 82 MMHG | WEIGHT: 250 LBS | RESPIRATION RATE: 18 BRPM | HEART RATE: 74 BPM | BODY MASS INDEX: 33.86 KG/M2 | SYSTOLIC BLOOD PRESSURE: 156 MMHG | HEIGHT: 72 IN

## 2022-03-07 DIAGNOSIS — G70.00 MYASTHENIA GRAVIS (HCC): Primary | ICD-10-CM

## 2022-03-07 DIAGNOSIS — D50.8 OTHER IRON DEFICIENCY ANEMIA: ICD-10-CM

## 2022-03-07 DIAGNOSIS — R79.89 ELEVATED LFTS: ICD-10-CM

## 2022-03-07 DIAGNOSIS — R29.898 WEAKNESS OF BOTH LOWER EXTREMITIES: ICD-10-CM

## 2022-03-07 PROCEDURE — 3008F BODY MASS INDEX DOCD: CPT | Performed by: OTHER

## 2022-03-07 PROCEDURE — 3077F SYST BP >= 140 MM HG: CPT | Performed by: OTHER

## 2022-03-07 PROCEDURE — 99214 OFFICE O/P EST MOD 30 MIN: CPT | Performed by: OTHER

## 2022-03-07 PROCEDURE — 3079F DIAST BP 80-89 MM HG: CPT | Performed by: OTHER

## 2022-03-07 RX ORDER — PYRIDOSTIGMINE BROMIDE 60 MG/1
TABLET ORAL
Qty: 120 TABLET | Refills: 0 | Status: SHIPPED | OUTPATIENT
Start: 2022-03-07 | End: 2022-03-08

## 2022-03-07 NOTE — PROGRESS NOTES
LOV 11/4/21 MG f/u- Patient states everything is pretty much the same. Patient states he has good days & bad days. Patient states he never stated the gabapentin.

## 2022-03-09 RX ORDER — PYRIDOSTIGMINE BROMIDE 60 MG/1
TABLET ORAL
Qty: 360 TABLET | Refills: 0 | Status: SHIPPED | OUTPATIENT
Start: 2022-03-09

## 2022-03-22 ENCOUNTER — OFFICE VISIT (OUTPATIENT)
Dept: FAMILY MEDICINE CLINIC | Facility: CLINIC | Age: 62
End: 2022-03-22
Payer: COMMERCIAL

## 2022-03-22 VITALS
RESPIRATION RATE: 16 BRPM | OXYGEN SATURATION: 98 % | WEIGHT: 255 LBS | HEIGHT: 72 IN | SYSTOLIC BLOOD PRESSURE: 136 MMHG | TEMPERATURE: 97 F | BODY MASS INDEX: 34.54 KG/M2 | HEART RATE: 70 BPM | DIASTOLIC BLOOD PRESSURE: 72 MMHG

## 2022-03-22 DIAGNOSIS — Z12.11 SCREEN FOR COLON CANCER: ICD-10-CM

## 2022-03-22 DIAGNOSIS — E78.00 HYPERCHOLESTEROLEMIA: ICD-10-CM

## 2022-03-22 DIAGNOSIS — D50.0 IRON DEFICIENCY ANEMIA DUE TO CHRONIC BLOOD LOSS: ICD-10-CM

## 2022-03-22 DIAGNOSIS — G47.9 SLEEP DIFFICULTIES: ICD-10-CM

## 2022-03-22 DIAGNOSIS — I10 ESSENTIAL HYPERTENSION: Primary | ICD-10-CM

## 2022-03-22 DIAGNOSIS — R73.9 HYPERGLYCEMIA: ICD-10-CM

## 2022-03-22 DIAGNOSIS — G70.00 MYASTHENIA GRAVIS (HCC): ICD-10-CM

## 2022-03-22 PROCEDURE — 3008F BODY MASS INDEX DOCD: CPT | Performed by: FAMILY MEDICINE

## 2022-03-22 PROCEDURE — 3075F SYST BP GE 130 - 139MM HG: CPT | Performed by: FAMILY MEDICINE

## 2022-03-22 PROCEDURE — 3078F DIAST BP <80 MM HG: CPT | Performed by: FAMILY MEDICINE

## 2022-03-22 PROCEDURE — 99214 OFFICE O/P EST MOD 30 MIN: CPT | Performed by: FAMILY MEDICINE

## 2022-03-22 RX ORDER — LISINOPRIL 20 MG/1
20 TABLET ORAL DAILY
Qty: 90 TABLET | Refills: 1 | Status: SHIPPED | OUTPATIENT
Start: 2022-03-22

## 2022-03-22 RX ORDER — TEMAZEPAM 15 MG/1
15 CAPSULE ORAL NIGHTLY PRN
Qty: 30 CAPSULE | Refills: 1 | Status: SHIPPED | OUTPATIENT
Start: 2022-03-22

## 2022-03-22 RX ORDER — PANTOPRAZOLE SODIUM 40 MG/1
40 TABLET, DELAYED RELEASE ORAL
Qty: 180 TABLET | Refills: 1 | Status: SHIPPED | OUTPATIENT
Start: 2022-03-22

## 2022-03-22 NOTE — PATIENT INSTRUCTIONS
Continue current medications. Healthy diet. Try to work on weight loss. Take Protonix twice a day until seen by GI doctor. Set up appointment with GI for evaluation. Schedule physical for July 2022.   Do fasting blood work prior to your physical.

## 2022-04-08 RX ORDER — MYCOPHENOLATE MOFETIL 500 MG/1
TABLET ORAL
Qty: 45 TABLET | Refills: 0 | Status: SHIPPED | OUTPATIENT
Start: 2022-04-08

## 2022-04-18 ENCOUNTER — LAB ENCOUNTER (OUTPATIENT)
Dept: LAB | Age: 62
End: 2022-04-18
Attending: Other
Payer: COMMERCIAL

## 2022-04-18 DIAGNOSIS — G70.00 MYASTHENIA GRAVIS (HCC): ICD-10-CM

## 2022-04-18 LAB
ALBUMIN SERPL-MCNC: 3.7 G/DL (ref 3.4–5)
ALBUMIN/GLOB SERPL: 1.1 {RATIO} (ref 1–2)
ALP LIVER SERPL-CCNC: 86 U/L
ALT SERPL-CCNC: 53 U/L
ANION GAP SERPL CALC-SCNC: 5 MMOL/L (ref 0–18)
AST SERPL-CCNC: 38 U/L (ref 15–37)
BASOPHILS # BLD AUTO: 0.14 X10(3) UL (ref 0–0.2)
BASOPHILS NFR BLD AUTO: 1.7 %
BILIRUB SERPL-MCNC: 0.4 MG/DL (ref 0.1–2)
BUN BLD-MCNC: 12 MG/DL (ref 7–18)
BUN/CREAT SERPL: 13.3 (ref 10–20)
CALCIUM BLD-MCNC: 9.2 MG/DL (ref 8.5–10.1)
CHLORIDE SERPL-SCNC: 102 MMOL/L (ref 98–112)
CO2 SERPL-SCNC: 32 MMOL/L (ref 21–32)
CREAT BLD-MCNC: 0.9 MG/DL
DEPRECATED RDW RBC AUTO: 54 FL (ref 35.1–46.3)
EOSINOPHIL # BLD AUTO: 0.25 X10(3) UL (ref 0–0.7)
EOSINOPHIL NFR BLD AUTO: 3.1 %
ERYTHROCYTE [DISTWIDTH] IN BLOOD BY AUTOMATED COUNT: 17.3 % (ref 11–15)
FASTING STATUS PATIENT QL REPORTED: NO
GLOBULIN PLAS-MCNC: 3.5 G/DL (ref 2.8–4.4)
GLUCOSE BLD-MCNC: 105 MG/DL (ref 70–99)
HCT VFR BLD AUTO: 42.3 %
HGB BLD-MCNC: 12.6 G/DL
IMM GRANULOCYTES # BLD AUTO: 0.09 X10(3) UL (ref 0–1)
IMM GRANULOCYTES NFR BLD: 1.1 %
LYMPHOCYTES # BLD AUTO: 1.23 X10(3) UL (ref 1–4)
LYMPHOCYTES NFR BLD AUTO: 15.2 %
MCH RBC QN AUTO: 25.4 PG (ref 26–34)
MCHC RBC AUTO-ENTMCNC: 29.8 G/DL (ref 31–37)
MCV RBC AUTO: 85.3 FL
MONOCYTES # BLD AUTO: 1.15 X10(3) UL (ref 0.1–1)
MONOCYTES NFR BLD AUTO: 14.2 %
NEUTROPHILS # BLD AUTO: 5.25 X10 (3) UL (ref 1.5–7.7)
NEUTROPHILS # BLD AUTO: 5.25 X10(3) UL (ref 1.5–7.7)
NEUTROPHILS NFR BLD AUTO: 64.7 %
OSMOLALITY SERPL CALC.SUM OF ELEC: 288 MOSM/KG (ref 275–295)
PLATELET # BLD AUTO: 189 10(3)UL (ref 150–450)
POTASSIUM SERPL-SCNC: 3.6 MMOL/L (ref 3.5–5.1)
PROT SERPL-MCNC: 7.2 G/DL (ref 6.4–8.2)
RBC # BLD AUTO: 4.96 X10(6)UL
SODIUM SERPL-SCNC: 139 MMOL/L (ref 136–145)
WBC # BLD AUTO: 8.1 X10(3) UL (ref 4–11)

## 2022-04-18 PROCEDURE — 85025 COMPLETE CBC W/AUTO DIFF WBC: CPT

## 2022-04-18 PROCEDURE — 80053 COMPREHEN METABOLIC PANEL: CPT

## 2022-04-18 PROCEDURE — 36415 COLL VENOUS BLD VENIPUNCTURE: CPT

## 2022-04-19 RX ORDER — MYCOPHENOLATE MOFETIL 500 MG/1
TABLET ORAL
Qty: 150 TABLET | Refills: 1 | Status: SHIPPED | OUTPATIENT
Start: 2022-04-19

## 2022-04-26 ENCOUNTER — TELEPHONE (OUTPATIENT)
Dept: FAMILY MEDICINE CLINIC | Facility: CLINIC | Age: 62
End: 2022-04-26

## 2022-04-26 NOTE — TELEPHONE ENCOUNTER
Medical Records Request received from [employer] [facility] [employer] for records from past 5 years. Release original sent to [employer] on 4/26/22. Copy sent to scanning.

## 2022-04-27 ENCOUNTER — TELEPHONE (OUTPATIENT)
Dept: FAMILY MEDICINE CLINIC | Facility: CLINIC | Age: 62
End: 2022-04-27

## 2022-04-27 NOTE — TELEPHONE ENCOUNTER
Medical Records Request received from [facility] for records from past 5 years. Release original sent to [employer] on 4/28/22. Copy sent to scanning.

## 2022-05-12 ENCOUNTER — TELEPHONE (OUTPATIENT)
Dept: FAMILY MEDICINE CLINIC | Facility: CLINIC | Age: 62
End: 2022-05-12

## 2022-05-12 NOTE — TELEPHONE ENCOUNTER
Medical Records Request received from [employer] for records from dates not provided. Faxed back to [employer] requesting that dates be added to request as well as patient signature before it can be processed. Request sent to scanning, will wait for updated request before processing.

## 2022-06-09 ENCOUNTER — OFFICE VISIT (OUTPATIENT)
Dept: NEUROLOGY | Facility: CLINIC | Age: 62
End: 2022-06-09
Payer: COMMERCIAL

## 2022-06-09 VITALS
BODY MASS INDEX: 34.27 KG/M2 | HEIGHT: 72 IN | WEIGHT: 253 LBS | HEART RATE: 88 BPM | DIASTOLIC BLOOD PRESSURE: 86 MMHG | RESPIRATION RATE: 16 BRPM | SYSTOLIC BLOOD PRESSURE: 142 MMHG

## 2022-06-09 DIAGNOSIS — R29.898 WEAKNESS OF BOTH LOWER EXTREMITIES: ICD-10-CM

## 2022-06-09 DIAGNOSIS — G70.00 MYASTHENIA GRAVIS (HCC): Primary | ICD-10-CM

## 2022-06-09 DIAGNOSIS — D50.8 OTHER IRON DEFICIENCY ANEMIA: ICD-10-CM

## 2022-06-09 PROBLEM — D70.9 NEUTROPENIA, UNSPECIFIED (HCC): Status: ACTIVE | Noted: 2022-06-09

## 2022-06-09 PROBLEM — D70.9 NEUTROPENIA, UNSPECIFIED: Status: ACTIVE | Noted: 2022-06-09

## 2022-06-09 PROCEDURE — 3008F BODY MASS INDEX DOCD: CPT | Performed by: OTHER

## 2022-06-09 PROCEDURE — 99214 OFFICE O/P EST MOD 30 MIN: CPT | Performed by: OTHER

## 2022-06-09 PROCEDURE — 3077F SYST BP >= 140 MM HG: CPT | Performed by: OTHER

## 2022-06-09 PROCEDURE — 3079F DIAST BP 80-89 MM HG: CPT | Performed by: OTHER

## 2022-06-09 RX ORDER — LORAZEPAM 0.5 MG/1
TABLET ORAL
Qty: 2 TABLET | Refills: 0 | Status: SHIPPED | OUTPATIENT
Start: 2022-06-09

## 2022-06-09 RX ORDER — PYRIDOSTIGMINE BROMIDE 60 MG/1
TABLET ORAL
Qty: 450 TABLET | Refills: 0 | Status: SHIPPED | OUTPATIENT
Start: 2022-06-09

## 2022-06-09 RX ORDER — MYCOPHENOLATE MOFETIL 500 MG/1
TABLET ORAL
Qty: 180 TABLET | Refills: 2 | Status: SHIPPED | OUTPATIENT
Start: 2022-06-09

## 2022-07-20 ENCOUNTER — PATIENT OUTREACH (OUTPATIENT)
Dept: FAMILY MEDICINE CLINIC | Facility: CLINIC | Age: 62
End: 2022-07-20

## 2022-07-28 ENCOUNTER — TELEPHONE (OUTPATIENT)
Dept: NEUROLOGY | Facility: CLINIC | Age: 62
End: 2022-07-28

## 2022-08-03 ENCOUNTER — APPOINTMENT (OUTPATIENT)
Dept: URBAN - METROPOLITAN AREA CLINIC 244 | Age: 62
Setting detail: DERMATOLOGY
End: 2022-08-04

## 2022-08-03 DIAGNOSIS — Z79.899 OTHER LONG TERM (CURRENT) DRUG THERAPY: ICD-10-CM

## 2022-08-03 DIAGNOSIS — L40.0 PSORIASIS VULGARIS: ICD-10-CM

## 2022-08-03 PROCEDURE — OTHER PRESCRIPTION MEDICATION MANAGEMENT: OTHER

## 2022-08-03 PROCEDURE — 99214 OFFICE O/P EST MOD 30 MIN: CPT

## 2022-08-03 PROCEDURE — OTHER PRESCRIPTION: OTHER

## 2022-08-03 PROCEDURE — OTHER COUNSELING: OTHER

## 2022-08-03 PROCEDURE — OTHER ORDER TESTS: OTHER

## 2022-08-03 RX ORDER — DESOXIMETASONE 2.5 MG/ML
SPRAY TOPICAL
Qty: 100 | Refills: 0 | Status: ERX

## 2022-08-03 RX ORDER — DESOXIMETASONE 2.5 MG/ML
SPRAY TOPICAL
Qty: 100 | Refills: 1 | Status: ERX

## 2022-08-03 ASSESSMENT — LOCATION SIMPLE DESCRIPTION DERM: LOCATION SIMPLE: LEFT ELBOW

## 2022-08-03 ASSESSMENT — LOCATION DETAILED DESCRIPTION DERM: LOCATION DETAILED: LEFT ELBOW

## 2022-08-03 ASSESSMENT — LOCATION ZONE DERM: LOCATION ZONE: ARM

## 2022-08-03 NOTE — HPI: RASH (PSORIASIS)
Do You Have A Family History Of Psoriasis?: yes
[Patient] : patient
How Severe Is Your Psoriasis?: moderate
Is This A New Presentation, Or A Follow-Up?: Follow Up Psoriasis

## 2022-08-09 ENCOUNTER — MED REC SCAN ONLY (OUTPATIENT)
Dept: FAMILY MEDICINE CLINIC | Facility: CLINIC | Age: 62
End: 2022-08-09

## 2022-08-16 ENCOUNTER — HOSPITAL ENCOUNTER (OUTPATIENT)
Dept: MRI IMAGING | Facility: HOSPITAL | Age: 62
Discharge: HOME | End: 2022-08-16
Attending: Other
Payer: COMMERCIAL

## 2022-08-16 DIAGNOSIS — R29.898 WEAKNESS OF BOTH LOWER EXTREMITIES: ICD-10-CM

## 2022-08-16 PROCEDURE — 72148 MRI LUMBAR SPINE W/O DYE: CPT | Performed by: OTHER

## 2022-08-20 ENCOUNTER — TELEPHONE (OUTPATIENT)
Dept: FAMILY MEDICINE CLINIC | Facility: CLINIC | Age: 62
End: 2022-08-20

## 2022-08-20 NOTE — TELEPHONE ENCOUNTER
On call provider received page from patient's wife (ok per HIPAA). Reporting she and her  both tested positive for COVID this morning. His symptoms include congestion and raspy voice. Concerned because he has myasthenia gravis (sees Dr. Katya Jiménez). Received COVID vaccine series + booster. Medications reviewed, health history reviewed. Discussed with pt's wife recommend starting Paxlovid given his health history and age but pt/pt's wife need to contact Dr. Nba Bravo office prior to starting medication.  Pt's wife, Berenice Gupta voiced understanding and will contact Neurologist. Update to Dr. Chel Gonzáles

## 2022-08-22 NOTE — TELEPHONE ENCOUNTER
Call to Jeniffer/spouse cell. Reaches identified VM. Per HIPAA consent, LVM requesting call back to triage nurse today for status update. Provided call back number and ofc phone hours.

## 2022-08-30 ENCOUNTER — OFFICE VISIT (OUTPATIENT)
Dept: ORTHOPEDICS CLINIC | Facility: CLINIC | Age: 62
End: 2022-08-30
Payer: COMMERCIAL

## 2022-08-30 VITALS — HEIGHT: 72 IN | BODY MASS INDEX: 34.27 KG/M2 | WEIGHT: 253 LBS

## 2022-08-30 DIAGNOSIS — M65.342 TRIGGER RING FINGER OF LEFT HAND: ICD-10-CM

## 2022-08-30 DIAGNOSIS — M18.10 ARTHRITIS OF CARPOMETACARPAL (CMC) JOINT OF THUMB: Primary | ICD-10-CM

## 2022-08-30 PROCEDURE — 99214 OFFICE O/P EST MOD 30 MIN: CPT | Performed by: ORTHOPAEDIC SURGERY

## 2022-08-30 PROCEDURE — 20600 DRAIN/INJ JOINT/BURSA W/O US: CPT | Performed by: ORTHOPAEDIC SURGERY

## 2022-08-30 PROCEDURE — 20550 NJX 1 TENDON SHEATH/LIGAMENT: CPT | Performed by: ORTHOPAEDIC SURGERY

## 2022-08-30 PROCEDURE — 3008F BODY MASS INDEX DOCD: CPT | Performed by: ORTHOPAEDIC SURGERY

## 2022-08-30 RX ORDER — TRIAMCINOLONE ACETONIDE 40 MG/ML
40 INJECTION, SUSPENSION INTRA-ARTICULAR; INTRAMUSCULAR ONCE
Status: COMPLETED | OUTPATIENT
Start: 2022-08-30 | End: 2022-08-30

## 2022-08-30 RX ADMIN — TRIAMCINOLONE ACETONIDE 40 MG: 40 INJECTION, SUSPENSION INTRA-ARTICULAR; INTRAMUSCULAR at 14:45:00

## 2022-08-30 RX ADMIN — TRIAMCINOLONE ACETONIDE 40 MG: 40 INJECTION, SUSPENSION INTRA-ARTICULAR; INTRAMUSCULAR at 13:45:00

## 2022-09-03 DIAGNOSIS — G70.00 MYASTHENIA GRAVIS (HCC): Primary | ICD-10-CM

## 2022-09-06 ENCOUNTER — OFFICE VISIT (OUTPATIENT)
Dept: ORTHOPEDICS CLINIC | Facility: CLINIC | Age: 62
End: 2022-09-06
Payer: COMMERCIAL

## 2022-09-06 VITALS — BODY MASS INDEX: 33.13 KG/M2 | HEIGHT: 73 IN | WEIGHT: 250 LBS

## 2022-09-06 DIAGNOSIS — M18.10 ARTHRITIS OF CARPOMETACARPAL (CMC) JOINT OF THUMB: Primary | ICD-10-CM

## 2022-09-06 PROCEDURE — 3008F BODY MASS INDEX DOCD: CPT | Performed by: ORTHOPAEDIC SURGERY

## 2022-09-06 PROCEDURE — 20600 DRAIN/INJ JOINT/BURSA W/O US: CPT | Performed by: ORTHOPAEDIC SURGERY

## 2022-09-06 RX ORDER — PYRIDOSTIGMINE BROMIDE 60 MG/1
TABLET ORAL
Qty: 450 TABLET | Refills: 0 | Status: SHIPPED | OUTPATIENT
Start: 2022-09-06

## 2022-09-06 RX ORDER — TRIAMCINOLONE ACETONIDE 40 MG/ML
40 INJECTION, SUSPENSION INTRA-ARTICULAR; INTRAMUSCULAR ONCE
Status: COMPLETED | OUTPATIENT
Start: 2022-09-06 | End: 2022-09-06

## 2022-09-06 RX ADMIN — TRIAMCINOLONE ACETONIDE 40 MG: 40 INJECTION, SUSPENSION INTRA-ARTICULAR; INTRAMUSCULAR at 15:04:00

## 2022-09-06 NOTE — PROGRESS NOTES
[facility] Joint Injection:    Written consent was obtained. Skin was prepped with ChloraPrep. 1.5 mL mixture of 1 mL of 40 mg of Kenalog and 1 mL of 1% lidocaine was injected into the left CMC joint. Patient tolerated the procedure. No complications were encountered. Band-Aid was applied. Olga Trevino MD  Hand, Wrist, & Elbow Surgery  [facility]  [facility] [address] [facility], [address] [place] [address]  [email or web address]  t: [id number]  f: [phone]

## 2022-09-07 DIAGNOSIS — G47.9 SLEEP DIFFICULTIES: ICD-10-CM

## 2022-09-07 RX ORDER — TEMAZEPAM 15 MG/1
CAPSULE ORAL
Qty: 30 CAPSULE | Refills: 0 | Status: SHIPPED | OUTPATIENT
Start: 2022-09-07

## 2022-09-16 ENCOUNTER — TELEPHONE (OUTPATIENT)
Dept: PHYSICAL THERAPY | Facility: HOSPITAL | Age: 62
End: 2022-09-16

## 2022-09-19 ENCOUNTER — OFFICE VISIT (OUTPATIENT)
Dept: PHYSICAL THERAPY | Age: 62
End: 2022-09-19
Attending: Other

## 2022-09-19 DIAGNOSIS — R25.2 CRAMP IN LOWER LEG: ICD-10-CM

## 2022-09-19 DIAGNOSIS — M54.16 LUMBAR RADICULOPATHY: ICD-10-CM

## 2022-09-19 PROCEDURE — 97162 PT EVAL MOD COMPLEX 30 MIN: CPT

## 2022-09-19 PROCEDURE — 97110 THERAPEUTIC EXERCISES: CPT

## 2022-09-21 ENCOUNTER — OFFICE VISIT (OUTPATIENT)
Dept: PHYSICAL THERAPY | Age: 62
End: 2022-09-21
Attending: Other

## 2022-09-21 PROCEDURE — 97112 NEUROMUSCULAR REEDUCATION: CPT

## 2022-09-21 PROCEDURE — 97110 THERAPEUTIC EXERCISES: CPT

## 2022-09-21 NOTE — PROGRESS NOTES
Diagnosis:   Cramp in lower leg (R25.2)  Lumbar radiculopathy (M54.16)     Referring Provider: Araceli Fragoso  Date of Evaluation:    9/19/2022    Precautions:  Fall Risk Next MD visit:   none scheduled  Date of Surgery: n/a   Insurance Primary/Secondary: [place] / N/A     # Auth Visits: 14 visits auth until 12/18/2022           Subjective: Patient reports that he is having some general LE soreness and pain; this is mainly a the lateral knees today. He hasn't tried the exercises at home yet. He doesn't have a lot of pain in the legs except when he has the cramping and that can come on at any time    Pain: 5/10      Objective: See treatment log        Assessment: Patient had minimal right knee pain with the NuStep this visit. Continued to focus on strengthening this visit to help promote additional lumbopelvic support to decrease nerve root irritation as well as increase his LE strength for better endurance for standing and walking. Goals:   Goals: (to be met in 14 visits)   1. Patient will be independent in a progressive HEP to help manage symptoms and achieve functional independence in 3 sessions. 2. Patient will decrease his max pain to 5/10 as needed to improve his functional independence in 3 weeks. 3. Patient will increase his low ab strength to 2-/5 as needed to improve his motor control and decrease pain with sit to stand  4. Patient will increase his DF/PF strength on the left to 4+/5 as needed to improve his push off to increase his gait endurance. 5. Patient will have a 25% increase in his range of motion into extension to improve his standing endurance for daily chores. 6. Patient will increase his glut max strength to 4/5 or greater as needed to improve his mechanics with squatting  7. Patient will demonstrate 10 lateral step downs as needed to improve his strength for stair descent.      Plan: Continue with progressive strengthening and stabilization to improve his LE endurance and also his core stability for decreased symptoms; progress with SLP on the shuttle    Date: 9/21/2022  TX#: 2/14 Date:                 TX#: 3/ Date:                 TX#: 4/ Date:                 TX#: 5/   Man       Ther ex NuStep 6 min, level 4 legs only  IVA x 3 min  BKFO 2 x 10 R/L  LTR x 15  Active HS stretch x 15 R/L  Bridges 2 x 10   Side lying clams 2 x 10 R/L  Standing hip abduction 2 x 10 R/L   Standing hip extension 2 x 10 R/L   Shuttle DLP 2 x 15 6 cord         NMR TA isometric with PPT 2 x 10, 3\"  Supine low ab marches 1 x 10   Seated sciatic nerve glides  10, R/L  Multifidus pallof punches x 15 RTB R/L       Ther act         HEP: not progressed this visit     Charges: 2 ther ex, 1 NMR       Total Timed Treatment: 42 min  Total Treatment Time: 43 min  Certification From: 3/73/2616  To:12/18/2022

## 2022-09-26 ENCOUNTER — OFFICE VISIT (OUTPATIENT)
Dept: PHYSICAL THERAPY | Age: 62
End: 2022-09-26
Attending: Other

## 2022-09-26 PROCEDURE — 97110 THERAPEUTIC EXERCISES: CPT

## 2022-09-26 PROCEDURE — 97112 NEUROMUSCULAR REEDUCATION: CPT

## 2022-09-26 NOTE — PROGRESS NOTES
Diagnosis:   Cramp in lower leg (R25.2)  Lumbar radiculopathy (M54.16)     Referring Provider: Tatyana Mtz  Date of Evaluation:    9/19/2022    Precautions:  Fall Risk Next MD visit:   none scheduled  Date of Surgery: n/a   Insurance Primary/Secondary: [place] / N/A     # Auth Visits: 14 visits auth until 12/18/2022           Subjective: Patient reports that he was a little bit sore for one day after the last session. He is not having a lot of pain today, but more fatigue in both of hte thighs today but this is his usual fatigue and tiredness that he gets in the legs. He has not noticed quite as much of hte cramping in the legs     Pain: 1/10      Objective: See treatment log         Assessment: Patient had less tightness in the back with IVA this visit. He is moderately limited with his ROM with the bridges likely due to decreased extension mobility. He does have fatigue in the hips with standing strengthening; progressed with shuttle SLP this visit to help promote increased LE strength. Need to continue with core and hip strengthening for better lumbar and LE support in weight bearing. Goals:   Goals: (to be met in 14 visits)   1. Patient will be independent in a progressive HEP to help manage symptoms and achieve functional independence in 3 sessions. 2. Patient will decrease his max pain to 5/10 as needed to improve his functional independence in 3 weeks. 3. Patient will increase his low ab strength to 2-/5 as needed to improve his motor control and decrease pain with sit to stand  4. Patient will increase his DF/PF strength on the left to 4+/5 as needed to improve his push off to increase his gait endurance. 5. Patient will have a 25% increase in his range of motion into extension to improve his standing endurance for daily chores. 6. Patient will increase his glut max strength to 4/5 or greater as needed to improve his mechanics with squatting  7.  Patient will demonstrate 10 lateral step downs as needed to improve his strength for stair descent.      Plan: Continue with progressive strengthening and stabilization to improve his LE endurance and also his core stability for decreased symptoms; progress with  Low ab level one and lateral walks in the next few sessions   Date: 9/21/2022  TX#: 2/14 Date:    9/26/2022             TX#: 3/14 Date:                 TX#: 4/ Date:                 TX#: 5/   Man       Ther ex NuStep 6 min, level 4 legs only  IVA x 3 min  BKFO 2 x 10 R/L  LTR x 15  Active HS stretch x 15 R/L  Bridges 2 x 10   Side lying clams 2 x 10 R/L  Standing hip abduction 2 x 10 R/L   Standing hip extension 2 x 10 R/L   Shuttle DLP 2 x 15 6 cord    NuStep 6 min, level 4 legs only  IVA x 3 min  BKFO 2 x 10 R/L  LTR x 15  Active HS stretch x 15 R/L  Bridges 2 x 10   Side lying clams 2 x 10 R/L  Standing hip abduction 2 x 10 R/L   Standing hip extension 2 x 10 R/L   Standing heel raises x 20  Shuttle DLP 2 x 15 6 cord   Shuttle SLP 2 x 10 R/L 4 cord      NMR TA isometric with PPT 2 x 10, 3\"  Supine low ab marches 1 x 10   Seated sciatic nerve glides  10, R/L  Multifidus Pallof punches x 15 RTB R/L  TA isometric with PPT 2 x 10, 3\"  Supine low ab marches 1 x 15   Seated sciatic nerve glides  10, R/L  Multifidus Pallof punches x 15 GTB R/L      Ther act         HEP: standing hip abduction, standing hip extension, heel raises and clamshells     Charges: 2 ther ex, 1 NMR       Total Timed Treatment: 40 min  Total Treatment Time: 41 min  Certification From: 9/97/5439  To:12/18/2022

## 2022-09-28 ENCOUNTER — OFFICE VISIT (OUTPATIENT)
Dept: PHYSICAL THERAPY | Age: 62
End: 2022-09-28
Attending: Other

## 2022-09-28 PROCEDURE — 97112 NEUROMUSCULAR REEDUCATION: CPT

## 2022-09-28 PROCEDURE — 97110 THERAPEUTIC EXERCISES: CPT

## 2022-09-28 NOTE — PROGRESS NOTES
Diagnosis:   Cramp in lower leg (R25.2)  Lumbar radiculopathy (M54.16)     Referring Provider: Shu Elizalde  Date of Evaluation:    9/19/2022    Precautions:  Fall Risk Next MD visit:   none scheduled  Date of Surgery: n/a   Insurance Primary/Secondary[place] / N/[place]     # Auth Visits: 14 visits auth until 12/18/2022           Subjective: Patient reports that he has had some shin splints in the left leg in the last couple of days, but overall he think that he has had al little bit less of the cramping symptoms. He was tired yesterday so he didn't get a chance to do the exercises. He only has a little bit of stiffness in the low back but not a lot of pain. He does have an ortho appointment next week so he is thinking about maybe getting a Cortizone injection if recommended. Pain: 1/10      Objective: See treatment log       Assessment: Patient does not have any increased symptoms with prone on elbows or extension based exercises this visit. Added the standing toe raises and progressed to eccentric heel raises to promote additional L ankle strength as that will decrease his ankle pain and subjective complaints of shin pain. Need to continue with progressive strengthening to help improve his mechanics. Goals:   Goals: (to be met in 14 visits)   1. Patient will be independent in a progressive HEP to help manage symptoms and achieve functional independence in 3 sessions. 2. Patient will decrease his max pain to 5/10 as needed to improve his functional independence in 3 weeks. 3. Patient will increase his low ab strength to 2-/5 as needed to improve his motor control and decrease pain with sit to stand  4. Patient will increase his DF/PF strength on the left to 4+/5 as needed to improve his push off to increase his gait endurance. 5. Patient will have a 25% increase in his range of motion into extension to improve his standing endurance for daily chores.   6. Patient will increase his glut max strength to 4/5 or greater as needed to improve his mechanics with squatting  7. Patient will demonstrate 10 lateral step downs as needed to improve his strength for stair descent.      Plan: Continue with progressive strengthening and stabilization to improve his LE endurance and also his core stability for decreased symptoms; progress with  Low ab level one and lateral walks in the next few sessions   Date: 9/21/2022  TX#: 2/14 Date:    9/26/2022             TX#: 3/14 Date: 9/28/2022                 TX#: 4/14 Date:                 TX#: 5/   Man       Ther ex NuStep 6 min, level 4 legs only  IVA x 3 min  BKFO 2 x 10 R/L  LTR x 15  Active HS stretch x 15 R/L  Bridges 2 x 10   Side lying clams 2 x 10 R/L  Standing hip abduction 2 x 10 R/L   Standing hip extension 2 x 10 R/L   Shuttle DLP 2 x 15 6 cord    NuStep 6 min, level 4 legs only  IVA x 3 min  BKFO 2 x 10 R/L  LTR x 15  Active HS stretch x 15 R/L  Bridges 2 x 10   Side lying clams 2 x 10 R/L  Standing hip abduction 2 x 10 R/L   Standing hip extension 2 x 10 R/L   Standing heel raises x 20  Shuttle DLP 2 x 15 6 cord   Shuttle SLP 2 x 10 R/L 4 cord  NuStep 6 min, level 4 legs only  IVA x 3 min  BKFO x15 R/L  LTR x 15  Active HS stretch x 15 R/L  Bridges 2 x 10   Side lying clams 2 x 10 R/L  Standing hip abduction 2 x 10 R/L   Standing hip extension 2 x 10 R/L   Standing eccentric heel raises x 15 on the L   Standing bilateral toe raises x 15  Shuttle DLP 2 x 15 6 cord   Shuttle SLP 2 x 10 R/L 4 cord     NMR TA isometric with PPT 2 x 10, 3\"  Supine low ab marches 1 x 10   Seated sciatic nerve glides  10, R/L  Multifidus Pallof punches x 15 RTB R/L  TA isometric with PPT 2 x 10, 3\"  Supine low ab marches 1 x 15   Seated sciatic nerve glides  10, R/L  Multifidus Pallof punches x 15 GTB R/L  TA isometric with PPT 2 x 10, 3\"  Supine low ab marches 1 x 15   Seated isometric adduction x 15, 5\"  Seated sciatic nerve glides  10, R/L  Multifidus Pallof punches x 15 GTB R/L     Ther act HEP: eccentric heel raises and toe raises    Charges: 2 ther ex, 1 NMR       Total Timed Treatment: 38 min  Total Treatment Time: 40 min  Certification From: 6/32/5846  To:12/18/2022

## 2022-09-30 DIAGNOSIS — G47.9 SLEEP DIFFICULTIES: ICD-10-CM

## 2022-10-03 ENCOUNTER — OFFICE VISIT (OUTPATIENT)
Dept: ORTHOPEDICS CLINIC | Facility: CLINIC | Age: 62
End: 2022-10-03
Payer: COMMERCIAL

## 2022-10-03 ENCOUNTER — OFFICE VISIT (OUTPATIENT)
Dept: PHYSICAL THERAPY | Age: 62
End: 2022-10-03
Attending: Other
Payer: COMMERCIAL

## 2022-10-03 DIAGNOSIS — M17.0 PRIMARY OSTEOARTHRITIS OF BOTH KNEES: Primary | ICD-10-CM

## 2022-10-03 DIAGNOSIS — M25.561 BILATERAL CHRONIC KNEE PAIN: ICD-10-CM

## 2022-10-03 DIAGNOSIS — G89.29 BILATERAL CHRONIC KNEE PAIN: ICD-10-CM

## 2022-10-03 DIAGNOSIS — M25.562 BILATERAL CHRONIC KNEE PAIN: ICD-10-CM

## 2022-10-03 PROCEDURE — 97112 NEUROMUSCULAR REEDUCATION: CPT

## 2022-10-03 PROCEDURE — 97110 THERAPEUTIC EXERCISES: CPT

## 2022-10-03 RX ORDER — TRIAMCINOLONE ACETONIDE 40 MG/ML
40 INJECTION, SUSPENSION INTRA-ARTICULAR; INTRAMUSCULAR ONCE
Status: SHIPPED | OUTPATIENT
Start: 2022-10-03

## 2022-10-03 RX ORDER — TEMAZEPAM 15 MG/1
CAPSULE ORAL
Qty: 30 CAPSULE | Refills: 0 | Status: SHIPPED | OUTPATIENT
Start: 2022-10-03

## 2022-10-03 NOTE — PROGRESS NOTES
Diagnosis:   Cramp in lower leg (R25.2)  Lumbar radiculopathy (M54.16)     Referring Provider: Pascale Barrera  Date of Evaluation:    9/19/2022    Precautions:  Fall Risk Next MD visit:   none scheduled  Date of Surgery: n/a   Insurance Primary/Secondary: [employer] [place] / N/A     # Auth Visits: 14 visits auth until 12/18/2022           Subjective: Patient reports that he is feeling tired in the legs today. He has intermittent pain in the knees. He denies any numbness/tingling in the legs. , His back is feeling ok and he will intermittently get some cramping in the left foot. Pain: 1/10      Objective: See treatment log       Assessment: Patient did have some weakness in the legs following the NuStep this visit. Continued with progression of the low ab strengthening to help improve his lumbopelvic stability in addition to distal strengthening to improve his overall mechanics in CKC. Need to continue with strengthening as needed to improve his gait endurance and decrease compensations with the low back or irritation to the nerve roots      Goals:   Goals: (to be met in 14 visits)   1. Patient will be independent in a progressive HEP to help manage symptoms and achieve functional independence in 3 sessions. 2. Patient will decrease his max pain to 5/10 as needed to improve his functional independence in 3 weeks. 3. Patient will increase his low ab strength to 2-/5 as needed to improve his motor control and decrease pain with sit to stand  4. Patient will increase his DF/PF strength on the left to 4+/5 as needed to improve his push off to increase his gait endurance. 5. Patient will have a 25% increase in his range of motion into extension to improve his standing endurance for daily chores. 6. Patient will increase his glut max strength to 4/5 or greater as needed to improve his mechanics with squatting  7. Patient will demonstrate 10 lateral step downs as needed to improve his strength for stair descent.      Plan: Continue with progressive strengthening and stabilization to improve his LE endurance and also his core stability for decreased symptoms; progress with monster walks as tolerated     Date:    9/26/2022             TX#: 3/14 Date: 9/28/2022                 TX#: 4/14 Date:  10/3/2022               TX#: 5/14   Man      Ther ex NuStep 6 min, level 4 legs only  IVA x 3 min  BKFO 2 x 10 R/L  LTR x 15  Active HS stretch x 15 R/L  Bridges 2 x 10   Side lying clams 2 x 10 R/L  Standing hip abduction 2 x 10 R/L   Standing hip extension 2 x 10 R/L   Standing heel raises x 20  Shuttle DLP 2 x 15 6 cord   Shuttle SLP 2 x 10 R/L 4 cord  NuStep 6 min, level 4 legs only  IVA x 3 min  BKFO x15 R/L  LTR x 15  Active HS stretch x 15 R/L  Bridges 2 x 10   Side lying clams 2 x 10 R/L  Standing hip abduction 2 x 10 R/L   Standing hip extension 2 x 10 R/L   Standing eccentric heel raises x 15 on the L   Standing bilateral toe raises x 15  Shuttle DLP 2 x 15 6 cord   Shuttle SLP 2 x 10 R/L 4 cord  NuStep 6 min, level 4 legs only  IVA x 3 min  BKFO x15 R/L  LTR x 15  Active HS stretch x 15 R/L  Bridges 2 x 10   Side lying clams 2 x 10 R/L  Standing hip abduction 2 x 10 R/L   Standing hip extension 2 x 10 R/L   Standing heel raises x 20  Standing eccentric heel raises x 15 on the L   Standing bilateral toe raises x 20   Lateral walks 2 laps YTB  Shuttle DLP 2 x 15 6 cord   Shuttle SLP 2 x 10 R/L 4 cord   NMR TA isometric with PPT 2 x 10, 3\"  Supine low ab marches 1 x 15   Seated sciatic nerve glides  10, R/L  Multifidus Pallof punches x 15 GTB R/L  TA isometric with PPT 2 x 10, 3\"  Supine low ab marches 1 x 15   Seated isometric adduction x 15, 5\"  Seated sciatic nerve glides  10, R/L  Multifidus Pallof punches x 15 GTB R/L  TA isometric with PPT 2 x 10, 3\"  Supine low ab marches 1 x 10  Low ab level one 1 x 10    Seated isometric adduction x 15, 5\"  Seated sciatic nerve glides  10, R/L  Multifidus Pallof punches x 15 GTB R/L    Ther act HEP: HOME EXERCISE PROGRAM [[id number]]  BRACE SUPINE MARCHING -  Repeat 10 Times, Hold 1 Second(s), Complete 1 Set, Perform 1 Times a Day  Core: Low Abs Progression, Sahrmann level 1 -  Repeat 10 Times, Complete 2 Sets, Perform 1 Times a Day    Charges: 2 ther ex, 1 NMR       Total Timed Treatment: 40 min  Total Treatment Time: 42 min  Certification From: 7/99/6892  To:12/18/2022

## 2022-10-03 NOTE — TELEPHONE ENCOUNTER
Patient is due for medication visit please schedule for November/December okay to use [facility].   Thank you

## 2022-10-05 ENCOUNTER — OFFICE VISIT (OUTPATIENT)
Dept: PHYSICAL THERAPY | Age: 62
End: 2022-10-05
Attending: Other
Payer: COMMERCIAL

## 2022-10-05 PROCEDURE — 97112 NEUROMUSCULAR REEDUCATION: CPT

## 2022-10-05 PROCEDURE — 97110 THERAPEUTIC EXERCISES: CPT

## 2022-10-05 NOTE — PROGRESS NOTES
Diagnosis:   Cramp in lower leg (R25.2)  Lumbar radiculopathy (M54.16)     Referring Provider: Gabriel Jones  Date of Evaluation:    9/19/2022    Precautions:  Fall Risk Next MD visit:   none scheduled  Date of Surgery: n/a   Insurance Primary/Secondary: [employer] [place] / N/A     # Auth Visits: 14 visits auth until 12/18/2022           Subjective: Patient reports that he did follow up with the MD for the knees the other day and he did have an injection in both of the legs. He does notice that the cramping seems to have gotten a bit better overall and he has only had some minimal cramping in the calf and less in the foot. He is feeling more tired in the legs and thighs today and this is a tightness. Pain: 0/10 - describes tightness in the legs not pain      Objective: See treatment log        Assessment:  Patient had more fatigue with progression of RTB this session with table based exercise; increase proximal hip strength will help to promote additional lumbopelvic stability and knee support in CKC. Continued with strengthening with adding some balance component in standing to help reinforce core stability; needs reinforcement. Goals:   Goals: (to be met in 14 visits)   1. Patient will be independent in a progressive HEP to help manage symptoms and achieve functional independence in 3 sessions. 2. Patient will decrease his max pain to 5/10 as needed to improve his functional independence in 3 weeks. 3. Patient will increase his low ab strength to 2-/5 as needed to improve his motor control and decrease pain with sit to stand  4. Patient will increase his DF/PF strength on the left to 4+/5 as needed to improve his push off to increase his gait endurance. 5. Patient will have a 25% increase in his range of motion into extension to improve his standing endurance for daily chores. 6. Patient will increase his glut max strength to 4/5 or greater as needed to improve his mechanics with squatting  7.  Patient will demonstrate 10 lateral step downs as needed to improve his strength for stair descent.      Plan: Continue with progressive strengthening and stabilization to improve his LE endurance and  core stability with addition of monster walks as tolerated     Date: 9/28/2022                 TX#: 4/14 Date:  10/3/2022               TX#: 5/14 Date:  10/5/2022               TX#: 6/14   Man      Ther ex NuStep 6 min, level 4 legs only  IVA x 3 min  BKFO x15 R/L  LTR x 15  Active HS stretch x 15 R/L  Bridges 2 x 10   Side lying clams 2 x 10 R/L  Standing hip abduction 2 x 10 R/L   Standing hip extension 2 x 10 R/L   Standing eccentric heel raises x 15 on the L   Standing bilateral toe raises x 15  Shuttle DLP 2 x 15 6 cord   Shuttle SLP 2 x 10 R/L 4 cord  NuStep 6 min, level 4 legs only  IVA x 3 min  BKFO x15 R/L  LTR x 15  Active HS stretch x 15 R/L  Bridges 2 x 10   Side lying clams 2 x 10 R/L  Standing hip abduction 2 x 10 R/L   Standing hip extension 2 x 10 R/L   Standing heel raises x 20  Standing eccentric heel raises x 15 on the L   Standing bilateral toe raises x 20   Lateral walks 2 laps YTB  Shuttle DLP 2 x 15 6 cord   Shuttle SLP 2 x 10 R/L 4 cord NuStep 6 min, level 4 legs only  IVA x 3 min  BKFO x15 R/L  LTR x 15  Active HS stretch x 15 R/L  Bridges 2 x 10 RTB  Side lying clams 2 x 10 R/L RTB  Standing hip abduction on Airex 2 x 10 R/L   Standing hip extension on Airex  2 x 10 R/L   Standing bilateral heel raises x 15  Standing eccentric heel raises x 15 on the L   Standing bilateral toe raises x 20   Lateral walks 2 laps RTB  Shuttle DLP 2 x 15 6 cord   Shuttle SLP 2 x 10 R/L 4 cord  Slant stretch x 1 min level 2   NMR TA isometric with PPT 2 x 10, 3\"  Supine low ab marches 1 x 15   Seated isometric adduction x 15, 5\"  Seated sciatic nerve glides  10, R/L  Multifidus Pallof punches x 15 GTB R/L  TA isometric with PPT 2 x 10, 3\"  Supine low ab marches 1 x 10  Low ab level one 1 x 10    Seated isometric adduction x 15, 5\"  Seated sciatic nerve glides  10, R/L  Multifidus Pallof punches x 15 GTB R/L  TA isometric with PPT 2 x 10, 3\"  Supine low ab marches 1 x 10  Low ab level one 1 x 10    Seated isometric adduction x 15, 5\"  Seated sciatic nerve glides  10, R/L  Multifidus Pallof punches x 15 GTB R/L    Ther act        HEP: not progressed this session    Charges: 2 ther ex, 1 NMR       Total Timed Treatment: 40 min  Total Treatment Time: 41 min  Certification From: 4/85/6851  To:12/18/2022

## 2022-10-07 ENCOUNTER — PATIENT OUTREACH (OUTPATIENT)
Dept: FAMILY MEDICINE CLINIC | Facility: CLINIC | Age: 62
End: 2022-10-07

## 2022-10-10 ENCOUNTER — APPOINTMENT (OUTPATIENT)
Dept: PHYSICAL THERAPY | Age: 62
End: 2022-10-10
Attending: Other
Payer: COMMERCIAL

## 2022-10-10 ENCOUNTER — OFFICE VISIT (OUTPATIENT)
Dept: PHYSICAL THERAPY | Age: 62
End: 2022-10-10
Attending: FAMILY MEDICINE
Payer: COMMERCIAL

## 2022-10-10 PROCEDURE — 97112 NEUROMUSCULAR REEDUCATION: CPT

## 2022-10-10 PROCEDURE — 97110 THERAPEUTIC EXERCISES: CPT

## 2022-10-10 NOTE — PROGRESS NOTES
Diagnosis:   Cramp in lower leg (R25.2)  Lumbar radiculopathy (M54.16)     Referring Provider: Mike Del Rosario  Date of Evaluation:    9/19/2022    Precautions:  Fall Risk Next MD visit:   none scheduled  Date of Surgery: n/a   Insurance Primary/Secondary: [employer] [place] / N/A     # Auth Visits: 14 visits auth until 12/18/2022           Subjective: Patient reports that he has been feeling ok and his leg still get tired throughout the day. The cramping is still improved and the left side is cramping less than it was. The low back is pain free today. Pain: 0/10 - describes tightness in the legs not pain      Objective: See treatment log        Assessment: Patient had no increased pain with ther ex this session only minimal LE fatigue. Progressed his HEP with a TB for bridges, clams and lateral walks to help promote additional strength for better lumbopelvic stability and LE muscular endurance; needs reinforcement. Goals:   Goals: (to be met in 14 visits)   1. Patient will be independent in a progressive HEP to help manage symptoms and achieve functional independence in 3 sessions. 2. Patient will decrease his max pain to 5/10 as needed to improve his functional independence in 3 weeks. 3. Patient will increase his low ab strength to 2-/5 as needed to improve his motor control and decrease pain with sit to stand  4. Patient will increase his DF/PF strength on the left to 4+/5 as needed to improve his push off to increase his gait endurance. 5. Patient will have a 25% increase in his range of motion into extension to improve his standing endurance for daily chores. 6. Patient will increase his glut max strength to 4/5 or greater as needed to improve his mechanics with squatting  7. Patient will demonstrate 10 lateral step downs as needed to improve his strength for stair descent.      Plan: Continue with progressive strengthening and stabilization to improve his LE endurance and  core stability for progressed to DC following the next session.      Date: 9/28/2022                 TX#: 4/14 Date:  10/3/2022               TX#: 5/14 Date:  10/5/2022               TX#: 6/14 Date:  10/10/2022               TX#: 7/14   Man       Ther ex NuStep 6 min, level 4 legs only  IVA x 3 min  BKFO x15 R/L  LTR x 15  Active HS stretch x 15 R/L  Bridges 2 x 10   Side lying clams 2 x 10 R/L  Standing hip abduction 2 x 10 R/L   Standing hip extension 2 x 10 R/L   Standing eccentric heel raises x 15 on the L   Standing bilateral toe raises x 15  Shuttle DLP 2 x 15 6 cord   Shuttle SLP 2 x 10 R/L 4 cord  NuStep 6 min, level 4 legs only  IVA x 3 min  BKFO x15 R/L  LTR x 15  Active HS stretch x 15 R/L  Bridges 2 x 10   Side lying clams 2 x 10 R/L  Standing hip abduction 2 x 10 R/L   Standing hip extension 2 x 10 R/L   Standing heel raises x 20  Standing eccentric heel raises x 15 on the L   Standing bilateral toe raises x 20   Lateral walks 2 laps YTB  Shuttle DLP 2 x 15 6 cord   Shuttle SLP 2 x 10 R/L 4 cord NuStep 6 min, level 4 legs only  IVA x 3 min  BKFO x15 R/L  LTR x 15  Active HS stretch x 15 R/L  Bridges 2 x 10 RTB  Side lying clams 2 x 10 R/L RTB  Standing hip abduction on Airex 2 x 10 R/L   Standing hip extension on Airex  2 x 10 R/L   Standing bilateral heel raises x 15  Standing eccentric heel raises x 15 on the L   Standing bilateral toe raises x 20   Lateral walks 2 laps RTB  Shuttle DLP 2 x 15 6 cord   Shuttle SLP 2 x 10 R/L 4 cord  Slant stretch x 1 min level 2 NuStep 6 min, level 4 legs only  IVA x 3 min  BKFO x15 R/L  LTR x 15  Active HS stretch x 15 R/L  Bridges 2 x 10 RTB  Side lying clams 2 x 10 R/L RTB  Standing hip abduction on Airex 2 x 10 R/L   Standing hip extension on Airex  2 x 10 R/L   Standing bilateral heel raises x 15 ND  Standing eccentric heel raises x 15 on the L   Standing bilateral toe raises x 20   Lateral walks 2 laps RTB  Shuttle DLP 2 x 15 6 cord   Shuttle SLP 2 x 15 R/L 4 cord  Slant stretch x 1 min level 2 NMR TA isometric with PPT 2 x 10, 3\"  Supine low ab marches 1 x 15   Seated isometric adduction x 15, 5\"  Seated sciatic nerve glides  10, R/L  Multifidus Pallof punches x 15 GTB R/L  TA isometric with PPT 2 x 10, 3\"  Supine low ab marches 1 x 10  Low ab level one 1 x 10    Seated isometric adduction x 15, 5\"  Seated sciatic nerve glides  10, R/L  Multifidus Pallof punches x 15 GTB R/L  TA isometric with PPT 2 x 10, 3\"  Supine low ab marches 1 x 10  Low ab level one 1 x 10    Seated isometric adduction x 15, 5\"  Seated sciatic nerve glides  10, R/L  Multifidus Pallof punches x 15 GTB R/L  TA isometric with PPT 2 x 10, 3\"  Supine low ab marches 1 x 10  Low ab level one 1 x 10    Seated isometric adduction x 15, 5\"  Seated sciatic nerve glides  10, R/L  Multifidus Pallof punches x 15 GTB R/L    Ther act         HEP: added TB to HEP    Charges: 2 ther ex, 1 NMR       Total Timed Treatment: 40 min  Total Treatment Time: 42 min  Certification From: 0/07/8851  To:12/18/2022

## 2022-10-11 ENCOUNTER — TELEPHONE (OUTPATIENT)
Dept: NEUROLOGY | Facility: CLINIC | Age: 62
End: 2022-10-11

## 2022-10-11 ENCOUNTER — OFFICE VISIT (OUTPATIENT)
Dept: ORTHOPEDICS CLINIC | Facility: CLINIC | Age: 62
End: 2022-10-11
Payer: COMMERCIAL

## 2022-10-11 VITALS — BODY MASS INDEX: 33.86 KG/M2 | WEIGHT: 250 LBS | HEIGHT: 72 IN

## 2022-10-11 DIAGNOSIS — M65.342 TRIGGER RING FINGER OF LEFT HAND: Primary | ICD-10-CM

## 2022-10-11 PROCEDURE — 20550 NJX 1 TENDON SHEATH/LIGAMENT: CPT | Performed by: ORTHOPAEDIC SURGERY

## 2022-10-11 PROCEDURE — 99213 OFFICE O/P EST LOW 20 MIN: CPT | Performed by: ORTHOPAEDIC SURGERY

## 2022-10-11 PROCEDURE — 3008F BODY MASS INDEX DOCD: CPT | Performed by: ORTHOPAEDIC SURGERY

## 2022-10-11 RX ORDER — TRIAMCINOLONE ACETONIDE 40 MG/ML
40 INJECTION, SUSPENSION INTRA-ARTICULAR; INTRAMUSCULAR ONCE
Status: COMPLETED | OUTPATIENT
Start: 2022-10-11 | End: 2022-10-11

## 2022-10-11 RX ADMIN — TRIAMCINOLONE ACETONIDE 40 MG: 40 INJECTION, SUSPENSION INTRA-ARTICULAR; INTRAMUSCULAR at 14:40:00

## 2022-10-12 ENCOUNTER — OFFICE VISIT (OUTPATIENT)
Dept: PHYSICAL THERAPY | Age: 62
End: 2022-10-12
Attending: Other
Payer: COMMERCIAL

## 2022-10-12 PROCEDURE — 97112 NEUROMUSCULAR REEDUCATION: CPT

## 2022-10-12 PROCEDURE — 97110 THERAPEUTIC EXERCISES: CPT

## 2022-10-12 NOTE — PROGRESS NOTES
[place]  Pt has attended 8 visits in Physical Therapy. Diagnosis:   Cramp in lower leg (R25.2)  Lumbar radiculopathy (M54.16)     Referring Provider: Joie Steen  Date of Evaluation:    9/19/2022    Precautions:  Fall Risk Next MD visit:   none scheduled  Date of Surgery: n/a   Insurance Primary/Secondary: [employer] [place] / N/A     # Auth Visits: 14 visits auth until 12/18/2022           Subjective: Patient reports that he has had minimal cramping in the left leg, but overall he is feeling better in with the cramping in the leg. His left leg is still bothering him a little bit and he does have ome increased fatigue in the legs with increased activity. He will intermittently have tightness in the legs but he denies any back pain or specific leg pain. He is having improvement with car transfers and increased endurance with standing and walking but he still gets fatigued. His knees do bother him with twisting or changing directions.  He still has restrictions wih stair descent and then also with squatting down    Current functional limitations include decreased endurance with standing, walking endurance, twisting or change direction while standing, bed mobility, descent stairs step to step, squatting down     Pain: 0/10 - denies any pain       Objective:   Observation/Posture: Patient stands with a sway back posture, flat lumbar spine, posterior pelvic tilt, bilateral femoral medial rotation, level iliac crests and symmetrical LE weight bearing  Neuro Screen: Minimally decreased S1 on the left; no other deficits to light touch noted    Lumbar AROM: (* denotes performed with pain)  Flexion: WFL  with no reversal of the lumbar curve; pain free  Extension: WFL and pain free  Sidebending: R WFL with low back pain; L WFL with low back pain *  Rotation: R WFL and pain free ; L WFL and pain free    Accessory motion: minimally decreased hip IR bilaterally  Palpation: none    Strength: (* denotes performed with pain)  LE   Hip flexion (L2): R 4+/5; L 4+/5  Hip abduction: R 4+/5; L 4+5  Hip Extension: R 4/5; L 4-/5   Hip ER: R 4+/5; L 4/5  Hip IR: R 5/5; L 4+/5  Knee Flexion: R 4/5; L 5/5   Knee extension (L3): R 4+/5; L 4+/5   DF (L4): R 5/5; L 4+/5  Great Toe Ext (L5): R 5/5, L 4+/5  PF (S1): R 5/5; L 4/5  Glut med: R 3+/5, L 3+/5  Glut max: R 3+/5, L 3+/5  Lower abdominals: 2-5       Flexibility:   LE   Hip Flexor: R short and stiff , L short and stiff   Hamstrings: R -25 (-45); L -25 (-35)  Piriformis: R stiff; L stiff        Special tests:   Slump: negative bilaterally  SLR: R negative , L positive     Gait: pt ambulates on level ground with min increased SOPHIE and bilateral hip drop. Balance: SLS R 3 sec, L 3 sec           Assessment: Patient has made gains in his LE strength and hip lumbar range of motion as needed to improve his walking endurance and mechanics with transfers. He has improved lower abdominal and hip strength to decrease compensations with the low back and irritation of the nerve roots that may be contributing to symptoms in the LE. He is independent in a progressive HEP and is appropriate to DC at this time. Goals:   Goals: (to be met in 14 visits)   1. Patient will be independent in a progressive HEP to help manage symptoms and achieve functional independence in 3 sessions. MET  2. Patient will decrease his max pain to 5/10 as needed to improve his functional independence in 3 weeks. MET  3. Patient will increase his low ab strength to 2-/5 as needed to improve his motor control and decrease pain with sit to stand MET  4. Patient will increase his DF/PF strength on the left to 4+/5 as needed to improve his push off to increase his gait endurance. WORKING TOWARD   5. Patient will have a 25% increase in his range of motion into extension to improve his standing endurance for daily chores. MET  6.  Patient will increase his glut max strength to 4/5 or greater as needed to improve his mechanics with squatting WORKING TOWARD   7. Patient will demonstrate 10 lateral step downs as needed to improve his strength for stair descent. WORKING TOWARD    FOTO: 72/56    Plan: DC with HEP. Patient was instructed to follow up with their physician should an exacerbation of symptoms arise. Patient/Family/Caregiver was advised of these findings, precautions, and treatment options and has agreed to actively participate in planning and for this course of care. Thank you for your referral. If you have any questions, please contact me at Dept: [phone]. Sincerely,  Electronically signed by therapist: Yasmin Quan PT     Physician's certification required:  No  Please co-sign or sign and return this letter via fax as soon as possible to [phone]. I certify the need for these services furnished under this plan of treatment and while under my care.     X___________________________________________________ Date____________________    Certification From: 95/19/5129  To:1/10/2023        Date:  10/3/2022               TX#: 5/14 Date:  10/5/2022               TX#: 6/14 Date:  10/10/2022               TX#: 7/14 Date:  10/12/2022               TX#: 8/14   Man       Ther ex NuStep 6 min, level 4 legs only  IVA x 3 min  BKFO x15 R/L  LTR x 15  Active HS stretch x 15 R/L  Bridges 2 x 10   Side lying clams 2 x 10 R/L  Standing hip abduction 2 x 10 R/L   Standing hip extension 2 x 10 R/L   Standing heel raises x 20  Standing eccentric heel raises x 15 on the L   Standing bilateral toe raises x 20   Lateral walks 2 laps YTB  Shuttle DLP 2 x 15 6 cord   Shuttle SLP 2 x 10 R/L 4 cord NuStep 6 min, level 4 legs only  IVA x 3 min  BKFO x15 R/L  LTR x 15  Active HS stretch x 15 R/L  Bridges 2 x 10 RTB  Side lying clams 2 x 10 R/L RTB  Standing hip abduction on Airex 2 x 10 R/L   Standing hip extension on Airex  2 x 10 R/L   Standing bilateral heel raises x 15  Standing eccentric heel raises x 15 on the L   Standing bilateral toe raises x 20   Lateral walks 2 laps RTB  Shuttle DLP 2 x 15 6 cord   Shuttle SLP 2 x 10 R/L 4 cord  Slant stretch x 1 min level 2 NuStep 6 min, level 4 legs only  IVA x 3 min  BKFO x15 R/L  LTR x 15  Active HS stretch x 15 R/L  Bridges 2 x 10 RTB  Side lying clams 2 x 10 R/L RTB  Standing hip abduction on Airex 2 x 10 R/L   Standing hip extension on Airex  2 x 10 R/L   Standing bilateral heel raises x 15 ND  Standing eccentric heel raises x 15 on the L   Standing bilateral toe raises x 20   Lateral walks 2 laps RTB  Shuttle DLP 2 x 15 6 cord   Shuttle SLP 2 x 15 R/L 4 cord  Slant stretch x 1 min level 2 NuStep 6 min, level 4 legs only  IVA x 3 min  BKFO x15 R/L  LTR x 15  Active HS stretch x 15 R/L  Bridges 2 x 10 RTB  Side lying clams 2 x 10 R/L RTB  Standing hip abduction on Airex 2 x 10 R/L   Standing hip extension on Airex  2 x 10 R/L   Standing eccentric heel raises x 15 on the L   Standing bilateral toe raises x 20   Lateral walks 2 laps RTB  Shuttle DLP 2 x 15 6 cord   Shuttle SLP 2 x 15 R/L 4 cord  Slant stretch x 1 min level 2   NMR TA isometric with PPT 2 x 10, 3\"  Supine low ab marches 1 x 10  Low ab level one 1 x 10    Seated isometric adduction x 15, 5\"  Seated sciatic nerve glides  10, R/L  Multifidus Pallof punches x 15 GTB R/L  TA isometric with PPT 2 x 10, 3\"  Supine low ab marches 1 x 10  Low ab level one 1 x 10    Seated isometric adduction x 15, 5\"  Seated sciatic nerve glides  10, R/L  Multifidus Pallof punches x 15 GTB R/L  TA isometric with PPT 2 x 10, 3\"  Supine low ab marches 1 x 10  Low ab level one 1 x 10    Seated isometric adduction x 15, 5\"  Seated sciatic nerve glides  10, R/L  Multifidus Pallof punches x 15 GTB R/L  TA isometric with PPT x15, 3\"  Supine low ab marches 1 x 10  Low ab level one 1 x 10    Seated isometric adduction x 15, 5\"  Seated sciatic nerve glides  10, R/L  Multifidus Pallof punches x 15 GTB R/L    Ther act         HEP: reviewed final HEP     Charges: 2 ther ex, 1 NMR       Total Timed Treatment: 42 min  Total Treatment Time: 43 min  Certification From: 9/86/7979  To:12/18/2022

## 2022-10-30 RX ORDER — MYCOPHENOLATE MOFETIL 500 MG/1
TABLET ORAL
Qty: 180 TABLET | Refills: 2 | Status: SHIPPED | OUTPATIENT
Start: 2022-10-30

## 2022-11-03 ENCOUNTER — TELEPHONE (OUTPATIENT)
Dept: NEUROLOGY | Facility: CLINIC | Age: 62
End: 2022-11-03

## 2022-11-03 NOTE — TELEPHONE ENCOUNTER
Pt is having CSF leak repair surgery on 11.29.22 and wants to know if Dr. Pascale Barrera thinks it is ok for him to stay on his medications: Mycophenolate mofetil and Pyridostigmine. PSR asked pt if surgeon told him to hold any meds prior to sx and he said it was never discussed. Pt requesting advice.

## 2022-11-03 NOTE — TELEPHONE ENCOUNTER
Should not hold myasthenia medications. Also, neuromuscular precautions should be undertaken by anesthesia, due to the MG. They should be aware of this when they do the pre-op evaluation, but things to consider would be: Additional monitoring in ICU as needed  Caution with benzodiazepines and opiates  Should avoid non-depolarizing neuromuscular blocking agents (ie rocuronium), and if a neuromuscular agent is needed, then it should be a short acting, depolarizing one. Try to use volatile anesthetics instead if possible.

## 2022-11-04 DIAGNOSIS — Z01.818 PRE-OP EXAMINATION: Primary | ICD-10-CM

## 2022-11-05 ENCOUNTER — LAB ENCOUNTER (OUTPATIENT)
Dept: LAB | Age: 62
End: 2022-11-05
Attending: FAMILY MEDICINE
Payer: COMMERCIAL

## 2022-11-05 DIAGNOSIS — G70.00 MYASTHENIA GRAVIS (HCC): ICD-10-CM

## 2022-11-05 DIAGNOSIS — Z01.818 PRE-OP EXAMINATION: ICD-10-CM

## 2022-11-05 LAB
ALBUMIN SERPL-MCNC: 3.3 G/DL (ref 3.4–5)
ALBUMIN/GLOB SERPL: 0.8 {RATIO} (ref 1–2)
ALP LIVER SERPL-CCNC: 81 U/L
ALT SERPL-CCNC: 40 U/L
ANION GAP SERPL CALC-SCNC: 5 MMOL/L (ref 0–18)
APTT PPP: 30.7 SECONDS (ref 23.3–35.6)
AST SERPL-CCNC: 26 U/L (ref 15–37)
BASOPHILS # BLD AUTO: 0.13 X10(3) UL (ref 0–0.2)
BASOPHILS NFR BLD AUTO: 1.6 %
BILIRUB SERPL-MCNC: 0.6 MG/DL (ref 0.1–2)
BUN BLD-MCNC: 12 MG/DL (ref 7–18)
BUN/CREAT SERPL: 13 (ref 10–20)
CALCIUM BLD-MCNC: 9.3 MG/DL (ref 8.5–10.1)
CHLORIDE SERPL-SCNC: 104 MMOL/L (ref 98–112)
CO2 SERPL-SCNC: 30 MMOL/L (ref 21–32)
CREAT BLD-MCNC: 0.92 MG/DL
DEPRECATED RDW RBC AUTO: 54.5 FL (ref 35.1–46.3)
EOSINOPHIL # BLD AUTO: 0.28 X10(3) UL (ref 0–0.7)
EOSINOPHIL NFR BLD AUTO: 3.4 %
ERYTHROCYTE [DISTWIDTH] IN BLOOD BY AUTOMATED COUNT: 17.4 % (ref 11–15)
EST. AVERAGE GLUCOSE BLD GHB EST-MCNC: 126 MG/DL (ref 68–126)
FASTING STATUS PATIENT QL REPORTED: YES
GFR SERPLBLD BASED ON 1.73 SQ M-ARVRAT: 93 ML/MIN/1.73M2 (ref 60–?)
GLOBULIN PLAS-MCNC: 3.9 G/DL (ref 2.8–4.4)
GLUCOSE BLD-MCNC: 97 MG/DL (ref 70–99)
HBA1C MFR BLD: 6 % (ref ?–5.7)
HCT VFR BLD AUTO: 44 %
HGB BLD-MCNC: 13.2 G/DL
IMM GRANULOCYTES # BLD AUTO: 0.39 X10(3) UL (ref 0–1)
IMM GRANULOCYTES NFR BLD: 4.7 %
INR BLD: 1.1 (ref 0.85–1.16)
LYMPHOCYTES # BLD AUTO: 1.33 X10(3) UL (ref 1–4)
LYMPHOCYTES NFR BLD AUTO: 15.9 %
MCH RBC QN AUTO: 26.1 PG (ref 26–34)
MCHC RBC AUTO-ENTMCNC: 30 G/DL (ref 31–37)
MCV RBC AUTO: 87 FL
MONOCYTES # BLD AUTO: 0.81 X10(3) UL (ref 0.1–1)
MONOCYTES NFR BLD AUTO: 9.7 %
NEUTROPHILS # BLD AUTO: 5.4 X10 (3) UL (ref 1.5–7.7)
NEUTROPHILS # BLD AUTO: 5.4 X10(3) UL (ref 1.5–7.7)
NEUTROPHILS NFR BLD AUTO: 64.7 %
OSMOLALITY SERPL CALC.SUM OF ELEC: 288 MOSM/KG (ref 275–295)
PLATELET # BLD AUTO: 189 10(3)UL (ref 150–450)
POTASSIUM SERPL-SCNC: 3.7 MMOL/L (ref 3.5–5.1)
PROT SERPL-MCNC: 7.2 G/DL (ref 6.4–8.2)
PROTHROMBIN TIME: 14.1 SECONDS (ref 11.6–14.8)
RBC # BLD AUTO: 5.06 X10(6)UL
SODIUM SERPL-SCNC: 139 MMOL/L (ref 136–145)
TSI SER-ACNC: 0.87 MIU/ML (ref 0.36–3.74)
WBC # BLD AUTO: 8.3 X10(3) UL (ref 4–11)

## 2022-11-05 PROCEDURE — 36415 COLL VENOUS BLD VENIPUNCTURE: CPT

## 2022-11-05 PROCEDURE — 85610 PROTHROMBIN TIME: CPT

## 2022-11-05 PROCEDURE — 80053 COMPREHEN METABOLIC PANEL: CPT

## 2022-11-05 PROCEDURE — 85025 COMPLETE CBC W/AUTO DIFF WBC: CPT

## 2022-11-05 PROCEDURE — 85730 THROMBOPLASTIN TIME PARTIAL: CPT

## 2022-11-05 PROCEDURE — 83036 HEMOGLOBIN GLYCOSYLATED A1C: CPT

## 2022-11-05 PROCEDURE — 84443 ASSAY THYROID STIM HORMONE: CPT

## 2022-11-05 PROCEDURE — 3044F HG A1C LEVEL LT 7.0%: CPT | Performed by: FAMILY MEDICINE

## 2022-11-09 ENCOUNTER — OFFICE VISIT (OUTPATIENT)
Dept: FAMILY MEDICINE CLINIC | Facility: CLINIC | Age: 62
End: 2022-11-09
Payer: COMMERCIAL

## 2022-11-09 DIAGNOSIS — R94.31 ABNORMAL FINDING ON EKG: ICD-10-CM

## 2022-11-09 DIAGNOSIS — G70.00 MYASTHENIA GRAVIS (HCC): ICD-10-CM

## 2022-11-09 DIAGNOSIS — K22.70 BARRETT'S ESOPHAGUS WITHOUT DYSPLASIA: ICD-10-CM

## 2022-11-09 DIAGNOSIS — D50.0 IRON DEFICIENCY ANEMIA DUE TO CHRONIC BLOOD LOSS: ICD-10-CM

## 2022-11-09 DIAGNOSIS — R73.9 HYPERGLYCEMIA: ICD-10-CM

## 2022-11-09 DIAGNOSIS — E78.00 HYPERCHOLESTEROLEMIA: ICD-10-CM

## 2022-11-09 DIAGNOSIS — I10 ESSENTIAL HYPERTENSION: ICD-10-CM

## 2022-11-09 DIAGNOSIS — R94.31 T WAVE INVERSION ON ELECTROCARDIOGRAM: ICD-10-CM

## 2022-11-09 DIAGNOSIS — Z01.818 PRE-OP EXAMINATION: Primary | ICD-10-CM

## 2022-11-09 DIAGNOSIS — G96.00 CEREBROSPINAL FLUID LEAK: ICD-10-CM

## 2022-11-09 PROCEDURE — 3075F SYST BP GE 130 - 139MM HG: CPT | Performed by: FAMILY MEDICINE

## 2022-11-09 PROCEDURE — 93000 ELECTROCARDIOGRAM COMPLETE: CPT | Performed by: FAMILY MEDICINE

## 2022-11-09 PROCEDURE — 3078F DIAST BP <80 MM HG: CPT | Performed by: FAMILY MEDICINE

## 2022-11-09 PROCEDURE — 3008F BODY MASS INDEX DOCD: CPT | Performed by: FAMILY MEDICINE

## 2022-11-09 PROCEDURE — 99244 OFF/OP CNSLTJ NEW/EST MOD 40: CPT | Performed by: FAMILY MEDICINE

## 2022-11-09 NOTE — PATIENT INSTRUCTIONS
Increase lisinopril to 40 mg daily. Call [phone] to schedule stress test.  Continue current meds. Follow recommendations regarding your medication for Myasthenia  Gravis from neurologist.  Keep good hydration. Stop turmeric and omega-3  for 2 weeks prior to surgery. DO NOT take Aspirin, Advil, Ibuprofen, Aleve , Motrin for 1 week prior surgery. Take Tylenol  as needed for pain.

## 2022-11-11 ENCOUNTER — TELEPHONE (OUTPATIENT)
Dept: FAMILY MEDICINE CLINIC | Facility: CLINIC | Age: 62
End: 2022-11-11

## 2022-11-11 NOTE — TELEPHONE ENCOUNTER
Pt called to request a new order for a stress test so that he could possibly get it done sooner. Pt is scheduled for Friday 11/18 and is looking to get it moved up if possible. Pt wants it faxed to [facility] so it could be changed.        Please advise

## 2022-11-11 NOTE — TELEPHONE ENCOUNTER
Call to pt-asking if he could get a new order for stress test to be done sooner than 11/18/22 and at [facility] instead of [facility], asked if he checked w lamin to see if there are appts available sooner than 11/18. Also discussed emg central referral process-appears current referral order is pending. Pt sts he has not-sts prefers to just leave stress test as scheduled for [facility]. He places spousejoan-on hipaa-on the call-sts she is a nurse but not cardiac, asks how soon they will have results from stress test.  Advised once test completed, goes into queue to be read by cardiologist-may take several days but we will notify pt as soon as dr Cammie Ennis receives results. Pt and spouse voice understanding/agree with plan/no further questions.

## 2022-11-16 ENCOUNTER — OFFICE VISIT (OUTPATIENT)
Dept: FAMILY MEDICINE CLINIC | Facility: CLINIC | Age: 62
End: 2022-11-16
Payer: COMMERCIAL

## 2022-11-16 VITALS
TEMPERATURE: 98 F | DIASTOLIC BLOOD PRESSURE: 75 MMHG | SYSTOLIC BLOOD PRESSURE: 139 MMHG | HEART RATE: 90 BPM | BODY MASS INDEX: 33.59 KG/M2 | RESPIRATION RATE: 14 BRPM | HEIGHT: 72 IN | WEIGHT: 248 LBS

## 2022-11-16 VITALS
BODY MASS INDEX: 33.72 KG/M2 | HEIGHT: 72 IN | WEIGHT: 249 LBS | RESPIRATION RATE: 18 BRPM | DIASTOLIC BLOOD PRESSURE: 75 MMHG | SYSTOLIC BLOOD PRESSURE: 139 MMHG | TEMPERATURE: 98 F | HEART RATE: 84 BPM

## 2022-11-16 DIAGNOSIS — I10 ESSENTIAL HYPERTENSION: Primary | ICD-10-CM

## 2022-11-16 PROCEDURE — 3078F DIAST BP <80 MM HG: CPT | Performed by: FAMILY MEDICINE

## 2022-11-16 PROCEDURE — 3008F BODY MASS INDEX DOCD: CPT | Performed by: FAMILY MEDICINE

## 2022-11-16 PROCEDURE — 99213 OFFICE O/P EST LOW 20 MIN: CPT | Performed by: FAMILY MEDICINE

## 2022-11-16 PROCEDURE — 3075F SYST BP GE 130 - 139MM HG: CPT | Performed by: FAMILY MEDICINE

## 2022-11-17 NOTE — PATIENT INSTRUCTIONS
Continue lisinopril 40 mg daily. Monitor blood pressure at home low-salt diet. Continue working on weight loss. Good luck with surgery!

## 2022-11-18 ENCOUNTER — HOSPITAL ENCOUNTER (OUTPATIENT)
Dept: CV DIAGNOSTICS | Facility: HOSPITAL | Age: 62
Discharge: HOME | End: 2022-11-18
Attending: FAMILY MEDICINE
Payer: COMMERCIAL

## 2022-11-18 DIAGNOSIS — G70.00 MYASTHENIA GRAVIS (HCC): ICD-10-CM

## 2022-11-18 DIAGNOSIS — R94.31 T WAVE INVERSION ON ELECTROCARDIOGRAM: ICD-10-CM

## 2022-11-18 DIAGNOSIS — I10 ESSENTIAL HYPERTENSION: ICD-10-CM

## 2022-11-18 DIAGNOSIS — R94.31 ABNORMAL FINDING ON EKG: ICD-10-CM

## 2022-11-18 DIAGNOSIS — G96.00 CEREBROSPINAL FLUID LEAK: ICD-10-CM

## 2022-11-18 DIAGNOSIS — E78.00 HYPERCHOLESTEROLEMIA: ICD-10-CM

## 2022-11-18 PROCEDURE — 78452 HT MUSCLE IMAGE SPECT MULT: CPT | Performed by: FAMILY MEDICINE

## 2022-11-18 PROCEDURE — 93018 CV STRESS TEST I&R ONLY: CPT | Performed by: FAMILY MEDICINE

## 2022-11-18 PROCEDURE — 93017 CV STRESS TEST TRACING ONLY: CPT | Performed by: FAMILY MEDICINE

## 2022-11-21 ENCOUNTER — TELEPHONE (OUTPATIENT)
Dept: NEUROLOGY | Facility: CLINIC | Age: 62
End: 2022-11-21

## 2022-11-21 ENCOUNTER — TELEPHONE (OUTPATIENT)
Dept: FAMILY MEDICINE CLINIC | Facility: CLINIC | Age: 62
End: 2022-11-21

## 2022-11-21 NOTE — TELEPHONE ENCOUNTER
[facility] calling regarding pt upcoming surgery for csf leak repair with neurosurgery and ENT. Provider requests general neurological clearance letter with recommendations be sent via fax ([phone]). Provider advises this surgery will last approximately 3-5 hours.         Letter pended with space to add additional recommendations at provider discretion

## 2022-11-21 NOTE — TELEPHONE ENCOUNTER
Received call from [facility]  His preferred call back number (cell): [phone]    Patient will be having surgery next Tuesday 11/29-->Left CSF leak repair, with ENT and neurosurgery. 3-4 hour case     Zabrina Kennedy is calling to obtain surgical clearance from . He sts their office has not faxed us any particular surgical clearance forms--not necessary. He is not requiring any pre-op testing such as labs/ imaging, etc.  He is just asking for some sort of surgical clearance documentation from  Asking this to be faxed to:  Office Fax: [phone]    I informed him a pre-op appt was not completed with . This may be required beforehand. Zabrina Kennedy believes  is aware of surgical clearance    Zabrina Kennedy also makes note of pts family hx of malignant hypertension.

## 2022-11-23 ENCOUNTER — TELEPHONE (OUTPATIENT)
Dept: FAMILY MEDICINE CLINIC | Facility: CLINIC | Age: 62
End: 2022-11-23

## 2022-12-02 DIAGNOSIS — G70.00 MYASTHENIA GRAVIS (HCC): ICD-10-CM

## 2022-12-04 RX ORDER — PYRIDOSTIGMINE BROMIDE 60 MG/1
TABLET ORAL
Qty: 450 TABLET | Refills: 0 | Status: SHIPPED | OUTPATIENT
Start: 2022-12-04

## 2022-12-05 RX ORDER — PANTOPRAZOLE SODIUM 40 MG/1
TABLET, DELAYED RELEASE ORAL
Qty: 180 TABLET | Refills: 1 | Status: SHIPPED | OUTPATIENT
Start: 2022-12-05

## 2022-12-05 NOTE — TELEPHONE ENCOUNTER
LOV: 11/16/22 (HTN)  Last Refill: 3/22/22, #180, 1 RF  Next OV: n/a    Please authorize if acceptable. Thank you!

## 2022-12-09 ENCOUNTER — APPOINTMENT (OUTPATIENT)
Dept: URBAN - METROPOLITAN AREA CLINIC 244 | Age: 62
Setting detail: DERMATOLOGY
End: 2022-12-12

## 2022-12-09 ENCOUNTER — LAB ENCOUNTER (OUTPATIENT)
Dept: LAB | Age: 62
End: 2022-12-09
Attending: STUDENT IN AN ORGANIZED HEALTH CARE EDUCATION/TRAINING PROGRAM
Payer: COMMERCIAL

## 2022-12-09 DIAGNOSIS — Z79.899 NEED FOR PROPHYLACTIC CHEMOTHERAPY: Primary | ICD-10-CM

## 2022-12-09 DIAGNOSIS — Z79.899 OTHER LONG TERM (CURRENT) DRUG THERAPY: ICD-10-CM

## 2022-12-09 PROCEDURE — 86480 TB TEST CELL IMMUN MEASURE: CPT

## 2022-12-09 PROCEDURE — 36415 COLL VENOUS BLD VENIPUNCTURE: CPT

## 2022-12-09 PROCEDURE — OTHER ORDER TESTS: OTHER

## 2022-12-12 LAB
M TB IFN-G CD4+ T-CELLS BLD-ACNC: 0.06 IU/ML
M TB TUBERC IFN-G BLD QL: NEGATIVE
M TB TUBERC IGNF/MITOGEN IGNF CONTROL: 3.68 IU/ML
QFT TB1 AG MINUS NIL: 0 IU/ML
QFT TB2 AG MINUS NIL: 0 IU/ML

## 2022-12-15 ENCOUNTER — APPOINTMENT (OUTPATIENT)
Dept: URBAN - METROPOLITAN AREA CLINIC 244 | Age: 62
Setting detail: DERMATOLOGY
End: 2022-12-15

## 2022-12-15 DIAGNOSIS — L40.0 PSORIASIS VULGARIS: ICD-10-CM

## 2022-12-15 PROCEDURE — 99214 OFFICE O/P EST MOD 30 MIN: CPT

## 2022-12-15 PROCEDURE — OTHER LAB REPORTS REVIEWED: OTHER

## 2022-12-15 PROCEDURE — OTHER PRESCRIPTION MEDICATION MANAGEMENT: OTHER

## 2022-12-15 PROCEDURE — OTHER PRESCRIPTION: OTHER

## 2022-12-15 PROCEDURE — OTHER COUNSELING: OTHER

## 2022-12-15 RX ORDER — DESOXIMETASONE 2.5 MG/ML
SPRAY TOPICAL
Qty: 100 | Refills: 0 | Status: ACTIVE

## 2022-12-15 ASSESSMENT — LOCATION ZONE DERM
LOCATION ZONE: ARM
LOCATION ZONE: ARM

## 2022-12-15 ASSESSMENT — LOCATION SIMPLE DESCRIPTION DERM
LOCATION SIMPLE: LEFT ELBOW
LOCATION SIMPLE: LEFT ELBOW

## 2022-12-15 ASSESSMENT — LOCATION DETAILED DESCRIPTION DERM
LOCATION DETAILED: LEFT ELBOW
LOCATION DETAILED: LEFT ELBOW

## 2023-01-30 RX ORDER — LISINOPRIL 40 MG/1
40 TABLET ORAL DAILY
Qty: 90 TABLET | Refills: 0 | Status: SHIPPED | OUTPATIENT
Start: 2023-01-30

## 2023-01-30 RX ORDER — LISINOPRIL 20 MG/1
20 TABLET ORAL DAILY
Qty: 90 TABLET | Refills: 1 | OUTPATIENT
Start: 2023-01-30

## 2023-03-02 DIAGNOSIS — E11.65 TYPE 2 DIABETES MELLITUS WITH HYPERGLYCEMIA, WITHOUT LONG-TERM CURRENT USE OF INSULIN (HCC): Primary | ICD-10-CM

## 2023-03-14 ENCOUNTER — TELEPHONE (OUTPATIENT)
Dept: SURGERY | Facility: CLINIC | Age: 63
End: 2023-03-14

## 2023-03-14 DIAGNOSIS — G70.00 MYASTHENIA GRAVIS (HCC): Primary | ICD-10-CM

## 2023-03-14 RX ORDER — MYCOPHENOLATE MOFETIL 500 MG/1
TABLET ORAL
Qty: 180 TABLET | Refills: 2 | Status: SHIPPED | OUTPATIENT
Start: 2023-03-14

## 2023-03-14 NOTE — TELEPHONE ENCOUNTER
Pt scheduled appt for 6/26/23. He asked if he needs to have blood work done before the appt. He is also requesting a refill for Mycophenolate Mofetil 500 MG Oral [id number] - [place], [address] [place] [address] , [phone], [phone]. Please advise; Pt's call back number is [phone]. Endorsed to [facility].

## 2023-04-11 ENCOUNTER — OFFICE VISIT (OUTPATIENT)
Dept: ORTHOPEDICS CLINIC | Facility: CLINIC | Age: 63
End: 2023-04-11
Payer: COMMERCIAL

## 2023-04-11 VITALS — WEIGHT: 248 LBS | HEIGHT: 72 IN | BODY MASS INDEX: 33.59 KG/M2

## 2023-04-11 DIAGNOSIS — M18.10 ARTHRITIS OF CARPOMETACARPAL (CMC) JOINT OF THUMB: Primary | ICD-10-CM

## 2023-04-11 PROCEDURE — 20600 DRAIN/INJ JOINT/BURSA W/O US: CPT | Performed by: ORTHOPAEDIC SURGERY

## 2023-04-11 PROCEDURE — 3008F BODY MASS INDEX DOCD: CPT | Performed by: ORTHOPAEDIC SURGERY

## 2023-04-11 PROCEDURE — 99213 OFFICE O/P EST LOW 20 MIN: CPT | Performed by: ORTHOPAEDIC SURGERY

## 2023-04-11 RX ORDER — BETAMETHASONE SODIUM PHOSPHATE AND BETAMETHASONE ACETATE 3; 3 MG/ML; MG/ML
6 INJECTION, SUSPENSION INTRA-ARTICULAR; INTRALESIONAL; INTRAMUSCULAR; SOFT TISSUE ONCE
Status: COMPLETED | OUTPATIENT
Start: 2023-04-11 | End: 2023-04-11

## 2023-04-11 RX ADMIN — BETAMETHASONE SODIUM PHOSPHATE AND BETAMETHASONE ACETATE 6 MG: 3; 3 INJECTION, SUSPENSION INTRA-ARTICULAR; INTRALESIONAL; INTRAMUSCULAR; SOFT TISSUE at 13:18:00

## 2023-04-24 ENCOUNTER — TELEPHONE (OUTPATIENT)
Dept: ORTHOPEDICS CLINIC | Facility: CLINIC | Age: 63
End: 2023-04-24

## 2023-04-24 ENCOUNTER — OFFICE VISIT (OUTPATIENT)
Dept: ORTHOPEDICS CLINIC | Facility: CLINIC | Age: 63
End: 2023-04-24
Payer: COMMERCIAL

## 2023-04-24 VITALS — HEIGHT: 72 IN | WEIGHT: 248 LBS | BODY MASS INDEX: 33.59 KG/M2

## 2023-04-24 DIAGNOSIS — M25.562 BILATERAL CHRONIC KNEE PAIN: ICD-10-CM

## 2023-04-24 DIAGNOSIS — M17.0 PRIMARY OSTEOARTHRITIS OF BOTH KNEES: Primary | ICD-10-CM

## 2023-04-24 DIAGNOSIS — M25.561 BILATERAL CHRONIC KNEE PAIN: ICD-10-CM

## 2023-04-24 DIAGNOSIS — G89.29 BILATERAL CHRONIC KNEE PAIN: ICD-10-CM

## 2023-04-24 RX ORDER — TRIAMCINOLONE ACETONIDE 40 MG/ML
40 INJECTION, SUSPENSION INTRA-ARTICULAR; INTRAMUSCULAR ONCE
Status: SHIPPED | OUTPATIENT
Start: 2023-04-24

## 2023-04-24 NOTE — TELEPHONE ENCOUNTER
Patient would like to move forward with gel injections, last cortisone injection was today 4/24/23, patient stated gel injections were mentioned in visit with Dr. Denise Amaral today would like to start this process. Please advise if order is permitted, Thank you.

## 2023-04-27 RX ORDER — LISINOPRIL 40 MG/1
TABLET ORAL
Qty: 90 TABLET | Refills: 0 | Status: SHIPPED | OUTPATIENT
Start: 2023-04-27

## 2023-04-28 DIAGNOSIS — G47.9 SLEEP DIFFICULTIES: ICD-10-CM

## 2023-04-28 RX ORDER — TEMAZEPAM 15 MG/1
CAPSULE ORAL
Qty: 30 CAPSULE | Refills: 0 | Status: SHIPPED | OUTPATIENT
Start: 2023-04-28

## 2023-06-05 ENCOUNTER — OFFICE VISIT (OUTPATIENT)
Dept: ORTHOPEDICS CLINIC | Facility: CLINIC | Age: 63
End: 2023-06-05
Payer: COMMERCIAL

## 2023-06-05 VITALS — WEIGHT: 248 LBS | HEIGHT: 72 IN | BODY MASS INDEX: 33.59 KG/M2

## 2023-06-05 DIAGNOSIS — M25.561 BILATERAL CHRONIC KNEE PAIN: ICD-10-CM

## 2023-06-05 DIAGNOSIS — R26.89 FUNCTIONAL GAIT ABNORMALITY: ICD-10-CM

## 2023-06-05 DIAGNOSIS — G89.29 BILATERAL CHRONIC KNEE PAIN: ICD-10-CM

## 2023-06-05 DIAGNOSIS — M17.0 PRIMARY OSTEOARTHRITIS OF BOTH KNEES: Primary | ICD-10-CM

## 2023-06-05 DIAGNOSIS — M25.562 BILATERAL CHRONIC KNEE PAIN: ICD-10-CM

## 2023-06-15 ENCOUNTER — APPOINTMENT (OUTPATIENT)
Dept: URBAN - METROPOLITAN AREA CLINIC 244 | Age: 63
Setting detail: DERMATOLOGY
End: 2023-06-15

## 2023-06-15 DIAGNOSIS — L40.0 PSORIASIS VULGARIS: ICD-10-CM

## 2023-06-15 PROCEDURE — OTHER PRESCRIPTION MEDICATION MANAGEMENT: OTHER

## 2023-06-15 PROCEDURE — 99214 OFFICE O/P EST MOD 30 MIN: CPT

## 2023-06-15 PROCEDURE — OTHER ADDITIONAL NOTES: OTHER

## 2023-06-15 PROCEDURE — OTHER COUNSELING: OTHER

## 2023-06-15 PROCEDURE — OTHER ORDER TESTS: OTHER

## 2023-06-15 ASSESSMENT — BSA PSORIASIS: % BODY COVERED IN PSORIASIS: 3

## 2023-06-15 ASSESSMENT — PGA PSORIASIS: PGA PSORIASIS 2020: ALMOST CLEAR

## 2023-06-15 ASSESSMENT — ITCH NUMERIC RATING SCALE: HOW SEVERE IS YOUR ITCHING?: 1

## 2023-06-15 ASSESSMENT — LOCATION SIMPLE DESCRIPTION DERM: LOCATION SIMPLE: LEFT ELBOW

## 2023-06-15 ASSESSMENT — LOCATION DETAILED DESCRIPTION DERM: LOCATION DETAILED: LEFT ELBOW

## 2023-06-15 ASSESSMENT — LOCATION ZONE DERM: LOCATION ZONE: ARM

## 2023-06-15 NOTE — PROCEDURE: ADDITIONAL NOTES
Additional Notes: Will consider Medicare coverage at next visit
Render Risk Assessment In Note?: no
Detail Level: Simple

## 2023-06-20 ENCOUNTER — LAB ENCOUNTER (OUTPATIENT)
Dept: LAB | Age: 63
End: 2023-06-20
Attending: Other
Payer: COMMERCIAL

## 2023-06-20 DIAGNOSIS — L40.0 PSORIASIS VULGARIS: Primary | ICD-10-CM

## 2023-06-20 DIAGNOSIS — G70.00 MYASTHENIA GRAVIS (HCC): ICD-10-CM

## 2023-06-20 LAB
ALBUMIN SERPL-MCNC: 3.6 G/DL (ref 3.4–5)
ALBUMIN/GLOB SERPL: 1 {RATIO} (ref 1–2)
ALP LIVER SERPL-CCNC: 69 U/L
ALT SERPL-CCNC: 102 U/L
ANION GAP SERPL CALC-SCNC: 7 MMOL/L (ref 0–18)
AST SERPL-CCNC: 71 U/L (ref 15–37)
BASOPHILS # BLD AUTO: 0.14 X10(3) UL (ref 0–0.2)
BASOPHILS NFR BLD AUTO: 1.8 %
BILIRUB SERPL-MCNC: 0.7 MG/DL (ref 0.1–2)
BUN BLD-MCNC: 13 MG/DL (ref 7–18)
BUN/CREAT SERPL: 14.9 (ref 10–20)
CALCIUM BLD-MCNC: 9.3 MG/DL (ref 8.5–10.1)
CHLORIDE SERPL-SCNC: 106 MMOL/L (ref 98–112)
CO2 SERPL-SCNC: 27 MMOL/L (ref 21–32)
CREAT BLD-MCNC: 0.87 MG/DL
DEPRECATED RDW RBC AUTO: 49.3 FL (ref 35.1–46.3)
EOSINOPHIL # BLD AUTO: 0.31 X10(3) UL (ref 0–0.7)
EOSINOPHIL NFR BLD AUTO: 3.9 %
ERYTHROCYTE [DISTWIDTH] IN BLOOD BY AUTOMATED COUNT: 16.6 % (ref 11–15)
FASTING STATUS PATIENT QL REPORTED: NO
GFR SERPLBLD BASED ON 1.73 SQ M-ARVRAT: 96 ML/MIN/1.73M2 (ref 60–?)
GLOBULIN PLAS-MCNC: 3.6 G/DL (ref 2.8–4.4)
GLUCOSE BLD-MCNC: 118 MG/DL (ref 70–99)
HCT VFR BLD AUTO: 37.4 %
HGB BLD-MCNC: 11.2 G/DL
IMM GRANULOCYTES # BLD AUTO: 0.15 X10(3) UL (ref 0–1)
IMM GRANULOCYTES NFR BLD: 1.9 %
LYMPHOCYTES # BLD AUTO: 1.38 X10(3) UL (ref 1–4)
LYMPHOCYTES NFR BLD AUTO: 17.4 %
MCH RBC QN AUTO: 24.4 PG (ref 26–34)
MCHC RBC AUTO-ENTMCNC: 29.9 G/DL (ref 31–37)
MCV RBC AUTO: 81.5 FL
MONOCYTES # BLD AUTO: 1.06 X10(3) UL (ref 0.1–1)
MONOCYTES NFR BLD AUTO: 13.4 %
NEUTROPHILS # BLD AUTO: 4.89 X10 (3) UL (ref 1.5–7.7)
NEUTROPHILS # BLD AUTO: 4.89 X10(3) UL (ref 1.5–7.7)
NEUTROPHILS NFR BLD AUTO: 61.6 %
OSMOLALITY SERPL CALC.SUM OF ELEC: 291 MOSM/KG (ref 275–295)
PLATELET # BLD AUTO: 231 10(3)UL (ref 150–450)
POTASSIUM SERPL-SCNC: 3.8 MMOL/L (ref 3.5–5.1)
PROT SERPL-MCNC: 7.2 G/DL (ref 6.4–8.2)
RBC # BLD AUTO: 4.59 X10(6)UL
SODIUM SERPL-SCNC: 140 MMOL/L (ref 136–145)
WBC # BLD AUTO: 7.9 X10(3) UL (ref 4–11)

## 2023-06-20 PROCEDURE — 80053 COMPREHEN METABOLIC PANEL: CPT | Performed by: OTHER

## 2023-06-20 PROCEDURE — 85025 COMPLETE CBC W/AUTO DIFF WBC: CPT | Performed by: OTHER

## 2023-06-26 ENCOUNTER — MED REC SCAN ONLY (OUTPATIENT)
Dept: FAMILY MEDICINE CLINIC | Facility: CLINIC | Age: 63
End: 2023-06-26

## 2023-06-26 ENCOUNTER — OFFICE VISIT (OUTPATIENT)
Dept: NEUROLOGY | Facility: CLINIC | Age: 63
End: 2023-06-26
Payer: COMMERCIAL

## 2023-06-26 VITALS
SYSTOLIC BLOOD PRESSURE: 146 MMHG | DIASTOLIC BLOOD PRESSURE: 76 MMHG | RESPIRATION RATE: 18 BRPM | HEART RATE: 81 BPM | WEIGHT: 255.81 LBS | BODY MASS INDEX: 35 KG/M2

## 2023-06-26 DIAGNOSIS — D50.8 OTHER IRON DEFICIENCY ANEMIA: ICD-10-CM

## 2023-06-26 DIAGNOSIS — M54.16 LUMBAR RADICULOPATHY: ICD-10-CM

## 2023-06-26 DIAGNOSIS — G70.00 MYASTHENIA GRAVIS (HCC): Primary | ICD-10-CM

## 2023-06-26 DIAGNOSIS — R79.89 ELEVATED LFTS: ICD-10-CM

## 2023-06-26 PROCEDURE — 3077F SYST BP >= 140 MM HG: CPT | Performed by: OTHER

## 2023-06-26 PROCEDURE — 99214 OFFICE O/P EST MOD 30 MIN: CPT | Performed by: OTHER

## 2023-06-26 PROCEDURE — 3078F DIAST BP <80 MM HG: CPT | Performed by: OTHER

## 2023-07-17 ENCOUNTER — PATIENT MESSAGE (OUTPATIENT)
Dept: NEUROLOGY | Facility: CLINIC | Age: 63
End: 2023-07-17

## 2023-07-17 DIAGNOSIS — R74.8 ELEVATED LIVER ENZYMES: Primary | ICD-10-CM

## 2023-07-17 NOTE — TELEPHONE ENCOUNTER
Pt sent [employer] message stating he has been having tired thighs. Routed to provider. LOV - 6/26/2023  ASSESSMENT/ACTIVE PROBLEM LIST:   Myasthenia gravis (hcc)  (primary encounter diagnosis)  Other iron deficiency anemia  Lumbar radiculopathy  Elevated lfts     Discussion/Plan:  Myasthenia gravis stabilized, but has fatigable lower extremity weakness at the mid to end of the day. This may be in part related to his bilateral knee OA, going up stairs is fine. Follow up to see if further improvement after steroid knee injection that he just got, knee pain is improved  Due to mild elevated in LFT's, decrease Cellcept back to 1250mg BID. Mestinon decrease back to 60mg 4 times a day. Prior to this, for 4-5 days, stop mestinon and monitor if lower extremity fatigue changes  CMP and CBC in 4 weeks   Discussed Vyvgart and Cellcept      Lower extremity cramps- improved, EMG negative for lumbar radiculopathy.  MRI L spine shows no significant stenosis     Iron deficiency anemia- continue monitoring with PCP     Requested Prescriptions

## 2023-08-03 NOTE — TELEPHONE ENCOUNTER
Dilan Anderson,  5 minutes ago (8:32 AM)   Tatyana Leger morning Dr Kevan Trinidad I don't have any back pain just calf and leg
From: Beti Smith  To: Camilla Wang DO  Sent: 8/31/2021 7:23 PM CDT  Subject: Non-Urgent Medical Question    My leg cramps in almost any position when I lay sitting up is a little better.  I would be willing to try meds in the interim as long
Mildly to Moderately Impaired: difficulty hearing in some environments or speaker may need to increase volume or speak distinctly

## 2023-08-04 ENCOUNTER — TELEPHONE (OUTPATIENT)
Dept: NEUROLOGY | Facility: CLINIC | Age: 63
End: 2023-08-04

## 2023-08-04 RX ORDER — MYCOPHENOLATE MOFETIL 500 MG/1
TABLET ORAL
Qty: 150 TABLET | Refills: 1 | Status: SHIPPED | OUTPATIENT
Start: 2023-08-04

## 2023-08-04 NOTE — TELEPHONE ENCOUNTER
Start form started for Vyvgart. Sent via [employer] for patient to sign and send back.

## 2023-08-04 NOTE — TELEPHONE ENCOUNTER
Medication: MYCOPHENOLATE MOFETIL 500 MG Oral     Date of last refill: 03/14/23 (180/2)  [place] 71[place]31/53    Last office visit: 06/26/23  Due back to clinic per last office note:  6 months  Date next office visit scheduled:    Future Appointments   Date Time Provider Carol Harrisoni   12/18/2023  1:15 PM DO SHARONA HodgeINAPER [facility]        Last OV note recommendation:     Plan:  Myasthenia gravis stabilized, but has fatigable lower extremity weakness at the mid to end of the day. This may be in part related to his bilateral knee OA, going up stairs is fine. Follow up to see if further improvement after steroid knee injection that he just got, knee pain is improved  Due to mild elevated in LFT's, decrease Cellcept back to 1250mg BID. Mestinon decrease back to 60mg 4 times a day. Prior to this, for 4-5 days, stop mestinon and monitor if lower extremity fatigue changes  CMP and CBC in 4 weeks   Discussed Vyvgart and Cellcept      Lower extremity cramps- improved, EMG negative for lumbar radiculopathy.  MRI L spine shows no significant stenosis     Iron deficiency anemia- continue monitoring with PCP

## 2023-08-07 NOTE — TELEPHONE ENCOUNTER
Referral completed and endorsed to provider for signature. Will need to send to [facility] with face sheet, insurance cards, TE note from Dr. Tatyana Mtz, Last positive labs and Last office visit.

## 2023-08-09 NOTE — TELEPHONE ENCOUNTER
Received completed paperwork back from provider. Faxed to [facility] referral, LOV, Last lab, face sheet.

## 2023-08-16 ENCOUNTER — PATIENT OUTREACH (OUTPATIENT)
Dept: FAMILY MEDICINE CLINIC | Facility: CLINIC | Age: 63
End: 2023-08-16

## 2023-08-16 NOTE — PROGRESS NOTES
Outreached made to pt via phone as a reminder to call and schedule his diabetic eye exam and an annual physical.

## 2023-08-18 NOTE — TELEPHONE ENCOUNTER
Coco Boss from [employer] [id number] states referral is missing provider signature; please refax.

## 2023-08-23 ENCOUNTER — TELEPHONE (OUTPATIENT)
Dept: NEUROLOGY | Facility: CLINIC | Age: 63
End: 2023-08-23

## 2023-08-23 NOTE — TELEPHONE ENCOUNTER
Per chart review, Vyvgart is being infused at [facility] & they will process the PA. Caren Aver the phone number [phone] to [facility].

## 2023-08-25 ENCOUNTER — TELEPHONE (OUTPATIENT)
Dept: NEUROLOGY | Facility: CLINIC | Age: 63
End: 2023-08-25

## 2023-08-25 NOTE — TELEPHONE ENCOUNTER
Order reviewed and signed. Faxed back with confirmation received. Copy sent to scanning under [facility] Rec Scan Only encounter dated 8/25/23.     [address], and patient's first infusion is scheduled for 8/30/23 at 2pm.

## 2023-08-25 NOTE — TELEPHONE ENCOUNTER
Patient to have first Vyvgart infusion on August 30 at 2pm.  Nursing needs to contact patient after the first four weekly infusions (around 9/25/23) to assess his response to medication and complete an MG-ADL to determine when to re-dose medication. Raymond Reveal

## 2023-08-28 ENCOUNTER — TELEPHONE (OUTPATIENT)
Dept: NEUROLOGY | Facility: CLINIC | Age: 63
End: 2023-08-28

## 2023-08-28 NOTE — TELEPHONE ENCOUNTER
Called Magen Peterson to verify vyvgart prescription had been signed - everything is signed. He will contact patient to update. Pt states he received a call from [place] [phone] stating the prescription was not signed by the provider. Gave a call back #[phone], missing contact information.

## 2023-08-28 NOTE — TELEPHONE ENCOUNTER
Pt called NICOLE and is under the impression that he should be starting vyvgart prescription Please advise [id number]

## 2023-09-01 ENCOUNTER — RX ONLY (RX ONLY)
Age: 63
End: 2023-09-01

## 2023-09-01 RX ORDER — DESOXIMETASONE 2.5 MG/ML
SPRAY TOPICAL
Qty: 100 | Refills: 0 | Status: ERX | COMMUNITY
Start: 2023-09-01

## 2023-09-12 ENCOUNTER — PATIENT OUTREACH (OUTPATIENT)
Dept: FAMILY MEDICINE CLINIC | Facility: CLINIC | Age: 63
End: 2023-09-12

## 2023-09-12 ENCOUNTER — RX ONLY (RX ONLY)
Age: 63
End: 2023-09-12

## 2023-09-12 RX ORDER — DESOXIMETASONE 2.5 MG/ML
SPRAY TOPICAL
Qty: 100 | Refills: 2 | Status: ERX

## 2023-09-19 ENCOUNTER — OFFICE VISIT (OUTPATIENT)
Dept: ORTHOPEDICS CLINIC | Facility: CLINIC | Age: 63
End: 2023-09-19
Payer: COMMERCIAL

## 2023-09-19 VITALS — HEIGHT: 72 IN | BODY MASS INDEX: 33.86 KG/M2 | WEIGHT: 250 LBS

## 2023-09-19 DIAGNOSIS — M18.10 ARTHRITIS OF CARPOMETACARPAL (CMC) JOINT OF THUMB: Primary | ICD-10-CM

## 2023-09-19 RX ORDER — BETAMETHASONE SODIUM PHOSPHATE AND BETAMETHASONE ACETATE 3; 3 MG/ML; MG/ML
6 INJECTION, SUSPENSION INTRA-ARTICULAR; INTRALESIONAL; INTRAMUSCULAR; SOFT TISSUE ONCE
Status: COMPLETED | OUTPATIENT
Start: 2023-09-19 | End: 2023-09-19

## 2023-09-19 RX ADMIN — BETAMETHASONE SODIUM PHOSPHATE AND BETAMETHASONE ACETATE 6 MG: 3; 3 INJECTION, SUSPENSION INTRA-ARTICULAR; INTRALESIONAL; INTRAMUSCULAR; SOFT TISSUE at 14:40:00

## 2023-09-21 RX ORDER — PANTOPRAZOLE SODIUM 40 MG/1
40 TABLET, DELAYED RELEASE ORAL
Qty: 180 TABLET | Refills: 1 | OUTPATIENT
Start: 2023-09-21

## 2023-09-25 NOTE — TELEPHONE ENCOUNTER
Called Magen and spoke with [facility]. Gave verbal order from written directions per Dr. Vince Ambrocio. VU, readback performed. Patient will be scheduled by [facility] on/around 11/8/23.

## 2023-09-25 NOTE — TELEPHONE ENCOUNTER
MG-ADL 1 month post Vyvgart onset noted, thank you. Recommend to have 6 weeks total off before the 4 weekly Vyvgart redose.

## 2023-09-25 NOTE — TELEPHONE ENCOUNTER
Myasthenia Gravis Activities of Daily Living (MG-ADL)  This questionnaire will assess the patient's symptoms and their impact on daily-living function. None=0 Mild=1 Moderate=2 Severe=3 Score   Talking Normal          [x] Intermittent slurring or nasal speech      [] Constant slurring or nasal speech, but can be understood  [] Ptzvnjepi-dx-kzsomsruiz speech        [] 0   Chewing Normal    [] Fatigue with solid food  [x] Fatigue with soft food  [] Gastric tube    [] 1   Swallowing Normal          [x] Rare episode of choking        [] Frequent choking necessitating changes in diet  [] Gastric tube          [] 0   Breathing Normal      [] Shortness of breath with exertion  [x] Shortness of breath at rest    [] Ventilator dependence      [] 1   Impairment of ability to brush teeth or comb hair Normal        [x] Extra effort, but no rest period needed  [] Rest period needed      [] Cannot do one of these functions      [] 0   Impairment of ability to arise from a chair Normal      [] Mild, sometimes uses arms  [] Moderate, always uses arms  [x] Severe, requires assistance    [] 2   Double vision Normal    [x] Occurs, but not daily  [] Daily, but no constant  [] Constant    [] 0   Eyelid droop Normal    [] Occurs, but not daily  [] Daily, but not constant  [] Constant    [x]Left eye 3   TOTAL SCORE     7 out of 24     Patient states he is \"happy with this treatment. Notices improvement in bilateral legs/arms - has less weakness, and he is feeling better overall. \"

## 2023-10-16 ENCOUNTER — TELEPHONE (OUTPATIENT)
Dept: NEUROLOGY | Facility: CLINIC | Age: 63
End: 2023-10-16

## 2023-10-16 ENCOUNTER — OFFICE VISIT (OUTPATIENT)
Dept: ORTHOPEDICS CLINIC | Facility: CLINIC | Age: 63
End: 2023-10-16
Payer: COMMERCIAL

## 2023-10-16 ENCOUNTER — TELEPHONE (OUTPATIENT)
Dept: ORTHOPEDICS CLINIC | Facility: CLINIC | Age: 63
End: 2023-10-16

## 2023-10-16 VITALS — WEIGHT: 250 LBS | BODY MASS INDEX: 33.86 KG/M2 | HEIGHT: 72 IN

## 2023-10-16 DIAGNOSIS — M17.0 PRIMARY OSTEOARTHRITIS OF BOTH KNEES: Primary | ICD-10-CM

## 2023-10-16 RX ORDER — TRIAMCINOLONE ACETONIDE 40 MG/ML
80 INJECTION, SUSPENSION INTRA-ARTICULAR; INTRAMUSCULAR ONCE
Status: COMPLETED | OUTPATIENT
Start: 2023-10-16 | End: 2023-10-16

## 2023-10-16 RX ADMIN — TRIAMCINOLONE ACETONIDE 80 MG: 40 INJECTION, SUSPENSION INTRA-ARTICULAR; INTRAMUSCULAR at 14:11:00

## 2023-10-16 NOTE — TELEPHONE ENCOUNTER
Received update from [employer] infusion. Patient states overall doing better. Endorsed to provider for review.

## 2023-10-16 NOTE — TELEPHONE ENCOUNTER
Patient request authorization for Bilat Monovisc gel injection, last inj was 6/5 and patient had cortisone today 10/16. Please be advised.

## 2023-10-17 NOTE — TELEPHONE ENCOUNTER
Eduar Fernandes PA-C   to [facility] Clinical Pool       10/17/23 11:24 AM  Gel order signed thank you

## 2023-10-17 NOTE — TELEPHONE ENCOUNTER
Do you think we should start auth process as pt will not be able to schedule until 12.2023?     Will pend auth if so

## 2023-10-18 ENCOUNTER — TELEPHONE (OUTPATIENT)
Dept: ORTHOPEDICS CLINIC | Facility: CLINIC | Age: 63
End: 2023-10-18

## 2023-10-18 DIAGNOSIS — G70.00 MYASTHENIA GRAVIS (HCC): Primary | ICD-10-CM

## 2023-10-19 RX ORDER — MYCOPHENOLATE MOFETIL 500 MG/1
TABLET ORAL
Qty: 75 TABLET | Refills: 0 | Status: SHIPPED | OUTPATIENT
Start: 2023-10-19

## 2023-10-19 NOTE — TELEPHONE ENCOUNTER
Medication:  MYCOPHENOLATE MOFETIL 500 MG Oral Tab      Date of last refill: 08/04/2023 (#150/1)  [place]     Last office visit: 06/26/2023  Due back to clinic per last office note:  Around 12/26/2023  Date next office visit scheduled:    Future Appointments   Date Time Provider Carol Harrisoni   12/18/2023  1:15 PM DO TORRIE Peck [facility]           Last OV note recommendation:    ASSESSMENT/ACTIVE PROBLEM LIST:   Myasthenia gravis (hcc)  (primary encounter diagnosis)  Other iron deficiency anemia  Lumbar radiculopathy  Elevated lfts     Discussion/Plan:  Myasthenia gravis stabilized, but has fatigable lower extremity weakness at the mid to end of the day. This may be in part related to his bilateral knee OA, going up stairs is fine. Follow up to see if further improvement after steroid knee injection that he just got, knee pain is improved  Due to mild elevated in LFT's, decrease Cellcept back to 1250mg BID. Mestinon decrease back to 60mg 4 times a day. Prior to this, for 4-5 days, stop mestinon and monitor if lower extremity fatigue changes  CMP and CBC in 4 weeks   Discussed Vyvgart and Cellcept      Lower extremity cramps- improved, EMG negative for lumbar radiculopathy.  MRI L spine shows no significant stenosis     Iron deficiency anemia- continue monitoring with PCP

## 2023-10-19 NOTE — TELEPHONE ENCOUNTER
Will only give 2 week refill for now as patient needs to get CMP blood work ordered in 7/2023. Please inform him to do this. I will also add CBC. When goes to lab, needs to say draw both CBC and CMP, since at times they may only draw the newly ordered lab.

## 2023-10-20 NOTE — TELEPHONE ENCOUNTER
Spoke to patient regarding refill. He states he has dropped the ball on the labs but will get done. He is going on vacation but will get done before he goes. Patient advised to have both CBC and COMP completed.

## 2023-10-25 ENCOUNTER — TELEPHONE (OUTPATIENT)
Dept: NEUROLOGY | Facility: CLINIC | Age: 63
End: 2023-10-25

## 2023-10-25 DIAGNOSIS — E11.65 TYPE 2 DIABETES MELLITUS WITH HYPERGLYCEMIA, WITHOUT LONG-TERM CURRENT USE OF INSULIN (HCC): ICD-10-CM

## 2023-10-27 DIAGNOSIS — E11.65 TYPE 2 DIABETES MELLITUS WITH HYPERGLYCEMIA, WITHOUT LONG-TERM CURRENT USE OF INSULIN (HCC): ICD-10-CM

## 2023-10-27 NOTE — TELEPHONE ENCOUNTER
Pharmacy is asking for refill. METFORMIN 500 MG Oral Tab   Sig:   TAKE 1 TABLET(500 MG) BY MOUTH TWICE DAILY WITH MEALS     LOV: 11/16/2022  (BP F/U)   Last Refill: 3/2/23 #90tablet 0Refill  NOV: None scheduled. Pt is overdue for Annual physical. Last Physical (1/29/2021)  Please see pended order and sign if appropriate. Please advise if okay to use SDA to schedule patient. Thanks.

## 2023-11-02 ENCOUNTER — TELEPHONE (OUTPATIENT)
Dept: NEUROLOGY | Facility: CLINIC | Age: 63
End: 2023-11-02

## 2023-11-02 NOTE — TELEPHONE ENCOUNTER
[employer] [address] Certification and Plan of Care from [employer].  Placed in nurses bin

## 2023-11-06 RX ORDER — SECUKINUMAB 150 MG/ML
INJECTION SUBCUTANEOUS Q4WEEKS
Qty: 1 | Refills: 2 | Status: ERX

## 2023-11-14 ENCOUNTER — LAB ENCOUNTER (OUTPATIENT)
Dept: LAB | Age: 63
End: 2023-11-14
Attending: Other
Payer: COMMERCIAL

## 2023-11-14 DIAGNOSIS — R74.8 ELEVATED LIVER ENZYMES: ICD-10-CM

## 2023-11-14 DIAGNOSIS — G70.00 MYASTHENIA GRAVIS (HCC): ICD-10-CM

## 2023-11-14 LAB
ALBUMIN SERPL-MCNC: 4.6 G/DL (ref 3.2–4.8)
ALBUMIN/GLOB SERPL: 1.6 {RATIO} (ref 1–2)
ALP LIVER SERPL-CCNC: 79 U/L
ALT SERPL-CCNC: 66 U/L
ANION GAP SERPL CALC-SCNC: 6 MMOL/L (ref 0–18)
AST SERPL-CCNC: 52 U/L (ref ?–34)
BASOPHILS # BLD AUTO: 0.1 X10(3) UL (ref 0–0.2)
BASOPHILS NFR BLD AUTO: 1.3 %
BILIRUB SERPL-MCNC: 0.8 MG/DL (ref 0.2–1.1)
BUN BLD-MCNC: 11 MG/DL (ref 9–23)
BUN/CREAT SERPL: 12 (ref 10–20)
CALCIUM BLD-MCNC: 10.6 MG/DL (ref 8.7–10.4)
CHLORIDE SERPL-SCNC: 99 MMOL/L (ref 98–112)
CO2 SERPL-SCNC: 31 MMOL/L (ref 21–32)
CREAT BLD-MCNC: 0.92 MG/DL
DEPRECATED RDW RBC AUTO: 50.5 FL (ref 35.1–46.3)
EGFRCR SERPLBLD CKD-EPI 2021: 92 ML/MIN/1.73M2 (ref 60–?)
EOSINOPHIL # BLD AUTO: 0.29 X10(3) UL (ref 0–0.7)
EOSINOPHIL NFR BLD AUTO: 3.8 %
ERYTHROCYTE [DISTWIDTH] IN BLOOD BY AUTOMATED COUNT: 14.9 % (ref 11–15)
FASTING STATUS PATIENT QL REPORTED: NO
GLOBULIN PLAS-MCNC: 2.8 G/DL (ref 2.8–4.4)
GLUCOSE BLD-MCNC: 118 MG/DL (ref 70–99)
HCT VFR BLD AUTO: 43.7 %
HGB BLD-MCNC: 14.3 G/DL
IMM GRANULOCYTES # BLD AUTO: 0.13 X10(3) UL (ref 0–1)
IMM GRANULOCYTES NFR BLD: 1.7 %
LYMPHOCYTES # BLD AUTO: 1.43 X10(3) UL (ref 1–4)
LYMPHOCYTES NFR BLD AUTO: 18.9 %
MCH RBC QN AUTO: 30.2 PG (ref 26–34)
MCHC RBC AUTO-ENTMCNC: 32.7 G/DL (ref 31–37)
MCV RBC AUTO: 92.2 FL
MONOCYTES # BLD AUTO: 0.95 X10(3) UL (ref 0.1–1)
MONOCYTES NFR BLD AUTO: 12.6 %
NEUTROPHILS # BLD AUTO: 4.66 X10 (3) UL (ref 1.5–7.7)
NEUTROPHILS # BLD AUTO: 4.66 X10(3) UL (ref 1.5–7.7)
NEUTROPHILS NFR BLD AUTO: 61.7 %
OSMOLALITY SERPL CALC.SUM OF ELEC: 282 MOSM/KG (ref 275–295)
PLATELET # BLD AUTO: 203 10(3)UL (ref 150–450)
POTASSIUM SERPL-SCNC: 3.7 MMOL/L (ref 3.5–5.1)
PROT SERPL-MCNC: 7.4 G/DL (ref 5.7–8.2)
RBC # BLD AUTO: 4.74 X10(6)UL
SODIUM SERPL-SCNC: 136 MMOL/L (ref 136–145)
WBC # BLD AUTO: 7.6 X10(3) UL (ref 4–11)

## 2023-11-14 PROCEDURE — 80053 COMPREHEN METABOLIC PANEL: CPT | Performed by: OTHER

## 2023-11-14 PROCEDURE — 85025 COMPLETE CBC W/AUTO DIFF WBC: CPT | Performed by: OTHER

## 2023-11-16 ENCOUNTER — OFFICE VISIT (OUTPATIENT)
Dept: NEUROLOGY | Facility: CLINIC | Age: 63
End: 2023-11-16
Payer: COMMERCIAL

## 2023-11-16 VITALS
RESPIRATION RATE: 16 BRPM | BODY MASS INDEX: 35 KG/M2 | WEIGHT: 257 LBS | SYSTOLIC BLOOD PRESSURE: 130 MMHG | HEART RATE: 70 BPM | DIASTOLIC BLOOD PRESSURE: 70 MMHG | OXYGEN SATURATION: 98 %

## 2023-11-16 DIAGNOSIS — R74.8 ELEVATED LIVER ENZYMES: ICD-10-CM

## 2023-11-16 DIAGNOSIS — G70.00 MYASTHENIA GRAVIS (HCC): Primary | ICD-10-CM

## 2023-11-16 PROCEDURE — 99214 OFFICE O/P EST MOD 30 MIN: CPT | Performed by: OTHER

## 2023-11-16 PROCEDURE — 3075F SYST BP GE 130 - 139MM HG: CPT | Performed by: OTHER

## 2023-11-16 PROCEDURE — 3078F DIAST BP <80 MM HG: CPT | Performed by: OTHER

## 2023-11-16 RX ORDER — MYCOPHENOLATE MOFETIL 500 MG/1
TABLET ORAL
Qty: 150 TABLET | Refills: 2 | Status: SHIPPED | OUTPATIENT
Start: 2023-11-16

## 2023-11-16 RX ORDER — MOMETASONE FUROATE 50 UG/1
2 SPRAY, METERED NASAL DAILY
COMMUNITY
Start: 2023-10-23 | End: 2024-01-21

## 2023-11-16 NOTE — PROGRESS NOTES
[facility]    Neurology    Sagar Leal Patient Status:  No patient class for patient encounter    1960 MRN [id number]   Location [address] [place] PCP Iris Brown MD              HPI:   Sagar Leal is a(n) 70year old male who presents for evaluation of myasthenia gravis. He had Covid- fever and fatigue in 2020, and shortly after was diagnosed with myasthenia gravis. During Covid he was also having short term memory loss. Two days before fever started, he noticed his left eye was drooping. Four weeks later he started seeing double, and he was hospitalized for this in 2020. He had achR ab testing which was positive, and was diagnosed with myasthenia gravis. Denies limb weakness, denies dysphagia. Reports occasionally his jaw hurts at times when he is chewing, near angle of jaw. Liquids are not a problem. No am headaches. His left eye has been drooping and still has diplopia. Improved over the last 5 days after starting prednisone. He started on 10mg of prednisone on 20, then every 2 weeks he has been going up by 10mg. On 20 he started 40mg of prednisone. He has far vision in his right eye, and near vision in his left eye, which is from cataracts, and regularly sees ophtho. Works as , and currently on disability due to diplopia. He is on mestinon 60/60/30/30mg. On 60mg QID he had diarrhea. Wife reports he has always had garbled/fast speech, but now it is more garbled. It is also occasionally more raspy and quiet, especially after a lot of talking. Interim  Since last visit, one weekend he had flu like symptoms which resolved when imuran was stopped. He was still having sweating after flu like symptoms went away, and he self decreased prednisone to 40mg due to this. That helped with the sweating. Reports feeling pressure over his left frontal region which is worse with diplopia.  Walking can trigger it, as can focusing on something visually. Taking mestinon 60/60/60/30mg. His speech is less raspy. Diplopia is improved with 40mg of prednisone. Balance is a little worse, L knee is very painful. Going upstairs feels fatiguing. Walking slow helps legs not feel heavy. Saw ophtho. Interim  Since last visit, he is taking prednisone 50mg daily, and started IVIG. He is taking mestinon 6 times a day. He feels like getting up off couch is easier now. He can walk around the block but then gets tired. He cannot stand for a long time. Interim  Since last visit, he was hospitalized at [facility] for glucose in the 500's, and [place] for acute GI bleed. He had an ulcer clipped and received 3 units of blood. Reports raspy voice is new since summer when eye weakness started. When he was off mestinon last week for a few days, he developed blurry vision- 1.5 images not double. He was started on amlodipine 2 weeks ago. BP's were high with IVIG. Interim  Since last visit, reports he feels stronger. Denies diplopia. LE's still a little weak, but much better. He is on prednisone taper 5mg now. He is on metformin. He is taking iron, and having CBC monitored. He has for the past 2 weeks, right shoulder pain, with lifting arms above head, from lower neck up to humeral head. Sitting and leaning head forward can also trigger it. Interim  Since last visit, he weaned off prednisone, and has been doing better. Hgb is up to 11.2 now. Works 6.5 hours a day, and afterward his legs feel tired, has to sit down more. He is on Cellcept 500mg BID. Feels that leg strength is still getting better each month. Denies any problems with jaw or vision at all. Interim  Since last visit, reports thighs feel tired at the end of the work day. If at home, feels the leg weakness less. Turns can occasionally be hard. Stairs are better. Denies ptosis, denies diplopia. Prednisone was weaned off starting in 12/2020. Repeat endoscopy showed healed ulcer.   Interim  Since last visit, patient reports cramps in L > R calf are better, but occasionally now gets cramp in left foot. Also very rarely, has tingling in the outer left two toes. Reports overall leg weakness is better. Hgb also has been slowly coming up. His legs still get tired, but better. Interim  Since last visit, patient reports occasional fatigue in his lower extremities after a long day at work. Working 7 hours a day. Prior to MG diagnosis, he was working 9 hour days. Interim  Since last visit, if sits for a long time, still have cramping in calf. No back pain. Did not do PT. Increase in mestinon to 4 times a day has helped. Has insomnia for a year. Tolerating Cellcept 1250mg BID. Interim  Patient last seen a year ago. Was recommended to come back in 3 months. Increased Cellcept to 1500mg BID last year. Feels like LE fatigue is about the same. Thinks leg symptoms are partially due to bilateral knee OA. Interim  Since starting vyvgart, patient reports thigh fatigue and weakness are 40% better. He is getting home infusions. Has received one set of 4 infusions, and for 6 weeks later, was due 118/23.       ---  Vyvgart started 8/30/23  Prednisone started 8/17/20, 10/2020 decreased to 40mg due to sweating, 50mg on 10/9/20, off in 2/2021  Imuran started 9/2/20- stopped in late 9/2020 due to flu like reaction  IVIG start 10/22/20, second one 11/12  Cellcept started 11/2019  500mg BID, 750mg BID in 7/2021, 1000mg BID in 9/2021, 1250mg BID in 4/2022    Component      Latest Ref Rng 11/14/2023   WBC      4.0 - 11.0 x10(3) uL 7.6    RBC      3.80 - 5.80 x10(6)uL 4.74    Hemoglobin      13.0 - 17.5 g/dL 14.3    Hematocrit      39.0 - 53.0 % 43.7    MCV      80.0 - 100.0 fL 92.2    MCH      26.0 - 34.0 pg 30.2    MCHC      31.0 - 37.0 g/dL 32.7    RDW-SD      35.1 - 46.3 fL 50.5 (H)    RDW      11.0 - 15.0 % 14.9    Platelet Count      206.8 - 450.0 10(3)uL 203.0    Prelim Neutrophil Abs      1.50 - 7.70 x10 (3) uL 4.66 Component      Latest Ref Rng 11/14/2023   Immature Granulocyte %      % 1.7    Glucose      70 - 99 mg/dL 118 (H)    Sodium      136 - 145 mmol/L 136    Potassium      3.5 - 5.1 mmol/L 3.7    Chloride      98 - 112 mmol/L 99    Carbon Dioxide, Total      21.0 - 32.0 mmol/L 31.0    ANION GAP      0 - 18 mmol/L 6    BUN      9 - 23 mg/dL 11    CREATININE      0.70 - 1.30 mg/dL 0.92    BUN/CREATININE RATIO      10.0 - 20.0  12.0    CALCIUM      8.7 - 10.4 mg/dL 10.6 (H)    CALCULATED OSMOLALITY      275 - 295 mOsm/kg 282    EGFR      >=60 mL/min/1.73m2 92    ALT (SGPT)      10 - 49 U/L 66 (H)    AST (SGOT)      <=34 U/L 52 (H)    ALKALINE PHOSPHATASE      45 - 117 U/L 79    Total Bilirubin      0.2 - 1.1 mg/dL 0.8    PROTEIN, TOTAL      5.7 - 8.2 g/dL 7.4         MRI L spine 8/2022  L1-L2:  No significant disc/facet abnormality, spinal stenosis, or foraminal narrowing. L2-L3:  No significant disc/facet abnormality, spinal stenosis, or foraminal narrowing. L3-L4:  Mild diffuse disc bulge. No spinal stenosis or significant neural foraminal narrowing. Mild facet joint arthropathy. L4-L5:  Mild diffuse disc bulge with a right paracentral and lateral protrusion. Mild bilateral neural foraminal narrowing. No central canal stenosis. Mild facet joint arthropathy. Ligamentum flavum thickening narrows the lateral recesses  bilaterally. L5-S1:  Degenerative disc disease. Diffuse osteophyte disc complex with mild bilateral neural foraminal narrowing, left greater than right. No central canal stenosis. Sagittal images demonstrate normal alignment without spondylolisthesis. Vertebral body height maintained. Multilevel disc space desiccation with decreased disc height particularly at the lumbar sacral level is most consistent with degenerative disc  disease. PARASPINAL AREA:  Normal with no visible mass. BONY STRUCTURES:  No fracture, pars defect, significant scoliosis, or osseous lesion.     CORD/CAUDA EQUINA:  Normal caliber, contour, and signal intensity. Impression   CONCLUSION:    1. Degenerative change with diffuse disc bulge particularly involves the lower lumbar spine without central canal stenosis. Mild bilateral neural foraminal narrowing. Hgb 5/2022 reviewed  Component      Latest Ref Rng & Units 2/27/2022   Glucose      70 - 99 mg/dL 96   Sodium      136 - 145 mmol/L 139   Potassium      3.5 - 5.1 mmol/L 4.2   Chloride      98 - 112 mmol/L 105   Carbon Dioxide, Total      21.0 - 32.0 mmol/L 29.0   ANION GAP      0 - 18 mmol/L 5   BUN      7 - 18 mg/dL 13   CREATININE      0.70 - 1.30 mg/dL 0.82   CALCIUM      8.5 - 10.1 mg/dL 9.1   CALCULATED OSMOLALITY      275 - 295 mOsm/kg 288   eGFR NON-AFR. AMERICAN      >=60 93   eGFR       >=60 108   AST (SGOT)      15 - 37 U/L 67 (H)   ALT (SGPT)      16 - 61 U/L 94 (H)   ALKALINE PHOSPHATASE      45 - 117 U/L 89   Total Bilirubin      0.1 - 2.0 mg/dL 0.6   PROTEIN, TOTAL      6.4 - 8.2 g/dL 7.0   Albumin      3.4 - 5.0 g/dL 3.5   Globulin      2.8 - 4.4 g/dL 3.5   A/G Ratio      1.0 - 2.0 1.0   Patient Fasting for CMP?        Yes     Component      Latest Ref Rng & Units 2/27/2022   WBC      4.0 - 11.0 x10(3) uL 7.2   RBC      4.30 - 5.70 x10(6)uL 4.83   Hemoglobin      13.0 - 17.5 g/dL 11.5 (L)   Hematocrit      39.0 - 53.0 % 40.0   Platelet Count      856.2 - 450.0 10(3)uL 196.0   MCV      80.0 - 100.0 fL 82.8   MCH      26.0 - 34.0 pg 23.8 (L)   MCHC      31.0 - 37.0 g/dL 28.8 (L)   RDW      % 15.0   Prelim Neutrophil Abs      1.50 - 7.70 x10 (3) uL 4.52   Neutrophils Absolute      1.50 - 7.70 x10(3) uL 4.52   Lymphocytes Absolute      1.00 - 4.00 x10(3) uL 1.19   Monocytes Absolute      0.10 - 1.00 x10(3) uL 0.91   Eosinophils Absolute      0.00 - 0.70 x10(3) uL 0.41   Basophils Absolute      0.00 - 0.20 x10(3) uL 0.09   Immature Granulocyte Absolute      0.00 - 1.00 x10(3) uL 0.10   Neutrophils %      % 62.6 Lymphocytes %      % 16.5   Monocytes %      % 12.6   Eosinophils %      % 5.7       EMG 11/3/2021   Conclusion:  1. This study does not show electrophysiologic evidence of a left lumbar radiculopathy. Of note, a negative needle portion does not completely rule out a radiculopathy. Clinical correlation is recommended. 2. There is no evidence of a polyneuropathy, or of any mononeuropathies. Pertinent imaging and laboratory work-up:   Component      Latest Ref Rng & Units 5/5/2021   TSH      0.358 - 3.740 mIU/mL 1.320   Vitamin B12      193 - 986 pg/mL 424     CMP 7/4/21 reviewed  Component      Latest Ref Rng & Units 7/4/2021   WBC      4.0 - 11.0 x10(3) uL 6.6   RBC      4.30 - 5.70 x10(6)uL 5.16   Hemoglobin      13.0 - 17.5 g/dL 11.5 (L)   Hematocrit      39.0 - 53.0 % 39.7   MCV      80.0 - 100.0 fL 76.9 (L)   MCH      26.0 - 34.0 pg 22.3 (L)   MCHC      31.0 - 37.0 g/dL 29.0 (L)   RDW-SD      35.1 - 46.3 fL 50.6 (H)   RDW      11.0 - 15.0 % 18.7 (H)     AchR ab 7/29/20- 45  MusK antibody negative  [facility] 7/31/20 - unable to load CD- no mediastinal mass identified- visualized report, enlarged hilar lymph nodes, had covid during this  MRI brain 7/30/20- no acute intracranial abnormality.   TSH 2020- 1.4  CTA head- no stenosis or occlusion  A1c 6.6 7/29/20  Component      Latest Ref Rng & Units 4/10/2021 3/19/2021 2/25/2021   WBC      4.0 - 11.0 x10(3) uL 6.4 7.8 6.3   RBC      4.30 - 5.70 x10(6)uL 4.86 4.70 4.26 (L)   Hemoglobin      13.0 - 17.5 g/dL 11.2 (L) 10.8 (L) 9.9 (L)   Hematocrit      39.0 - 53.0 % 38.6 (L) 37.1 (L) 34.5 (L)   Platelet Count      262.2 - 450.0 10(3)uL 230.0 277.0 277.0   MCV      80.0 - 100.0 fL 79.4 (L) 78.9 (L) 81.0   MCH      26.0 - 34.0 pg 23.0 (L) 23.0 (L) 23.2 (L)   MCHC      31.0 - 37.0 g/dL 29.0 (L) 29.1 (L) 28.7 (L)   RDW      11.0 - 15.0 % 17.9 (H) 17.6 (H) 17.5 (H)   RDW-SD      35.1 - 46.3 fL 51.3 (H) 50.4 (H) 51.5 (H)     Component      Latest Ref Rng & Units 4/10/2021 2/25/2021   Glucose      70 - 99 mg/dL 105 (H) 84   Sodium      136 - 145 mmol/L 139 140   Potassium      3.5 - 5.1 mmol/L 3.7 3.5   Chloride      98 - 112 mmol/L 106 107   Carbon Dioxide, Total      21.0 - 32.0 mmol/L 27.0 28.0   ANION GAP      0 - 18 mmol/L 6 5   BUN      7 - 18 mg/dL 9 8   CREATININE      0.70 - 1.30 mg/dL 0.92 0.89   BUN/CREAT Ratio      10.0 - 20.0 9.8 (L) 9.0 (L)   CALCIUM      8.5 - 10.1 mg/dL 9.6 9.5   CALCULATED OSMOLALITY      275 - 295 mOsm/kg 287 288   eGFR NON-AFR. AMERICAN      >=60 88 91   eGFR       >=60 102 105   AST (SGOT)      15 - 37 U/L 44 (H) 54 (H)   ALT (SGPT)      16 - 61 U/L 49 61       CBC  8.1 7.8 8.7   4.9 5.1 5.4   15.5 15.5 16.8   46.4 46.8 50.0   94 92 93   31 31 31   33 33 34   168 163 183   12.0 11.4 12.6   14 14 14   0.00 0.00      7/30/20 CMP  Glucose 141        10/2017    attributed to fatty liver        Component      Latest Ref Rng & Units 12/4/2019 10/22/2010   Glucose      70 - 99 mg/dL 107 (H) 95   Sodium      136 - 145 mmol/L 139 140   Potassium      3.5 - 5.1 mmol/L 3.6 3.8   Chloride      98 - 112 mmol/L 103 103   Carbon Dioxide, Total      21.0 - 32.0 mmol/L 30.0 23   ANION GAP      0 - 18 mmol/L 6    BUN      7 - 18 mg/dL 12 13   CREATININE      0.70 - 1.30 mg/dL 0.96 0.89   BUN/CREAT Ratio      10.0 - 20.0 12.5    CALCIUM      8.5 - 10.1 mg/dL 9.2 9.1   CALCULATED OSMOLALITY      275 - 295 mOsm/kg 288    eGFR NON-AFR.  AMERICAN      >=60 85    eGFR       >=60 98    ALT (SGPT)      16 - 61 U/L 109 (H) 87 (H)   AST (SGOT)      15 - 37 U/L 77 (H) 50 (H)       Past Medical History:  Past Medical History:   Diagnosis Date    Anemia     Diabetes ([facility] [address]     from chronic steriod use    High blood pressure     Lab test positive for detection of COVID-19 virus     5/2020, subsequent testing negative    Microcytic anemia 8/27/2009    Myasthenia gravis ([facility] [address]     7/2020    OTHER DISEASES Prostate nodule 8/27/2009    Psoriasis         Past Surgical History:  Past Surgical History:   Procedure Laterality Date    CATARACT Bilateral     COLONOSCOPY  2009    normal with diverticuli    COLONOSCOPY,DIAGNOSTIC  09/09/2009    Performed by Selwyn Villanueva at [facility]    MASTOIDECTOMY,SIMPLE  11/29/2022    OTHER SURGICAL HISTORY  1978    foot    OTHER SURGICAL HISTORY  2009    prostate biopsy, normal    UPPER GI ENDOSCOPY,BIOPSY  09/09/2009    duodenal ulcer       Family History:  family history includes Diabetes in his father; brain tumors in his father; malignant hyperthermia in his maternal cousin male; uremic poisoning in his paternal grandfather. Father has psoriasis    Social History:   reports that he quit smoking about 25 years ago. His smoking use included cigarettes. He has a 20.00 pack-year smoking history. He has never used smokeless tobacco. He reports current alcohol use. He reports that he does not use drugs. Allergies:  No Known Allergies    MEDICATIONS:    Current Outpatient Medications:     mometasone furoate 50 MCG/ACT Nasal Suspension, 2 sprays by Nasal route daily. , Disp: , Rfl:     Mycophenolate Mofetil 500 MG Oral Tab, TAKE 2 AND 1/2 TABLETS BY MOUTH TWICE DAILY, Disp: 150 tablet, Rfl: 2    metFORMIN 500 MG Oral Tab, Take 1 tablet (500 mg total) by mouth 2 (two) times daily with meals.  Schedule office visit., Disp: 30 tablet, Rfl: 0    LISINOPRIL 40 MG Oral Tab, TAKE 1 TABLET(40 MG) BY MOUTH DAILY, Disp: 90 tablet, Rfl: 0    PANTOPRAZOLE 40 MG Oral Tab EC, TAKE 1 TABLET(40 MG) BY MOUTH TWICE DAILY BEFORE MEALS, Disp: 180 tablet, Rfl: 1    pyridostigmine 60 MG Oral Tab, TAKE 1 TABLET BY MOUTH FIVE TIMES DAILY, Disp: 450 tablet, Rfl: 0    ferrous sulfate 325 (65 FE) MG Oral Tab EC, Take 1 tablet (325 mg total) by mouth daily with breakfast., Disp: , Rfl: 0    COSENTYX SENSOREADY, 300 MG, 150 MG/ML Subcutaneous Solution Auto-injector, Inject 2 pen as directed every 30 (thirty) days. , Disp: , Rfl:     Ascorbic Acid (VITAMIN C OR), Take 1,000 mg by mouth daily. , Disp: , Rfl:     Acidophilus/Pectin Oral Cap, Take 1 capsule by mouth daily. , Disp: , Rfl:     omega-3 fatty acids 1000 MG Oral Cap, Take 1,000 mg by mouth daily. , Disp: , Rfl:     Turmeric 500 MG Oral Cap, Take 1 capsule by mouth every evening.  , Disp: , Rfl:     VITAMIN D 400 UNIT OR CAPS, 1 CAPSULE DAILY, Disp: , Rfl:     TEMAZEPAM 15 MG Oral Cap, TAKE 1 CAPSULE(15 MG) BY MOUTH EVERY NIGHT AS NEEDED FOR SLEEP (Patient not taking: Reported on 11/16/2023), Disp: 30 capsule, Rfl: 0      Review of Systems:   A comprehensive 10 point review of systems was completed. Pertinent positives and negatives noted in the HPI. PHYSICAL EXAM:   Neurologic Exam  Vitals  Vitals:    11/16/23 0903   BP: 130/70   Pulse: 70   Resp: 16     General Appearance: Patient is a 70year old male in no acute distress  Cardiac: Normal rate & regular rhythm  Skin: There are no rashes or other skin lesions. Musculoskeletal: There is no scoliosis, or joint deformities  Neurologic examination:  Mental status: Patient is alert, attentive, and oriented x 3. Language is coherent and fluent without aphasia. Memory, comprehension and ability to follow commands were intact. Cranial nerves II-XII: Optic discs were sharp. Pupils were round and reacted to light. Extraocular movements were full, + subjective diplopia with upgaze only. There is left eye minimal ptosis, but also present on ID picture from 1/2020, question if anatomical. There was no jaw, palate or tongue weakness or atrophy. Facial sensation was normal. Hearing was grossly intact. Shoulder shrug was normal. No dysarthria today. Neck flexion/extention 5/5  Motor exam revealed normal muscle bulk and tone. No atrophy or fasciculations. Manual muscle testing revealed MRC grade 5/5 in HF and through all muscles. Able to do 10 squats without problem.   Deep tendon reflexes were 2 at the biceps, brachioradialis, triceps, knee jerk, and ankle jerk. Plantar responses were flexor bilaterally. Sensory exam revealed normal light touch perception. Vibratory perception and proprioception were intact at the toes. Pinprick and temperature were normal. Romberg sign was absent. Complex motor skills revealed normal coordination. Finger-nose-finger intact. Gait was narrow and stable, was able to walk on heels, toes and tandem without any difficulty. Stood up and down from chair 10 times ok, but at end had some shortness of breath       ASSESSMENT/ACTIVE PROBLEM LIST:     Encounter Diagnoses   Name Primary? Myasthenia gravis ([facility] [address] Yes    Elevated liver enzymes        Discussion/Plan:  MG-ADL, proximal lower extremity fatigue improved with Vyvgart  At this time, continue Vyvgart once weekly 4 weeks on, 6 weeks off  Continue Cellcept 1250mg BID  Continue mestinon 60mg QID  Check CBC/CMP q 3 months    Elevated LFTs- possible Cellcept related, have nearly normalized on repeat blood draw with Cellcept decrease back to 1250mg BID from 1500mg BID, repeat CMP as above      Iron deficiency anemia- improved    Requested Prescriptions     Signed Prescriptions Disp Refills    Mycophenolate Mofetil 500 MG Oral Tab 150 tablet 2     Sig: TAKE 2 AND 1/2 TABLETS BY MOUTH TWICE DAILY          We discussed in depth regarding the diagnosis, prognosis, treatment. The patient was given ample opportunity to ask questions. All questions and concerns were addressed. Shemar Huynh,   Neuromuscular and General Neurology  [facility]         Myasthenia Gravis Activities of Daily Living (MG-ADL)  This questionnaire will assess the patient's symptoms and their impact on daily-living function.        None=0 Mild=1 Moderate=2 Severe=3 Score   Talking Normal          [x] Intermittent slurring or nasal speech      [] Constant slurring or nasal speech, but can be understood  [] Esntwyvzj-tf-cumzuhazwj speech        [] 0 Chewing Normal    [x] Fatigue with solid food  [] Fatigue with soft food  [] Gastric tube    [] 0   Swallowing Normal          [x] Rare episode of choking        [] Frequent choking necessitating changes in diet  [] Gastric tube          [] 0   Breathing Normal      [x] Shortness of breath with exertion  [] Shortness of breath at rest    [] Ventilator dependence      [] 0   Impairment of ability to brush teeth or comb hair Normal        [x] Extra effort, but no rest period needed  [] Rest period needed      [] Cannot do one of these functions      [] 0   Impairment of ability to arise from a chair Normal      [] Mild, sometimes uses arms  [x] Moderate, always uses arms  [] Severe, requires assistance    [] 1   Double vision Normal    [x] Occurs, but not daily  [] Daily, but no constant  [] Constant    [] 0   Eyelid droop Normal    [x] Occurs, but not daily  [] Daily, but not constant  [] Constant    [] 0   TOTAL SCORE     1 out of 24

## 2023-11-21 ENCOUNTER — TELEPHONE (OUTPATIENT)
Dept: NEUROLOGY | Facility: CLINIC | Age: 63
End: 2023-11-21

## 2023-11-27 ENCOUNTER — TELEPHONE (OUTPATIENT)
Dept: NEUROLOGY | Facility: CLINIC | Age: 63
End: 2023-11-27

## 2023-11-27 NOTE — TELEPHONE ENCOUNTER
Endorsed to provider for [facility] Acitretin Pregnancy And Lactation Text: This medication is Pregnancy Category X and should not be given to women who are pregnant or may become pregnant in the future. This medication is excreted in breast milk.

## 2023-12-07 ENCOUNTER — APPOINTMENT (OUTPATIENT)
Dept: URBAN - METROPOLITAN AREA CLINIC 244 | Age: 63
Setting detail: DERMATOLOGY
End: 2023-12-07

## 2023-12-07 ENCOUNTER — RX ONLY (RX ONLY)
Age: 63
End: 2023-12-07

## 2023-12-07 DIAGNOSIS — Z79.899 OTHER LONG TERM (CURRENT) DRUG THERAPY: ICD-10-CM

## 2023-12-07 DIAGNOSIS — L40.0 PSORIASIS VULGARIS: ICD-10-CM

## 2023-12-07 PROBLEM — L30.9 DERMATITIS, UNSPECIFIED: Status: ACTIVE | Noted: 2023-12-07

## 2023-12-07 PROCEDURE — OTHER PRESCRIPTION MEDICATION MANAGEMENT: OTHER

## 2023-12-07 PROCEDURE — 99214 OFFICE O/P EST MOD 30 MIN: CPT

## 2023-12-07 PROCEDURE — OTHER COUNSELING: OTHER

## 2023-12-07 PROCEDURE — OTHER ORDER TESTS: OTHER

## 2023-12-07 RX ORDER — SECUKINUMAB 150 MG/ML
INJECTION SUBCUTANEOUS Q4WEEKS
Qty: 1 | Refills: 12 | Status: ERX | COMMUNITY
Start: 2023-12-07

## 2023-12-07 ASSESSMENT — LOCATION DETAILED DESCRIPTION DERM
LOCATION DETAILED: LEFT ELBOW
LOCATION DETAILED: RIGHT PROXIMAL DORSAL FOREARM
LOCATION DETAILED: RIGHT POSTERIOR ANKLE
LOCATION DETAILED: LEFT SUPERIOR LATERAL LOWER BACK

## 2023-12-07 ASSESSMENT — LOCATION SIMPLE DESCRIPTION DERM
LOCATION SIMPLE: RIGHT FOREARM
LOCATION SIMPLE: LEFT LOWER BACK
LOCATION SIMPLE: RIGHT ANKLE
LOCATION SIMPLE: LEFT ELBOW

## 2023-12-07 ASSESSMENT — LOCATION ZONE DERM
LOCATION ZONE: ARM
LOCATION ZONE: LEG
LOCATION ZONE: TRUNK

## 2023-12-07 ASSESSMENT — BSA PSORIASIS: % BODY COVERED IN PSORIASIS: 3

## 2023-12-07 NOTE — PROCEDURE: ORDER TESTS
Performing Laboratory: [id number]
Bill For Surgical Tray: no
Billing Type: Third-Party Bill
Expected Date Of Service: 12/07/2023

## 2023-12-07 NOTE — PROCEDURE: PRESCRIPTION MEDICATION MANAGEMENT
Plan: Extensively discussed drug interactions and potentially trying a different biologic such as taltz or skyrizi.\\n\\nPt is concerned what medication will be covered with Medicare.  \\n\\nPt is not able to do Humira due to medical condition and current medications.\\n\\nMedications to consider if pt is not happy with current status in few mo. \\nSkyriwill or Taltz\\n\\nLabs reviewed.
Detail Level: Zone
Continue Regimen: Cosentyx and Topicort
Render In Strict Bullet Format?: No

## 2023-12-11 ENCOUNTER — MED REC SCAN ONLY (OUTPATIENT)
Dept: FAMILY MEDICINE CLINIC | Facility: CLINIC | Age: 63
End: 2023-12-11

## 2023-12-12 ENCOUNTER — TELEPHONE (OUTPATIENT)
Dept: NEUROLOGY | Facility: CLINIC | Age: 63
End: 2023-12-12

## 2023-12-12 NOTE — TELEPHONE ENCOUNTER
Rcvd progress notes from [facility] entered on 12/12/2023;placed in nursing bin for  and review.

## 2023-12-12 NOTE — TELEPHONE ENCOUNTER
Received [facility] progress notes. Patient is currently positive for Covid. Infusion was due on 12/12/2023. Patient is requesting to do it on 12/14/23. Catrachita Scarce is asking how long to wait to restart.

## 2023-12-12 NOTE — TELEPHONE ENCOUNTER
If asymptomatic, would wait one week. If symptomatic, would wait until symptom free, or if with significant improvement/minimal symptoms.

## 2023-12-13 NOTE — TELEPHONE ENCOUNTER
Patient advised he will have to wait at least until next week. Patient does have symptoms but states today is better.

## 2023-12-14 NOTE — TELEPHONE ENCOUNTER
Received call from Joe Delarosa to call patient. RN called patient and informed him that infusion can take place two weeks post covid and pt must be symptom free. Pt VU.     Lenny Quintana RN faxed above information from Dr. Lindsay Moses to [facility] with confirmation ,.

## 2023-12-18 ENCOUNTER — LAB ENCOUNTER (OUTPATIENT)
Dept: LAB | Age: 63
End: 2023-12-18
Attending: STUDENT IN AN ORGANIZED HEALTH CARE EDUCATION/TRAINING PROGRAM
Payer: COMMERCIAL

## 2023-12-18 ENCOUNTER — OFFICE VISIT (OUTPATIENT)
Dept: ORTHOPEDICS CLINIC | Facility: CLINIC | Age: 63
End: 2023-12-18
Payer: COMMERCIAL

## 2023-12-18 VITALS — BODY MASS INDEX: 34.81 KG/M2 | HEIGHT: 72 IN | WEIGHT: 257 LBS

## 2023-12-18 DIAGNOSIS — M17.0 PRIMARY OSTEOARTHRITIS OF BOTH KNEES: Primary | ICD-10-CM

## 2023-12-18 DIAGNOSIS — Z79.899 NEED FOR PROPHYLACTIC CHEMOTHERAPY: Primary | ICD-10-CM

## 2023-12-18 PROCEDURE — 86480 TB TEST CELL IMMUN MEASURE: CPT

## 2023-12-18 PROCEDURE — 36415 COLL VENOUS BLD VENIPUNCTURE: CPT

## 2023-12-18 RX ORDER — EFGARTIGIMOD ALFA 20 MG/ML
INJECTION INTRAVENOUS AS DIRECTED
COMMUNITY

## 2023-12-20 LAB
M TB IFN-G CD4+ T-CELLS BLD-ACNC: 0.03 IU/ML
M TB TUBERC IFN-G BLD QL: NEGATIVE
M TB TUBERC IGNF/MITOGEN IGNF CONTROL: >10 IU/ML
QFT TB1 AG MINUS NIL: -0.02 IU/ML
QFT TB2 AG MINUS NIL: 0 IU/ML

## 2024-02-01 ENCOUNTER — TELEPHONE (OUTPATIENT)
Dept: NEUROLOGY | Facility: CLINIC | Age: 64
End: 2024-02-01

## 2024-02-01 ENCOUNTER — APPOINTMENT (OUTPATIENT)
Dept: URBAN - METROPOLITAN AREA CLINIC 244 | Age: 64
Setting detail: DERMATOLOGY
End: 2024-02-02

## 2024-02-01 DIAGNOSIS — L40.0 PSORIASIS VULGARIS: ICD-10-CM

## 2024-02-01 PROCEDURE — OTHER COUNSELING: OTHER

## 2024-02-01 PROCEDURE — OTHER PRESCRIPTION MEDICATION MANAGEMENT: OTHER

## 2024-02-01 PROCEDURE — OTHER MIPS QUALITY: OTHER

## 2024-02-01 PROCEDURE — 99214 OFFICE O/P EST MOD 30 MIN: CPT

## 2024-02-01 ASSESSMENT — LOCATION DETAILED DESCRIPTION DERM: LOCATION DETAILED: LEFT ELBOW

## 2024-02-01 ASSESSMENT — LOCATION ZONE DERM: LOCATION ZONE: ARM

## 2024-02-01 ASSESSMENT — LOCATION SIMPLE DESCRIPTION DERM: LOCATION SIMPLE: LEFT ELBOW

## 2024-02-01 ASSESSMENT — BSA PSORIASIS: % BODY COVERED IN PSORIASIS: 10

## 2024-02-01 NOTE — TELEPHONE ENCOUNTER
Received request from [facility] for restart on vyvgart for February. Endorsed to provider for signature.

## 2024-02-01 NOTE — PROCEDURE: MIPS QUALITY
Quality 410: Psoriasis Clinical Response To Oral Systemic Or Biologic Medications: Psoriasis Assessment Tool Documented, Met Specified Benchmark
Detail Level: Detailed
Quality 176: Tuberculosis Screening Prior To First Course Of Biologic And/Or Immune Response Modifier Therapy: Patient receiving first-time biologic and/or immune response modifier therapy, TB Screening Performed and Results Interpreted within 12 months

## 2024-02-01 NOTE — PROCEDURE: PRESCRIPTION MEDICATION MANAGEMENT
Detail Level: Zone
Continue Regimen: Cosentyx  for now till [place]  is approved PA is still pending . Can also continue topical
Render In Strict Bullet Format?: No

## 2024-02-02 NOTE — TELEPHONE ENCOUNTER
[place] order signed by Dr Villafana (ok for patient to start next cycle of Vyvgart 1160mg x 4 weeks in February 2024) and faxed to [facility] with fax confirmation received. Sent to scanning.

## 2024-02-06 ENCOUNTER — MED REC SCAN ONLY (OUTPATIENT)
Dept: FAMILY MEDICINE CLINIC | Facility: CLINIC | Age: 64
End: 2024-02-06

## 2024-02-12 ENCOUNTER — RX ONLY (RX ONLY)
Age: 64
End: 2024-02-12

## 2024-02-12 DIAGNOSIS — G70.00 MYASTHENIA GRAVIS (HCC): ICD-10-CM

## 2024-02-12 RX ORDER — SECUKINUMAB 150 MG/ML
INJECTION SUBCUTANEOUS Q4WEEKS
Qty: 1 | Refills: 12 | Status: ERX

## 2024-02-12 RX ORDER — PYRIDOSTIGMINE BROMIDE 60 MG/1
TABLET ORAL
Qty: 450 TABLET | Refills: 3 | Status: SHIPPED | OUTPATIENT
Start: 2024-02-12

## 2024-02-12 NOTE — TELEPHONE ENCOUNTER
Medication: PYRIDOSTIGMINE 60 MG Oral Tab      Date of last refill: 12/04/2022 (#450/0)  [place]     Last office visit: 11/16/2023  Due back to clinic per last office note:  Around 04/16/2024  Date next office visit scheduled:    Future Appointments   Date Time Provider Department Center   4/17/2024 12:55 PM Alma Villafana DO [facility] [place]           Last OV note recommendation:    ASSESSMENT/ACTIVE PROBLEM LIST:           Encounter Diagnoses   Name Primary?    Myasthenia gravis (HCC) Yes    Elevated liver enzymes           Discussion/Plan:  MG-ADL, proximal lower extremity fatigue improved with Vyvgart  At this time, continue Vyvgart once weekly 4 weeks on, 6 weeks off  Continue Cellcept 1250mg BID  Continue mestinon 60mg QID  Check CBC/CMP q 3 months     Elevated LFTs- possible Cellcept related, have nearly normalized on repeat blood draw with Cellcept decrease back to 1250mg BID from 1500mg BID, repeat CMP as above        Iron deficiency anemia- improved

## 2024-02-13 ENCOUNTER — RX ONLY (RX ONLY)
Age: 64
End: 2024-02-13

## 2024-02-13 RX ORDER — SECUKINUMAB 150 MG/ML
INJECTION SUBCUTANEOUS Q4WEEKS
Qty: 1 | Refills: 12 | Status: ERX

## 2024-02-28 ENCOUNTER — TELEPHONE (OUTPATIENT)
Dept: FAMILY MEDICINE CLINIC | Facility: CLINIC | Age: 64
End: 2024-02-28

## 2024-02-28 NOTE — TELEPHONE ENCOUNTER
LOV with  was 11/16/2022    Please reach out to patient to schedule CPX with , or determine if he has established with new PCP

## 2024-03-26 ENCOUNTER — TELEPHONE (OUTPATIENT)
Dept: NEUROLOGY | Facility: CLINIC | Age: 64
End: 2024-03-26

## 2024-03-27 ENCOUNTER — MED REC SCAN ONLY (OUTPATIENT)
Dept: FAMILY MEDICINE CLINIC | Facility: CLINIC | Age: 64
End: 2024-03-27

## 2024-04-03 DIAGNOSIS — G70.00 MYASTHENIA GRAVIS (HCC): Primary | ICD-10-CM

## 2024-04-03 RX ORDER — MYCOPHENOLATE MOFETIL 500 MG/1
TABLET ORAL
Qty: 150 TABLET | Refills: 0 | Status: SHIPPED | OUTPATIENT
Start: 2024-04-03

## 2024-04-03 NOTE — TELEPHONE ENCOUNTER
Medication: MYCOPHENOLATE MOFETIL 500 MG Oral Tab      Date of last refill: 11/16/2023 (#150/2)  [place]     Last office visit: 11/16/2023  Due back to clinic per last office note:  Around 04/16/2024  Date next office visit scheduled:    Future Appointments   Date Time Provider Department Center   4/17/2024 12:55 PM Alma Villafana DO [facility] [place]           Last OV note recommendation:    ASSESSMENT/ACTIVE PROBLEM LIST:           Encounter Diagnoses   Name Primary?    Myasthenia gravis (HCC) Yes    Elevated liver enzymes           Discussion/Plan:  MG-ADL, proximal lower extremity fatigue improved with Vyvgart  At this time, continue Vyvgart once weekly 4 weeks on, 6 weeks off  Continue Cellcept 1250mg BID  Continue mestinon 60mg QID  Check CBC/CMP q 3 months     Elevated LFTs- possible Cellcept related, have nearly normalized on repeat blood draw with Cellcept decrease back to 1250mg BID from 1500mg BID, repeat CMP as above        Iron deficiency anemia- improved

## 2024-04-09 ENCOUNTER — TELEPHONE (OUTPATIENT)
Dept: NEUROLOGY | Facility: CLINIC | Age: 64
End: 2024-04-09

## 2024-04-09 NOTE — TELEPHONE ENCOUNTER
Received Vyvgart support call from [email or web address]    MG-ADL  Response normal for: Talking, chewing, Swallowing, Breathing,impairment of ability to brush teeth or comb hair, double vision and eyelid droop.    Impairment of ability to arise from chair-Mild, sometimes uses arms    Fatigue  Patient states a little bit when starting things, feeling run down and on average    S/E-no  Missed doses-no    Elevated blood pressure during Vyvgart infusion. Initial /80, post therapy /82 pt on q 10 week cycle. Next cycle starts 4/30/24    Endorsed to provider for review.

## 2024-04-11 NOTE — TELEPHONE ENCOUNTER
Received reviewed support call back from provider.     -Initial BP elevated, needs to follow up with PCP-patient advised via [facility]

## 2024-04-16 ENCOUNTER — TELEPHONE (OUTPATIENT)
Dept: NEUROLOGY | Facility: CLINIC | Age: 64
End: 2024-04-16

## 2024-04-16 ENCOUNTER — RX ONLY (RX ONLY)
Age: 64
End: 2024-04-16

## 2024-04-16 RX ORDER — TAPINAROF 10 MG/1000MG
CREAM TOPICAL
Qty: 60 | Refills: 2 | Status: ERX

## 2024-04-16 RX ORDER — TAPINAROF 10 MG/1000MG
CREAM TOPICAL
Qty: 60 | Refills: 2 | Status: ERX | COMMUNITY
Start: 2024-04-16

## 2024-04-16 NOTE — TELEPHONE ENCOUNTER
Received request to approved next cycle of Vyvgart 1160 mg weekly x 4 weeks in April. Endorsed to provider for signature.

## 2024-04-16 NOTE — TELEPHONE ENCOUNTER
Rec'd authorization req for Pt's next cycle of Vyvgart 1160 mg weekly x 4 weeks for Provider review and signature. Placed in RN bin for p/u.

## 2024-04-18 NOTE — TELEPHONE ENCOUNTER
Rec'd [place] from [facility] requesting [place] for Vyvart 1160mg weekly.  Authorization provided, will send signed copy back when provider returns her folder.

## 2024-04-19 NOTE — TELEPHONE ENCOUNTER
Received signed order back from provider. Faxed to [employer]. Confirmation received. Endorsed to scanning.

## 2024-05-09 ENCOUNTER — APPOINTMENT (OUTPATIENT)
Dept: URBAN - METROPOLITAN AREA CLINIC 244 | Age: 64
Setting detail: DERMATOLOGY
End: 2024-05-09

## 2024-05-09 ENCOUNTER — TELEPHONE (OUTPATIENT)
Dept: NEUROLOGY | Facility: CLINIC | Age: 64
End: 2024-05-09

## 2024-05-09 DIAGNOSIS — L40.0 PSORIASIS VULGARIS: ICD-10-CM

## 2024-05-09 PROCEDURE — OTHER SEPARATE AND IDENTIFIABLE DOCUMENTATION: OTHER

## 2024-05-09 PROCEDURE — 99214 OFFICE O/P EST MOD 30 MIN: CPT | Mod: 25

## 2024-05-09 PROCEDURE — OTHER TALTZ INJECTION: OTHER

## 2024-05-09 PROCEDURE — OTHER TALTZ INITIATION: OTHER

## 2024-05-09 PROCEDURE — 96372 THER/PROPH/DIAG INJ SC/IM: CPT

## 2024-05-09 PROCEDURE — OTHER COUNSELING: OTHER

## 2024-05-09 ASSESSMENT — BSA PSORIASIS: % BODY COVERED IN PSORIASIS: 15

## 2024-05-09 ASSESSMENT — LOCATION SIMPLE DESCRIPTION DERM
LOCATION SIMPLE: LEFT ELBOW
LOCATION SIMPLE: ABDOMEN
LOCATION SIMPLE: RIGHT ELBOW
LOCATION SIMPLE: RIGHT KNEE
LOCATION SIMPLE: LEFT KNEE

## 2024-05-09 ASSESSMENT — LOCATION ZONE DERM
LOCATION ZONE: TRUNK
LOCATION ZONE: ARM
LOCATION ZONE: LEG

## 2024-05-09 ASSESSMENT — LOCATION DETAILED DESCRIPTION DERM
LOCATION DETAILED: PERIUMBILICAL SKIN
LOCATION DETAILED: LEFT KNEE
LOCATION DETAILED: RIGHT ELBOW
LOCATION DETAILED: LEFT LATERAL ABDOMEN
LOCATION DETAILED: RIGHT KNEE
LOCATION DETAILED: LEFT ELBOW

## 2024-05-09 NOTE — TELEPHONE ENCOUNTER
Received back from provider. Faxed back to [facility]. Confirmation received. Endorsed to scanning.

## 2024-05-09 NOTE — PROCEDURE: TALTZ INJECTION
Use Enhanced Ndc?: Yes
Include J-Code In Bill: No
J-Code: 
Consent: The risks of pain and injection site reactions were reviewed with the patient prior to the injection.
Administered By (Optional): MARLEE
Lot # (Optional): [id number]
Taltz Amount: 160 mg
Ndc (80 Mg/Ml Syringe): [address]-01
Ndc (80 Mg/Ml Pen): [address]-85
Expiration Date (Optional): Nov 16, 2025
[address] Billing Preferences: 1
Additional Comments: Pt administered the injection, LP supervised
Detail Level: None
Syringe Size Used (Required For Enhanced Ndc): 80 mg/ml Prefilled Pen
no

## 2024-05-09 NOTE — PROCEDURE: TALTZ INITIATION
Detail Level: Zone
Is Cyclosporine Contraindicated?: No
Taltz Monitoring Guidelines: A yearly test for tuberculosis is required while taking Taltz.
Pregnancy And Lactation Warning Text: The risk during pregnancy and breastfeeding is uncertain with this medication.
Taltz Dosing: 160mg SC x 1 at weeks 0 then 80mg SC at weeks 2, 4, 6, 8, 10 and 12 then 80mg SC every four weeks
Diagnosis (Required): Psoriasis

## 2024-05-14 ENCOUNTER — MED REC SCAN ONLY (OUTPATIENT)
Dept: FAMILY MEDICINE CLINIC | Facility: CLINIC | Age: 64
End: 2024-05-14

## 2024-05-15 ENCOUNTER — MED REC SCAN ONLY (OUTPATIENT)
Dept: NEUROLOGY | Facility: CLINIC | Age: 64
End: 2024-05-15

## 2024-05-23 ENCOUNTER — TELEPHONE (OUTPATIENT)
Dept: NEUROLOGY | Facility: CLINIC | Age: 64
End: 2024-05-23

## 2024-05-23 NOTE — TELEPHONE ENCOUNTER
Received notes back from provider. Faxed to [facility]. Confirmation received. Endorsed to scanning.

## 2024-06-06 ENCOUNTER — PATIENT MESSAGE (OUTPATIENT)
Dept: FAMILY MEDICINE CLINIC | Facility: CLINIC | Age: 64
End: 2024-06-06

## 2024-06-06 DIAGNOSIS — E11.65 TYPE 2 DIABETES MELLITUS WITH HYPERGLYCEMIA, WITHOUT LONG-TERM CURRENT USE OF INSULIN (HCC): ICD-10-CM

## 2024-06-06 DIAGNOSIS — E78.00 HYPERCHOLESTEROLEMIA: ICD-10-CM

## 2024-06-06 DIAGNOSIS — R73.9 HYPERGLYCEMIA: ICD-10-CM

## 2024-06-06 DIAGNOSIS — Z13.89 SCREENING FOR GENITOURINARY CONDITION: ICD-10-CM

## 2024-06-06 DIAGNOSIS — N40.2 PROSTATE NODULE: ICD-10-CM

## 2024-06-06 DIAGNOSIS — I10 ESSENTIAL HYPERTENSION: Primary | ICD-10-CM

## 2024-06-06 DIAGNOSIS — Z13.29 SCREENING FOR ENDOCRINE DISORDER: ICD-10-CM

## 2024-06-06 DIAGNOSIS — Z00.00 LABORATORY TESTS ORDERED AS PART OF A COMPLETE PHYSICAL EXAM (CPE): ICD-10-CM

## 2024-06-07 NOTE — TELEPHONE ENCOUNTER
Patient has an appointment on 08/27. Do you want any other labs for him. Dr. Villafana ordered CMP and CBC. I pended an order for a PSA , Lipids, TSH w/ reflex and Urinalysis w/ reflex .

## 2024-06-07 NOTE — TELEPHONE ENCOUNTER
From: Silvio Garner  To: Dayna Gibson  Sent: 6/6/2024 11:59 AM CDT  Subject: Labs     Good morning I’m going for labs for Dr Jerry is there any special labs you need

## 2024-06-08 DIAGNOSIS — I10 ESSENTIAL HYPERTENSION: Primary | ICD-10-CM

## 2024-06-08 DIAGNOSIS — E11.65 TYPE 2 DIABETES MELLITUS WITH HYPERGLYCEMIA, WITHOUT LONG-TERM CURRENT USE OF INSULIN (HCC): ICD-10-CM

## 2024-06-08 DIAGNOSIS — G47.9 SLEEP DIFFICULTIES: ICD-10-CM

## 2024-06-09 NOTE — TELEPHONE ENCOUNTER
Order for the blood work signed.  Patient can have those completed is fasting test have blood work done in August prior visit he can do my blood work and Dr. Villafana  blood work at the same time.  Thank you

## 2024-06-10 ENCOUNTER — PATIENT MESSAGE (OUTPATIENT)
Dept: FAMILY MEDICINE CLINIC | Facility: CLINIC | Age: 64
End: 2024-06-10

## 2024-06-10 RX ORDER — LISINOPRIL 40 MG/1
40 TABLET ORAL DAILY
Qty: 30 TABLET | Refills: 0 | Status: SHIPPED | OUTPATIENT
Start: 2024-06-10

## 2024-06-10 NOTE — TELEPHONE ENCOUNTER
Last Office Visit: 11/9/2023  Last Refill: 10/27/2023  Return to Clinic:   Protocol: failed   NOV: 8/27/2024    Requested Prescriptions     Pending Prescriptions Disp Refills    metFORMIN 500 MG Oral Tab 30 tablet 0     Sig: Take 1 tablet (500 mg total) by mouth 2 (two) times daily with meals. Schedule office visit.         Please authorize if acceptable.   Thank you!

## 2024-06-10 NOTE — TELEPHONE ENCOUNTER
LOV: 11/16/2022  for: 11/16/2022  Patient advised to RTC on:  3 months for CPE.   Nov:08/27/2024    Medication Quantity Refills Start End   LISINOPRIL 40 MG Oral Tab 90 tablet 0 4/27/2023 --   Sig:   TAKE 1 TABLET(40 MG) BY MOUTH DAILY

## 2024-06-10 NOTE — TELEPHONE ENCOUNTER
Last Office Visit: 11/9/2023  Last Refill: 4/28/2023  Return to Clinic:   Protocol: failed   NOV: 8/27/2024    Requested Prescriptions     Pending Prescriptions Disp Refills    temazepam 15 MG Oral Cap 30 capsule 0     Sig: Take 1 capsule (15 mg total) by mouth nightly as needed for Sleep.       Please authorize if acceptable.   Thank you!

## 2024-06-10 NOTE — TELEPHONE ENCOUNTER
From: Silvio Garner  To: Dayna Gibson  Sent: 6/10/2024 9:22 AM CDT  Subject: Labs     What labs do I have to fast for

## 2024-06-11 RX ORDER — TEMAZEPAM 15 MG/1
15 CAPSULE ORAL NIGHTLY PRN
Qty: 30 CAPSULE | Refills: 0 | Status: SHIPPED | OUTPATIENT
Start: 2024-06-11

## 2024-06-14 ENCOUNTER — LAB ENCOUNTER (OUTPATIENT)
Dept: LAB | Age: 64
End: 2024-06-14
Attending: FAMILY MEDICINE
Payer: COMMERCIAL

## 2024-06-14 DIAGNOSIS — Z00.00 LABORATORY TESTS ORDERED AS PART OF A COMPLETE PHYSICAL EXAM (CPE): ICD-10-CM

## 2024-06-14 DIAGNOSIS — Z13.89 SCREENING FOR GENITOURINARY CONDITION: ICD-10-CM

## 2024-06-14 DIAGNOSIS — R73.9 HYPERGLYCEMIA: ICD-10-CM

## 2024-06-14 DIAGNOSIS — Z13.29 SCREENING FOR ENDOCRINE DISORDER: ICD-10-CM

## 2024-06-14 DIAGNOSIS — G70.00 MYASTHENIA GRAVIS (HCC): ICD-10-CM

## 2024-06-14 DIAGNOSIS — N40.2 PROSTATE NODULE: ICD-10-CM

## 2024-06-14 LAB
ALBUMIN SERPL-MCNC: 4.7 G/DL (ref 3.2–4.8)
ALBUMIN/GLOB SERPL: 1.7 {RATIO} (ref 1–2)
ALP LIVER SERPL-CCNC: 120 U/L
ALT SERPL-CCNC: 137 U/L
ANION GAP SERPL CALC-SCNC: 6 MMOL/L (ref 0–18)
AST SERPL-CCNC: 146 U/L (ref ?–34)
BASOPHILS # BLD AUTO: 0.12 X10(3) UL (ref 0–0.2)
BASOPHILS NFR BLD AUTO: 1.6 %
BILIRUB SERPL-MCNC: 1.1 MG/DL (ref 0.2–1.1)
BILIRUB UR QL: NEGATIVE
BUN BLD-MCNC: 12 MG/DL (ref 9–23)
BUN/CREAT SERPL: 14.1 (ref 10–20)
CALCIUM BLD-MCNC: 10.1 MG/DL (ref 8.7–10.4)
CHLORIDE SERPL-SCNC: 101 MMOL/L (ref 98–112)
CLARITY UR: CLEAR
CO2 SERPL-SCNC: 30 MMOL/L (ref 21–32)
COLOR UR: YELLOW
CREAT BLD-MCNC: 0.85 MG/DL
CREAT UR-SCNC: 129.2 MG/DL
DEPRECATED RDW RBC AUTO: 43 FL (ref 35.1–46.3)
EGFRCR SERPLBLD CKD-EPI 2021: 96 ML/MIN/1.73M2 (ref 60–?)
EOSINOPHIL # BLD AUTO: 0.24 X10(3) UL (ref 0–0.7)
EOSINOPHIL NFR BLD AUTO: 3.1 %
ERYTHROCYTE [DISTWIDTH] IN BLOOD BY AUTOMATED COUNT: 12.7 % (ref 11–15)
EST. AVERAGE GLUCOSE BLD GHB EST-MCNC: 140 MG/DL (ref 68–126)
FASTING STATUS PATIENT QL REPORTED: NO
GLOBULIN PLAS-MCNC: 2.8 G/DL (ref 2–3.5)
GLUCOSE BLD-MCNC: 172 MG/DL (ref 70–99)
GLUCOSE UR-MCNC: NORMAL MG/DL
HBA1C MFR BLD: 6.5 % (ref ?–5.7)
HCT VFR BLD AUTO: 43.9 %
HGB BLD-MCNC: 14.5 G/DL
HGB UR QL STRIP.AUTO: NEGATIVE
IMM GRANULOCYTES # BLD AUTO: 0.08 X10(3) UL (ref 0–1)
IMM GRANULOCYTES NFR BLD: 1 %
KETONES UR-MCNC: NEGATIVE MG/DL
LEUKOCYTE ESTERASE UR QL STRIP.AUTO: NEGATIVE
LYMPHOCYTES # BLD AUTO: 1.28 X10(3) UL (ref 1–4)
LYMPHOCYTES NFR BLD AUTO: 16.5 %
MCH RBC QN AUTO: 30.8 PG (ref 26–34)
MCHC RBC AUTO-ENTMCNC: 33 G/DL (ref 31–37)
MCV RBC AUTO: 93.2 FL
MICROALBUMIN UR-MCNC: 3.3 MG/DL
MICROALBUMIN/CREAT 24H UR-RTO: 25.5 UG/MG (ref ?–30)
MONOCYTES # BLD AUTO: 0.97 X10(3) UL (ref 0.1–1)
MONOCYTES NFR BLD AUTO: 12.5 %
NEUTROPHILS # BLD AUTO: 5.05 X10 (3) UL (ref 1.5–7.7)
NEUTROPHILS # BLD AUTO: 5.05 X10(3) UL (ref 1.5–7.7)
NEUTROPHILS NFR BLD AUTO: 65.3 %
NITRITE UR QL STRIP.AUTO: NEGATIVE
OSMOLALITY SERPL CALC.SUM OF ELEC: 288 MOSM/KG (ref 275–295)
PH UR: 7 [PH] (ref 5–8)
PLATELET # BLD AUTO: 174 10(3)UL (ref 150–450)
POTASSIUM SERPL-SCNC: 3.5 MMOL/L (ref 3.5–5.1)
PROT SERPL-MCNC: 7.5 G/DL (ref 5.7–8.2)
PSA SERPL-MCNC: 0.48 NG/ML (ref ?–4)
RBC # BLD AUTO: 4.71 X10(6)UL
SODIUM SERPL-SCNC: 137 MMOL/L (ref 136–145)
SP GR UR STRIP: 1.02 (ref 1–1.03)
TSI SER-ACNC: 1.39 MIU/ML (ref 0.55–4.78)
UROBILINOGEN UR STRIP-ACNC: NORMAL
WBC # BLD AUTO: 7.7 X10(3) UL (ref 4–11)

## 2024-06-14 PROCEDURE — 84153 ASSAY OF PSA TOTAL: CPT | Performed by: FAMILY MEDICINE

## 2024-06-14 PROCEDURE — 84443 ASSAY THYROID STIM HORMONE: CPT | Performed by: FAMILY MEDICINE

## 2024-06-14 PROCEDURE — 82043 UR ALBUMIN QUANTITATIVE: CPT | Performed by: FAMILY MEDICINE

## 2024-06-14 PROCEDURE — 80053 COMPREHEN METABOLIC PANEL: CPT | Performed by: OTHER

## 2024-06-14 PROCEDURE — 85025 COMPLETE CBC W/AUTO DIFF WBC: CPT | Performed by: FAMILY MEDICINE

## 2024-06-14 PROCEDURE — 82570 ASSAY OF URINE CREATININE: CPT | Performed by: FAMILY MEDICINE

## 2024-06-14 PROCEDURE — 83036 HEMOGLOBIN GLYCOSYLATED A1C: CPT | Performed by: FAMILY MEDICINE

## 2024-06-14 PROCEDURE — 81003 URINALYSIS AUTO W/O SCOPE: CPT | Performed by: FAMILY MEDICINE

## 2024-06-20 ENCOUNTER — OFFICE VISIT (OUTPATIENT)
Dept: NEUROLOGY | Facility: CLINIC | Age: 64
End: 2024-06-20

## 2024-06-20 ENCOUNTER — TELEPHONE (OUTPATIENT)
Dept: ORTHOPEDICS CLINIC | Facility: CLINIC | Age: 64
End: 2024-06-20

## 2024-06-20 VITALS
HEIGHT: 72 IN | HEART RATE: 81 BPM | WEIGHT: 263 LBS | SYSTOLIC BLOOD PRESSURE: 150 MMHG | DIASTOLIC BLOOD PRESSURE: 80 MMHG | BODY MASS INDEX: 35.62 KG/M2 | RESPIRATION RATE: 21 BRPM | OXYGEN SATURATION: 98 %

## 2024-06-20 DIAGNOSIS — G70.00 MYASTHENIA GRAVIS (HCC): Primary | ICD-10-CM

## 2024-06-20 DIAGNOSIS — R79.89 ELEVATED LIVER FUNCTION TESTS: ICD-10-CM

## 2024-06-20 DIAGNOSIS — K76.0 FATTY LIVER: ICD-10-CM

## 2024-06-20 DIAGNOSIS — M17.0 PRIMARY OSTEOARTHRITIS OF BOTH KNEES: Primary | ICD-10-CM

## 2024-06-20 PROCEDURE — 3008F BODY MASS INDEX DOCD: CPT | Performed by: OTHER

## 2024-06-20 PROCEDURE — 99214 OFFICE O/P EST MOD 30 MIN: CPT | Performed by: OTHER

## 2024-06-20 PROCEDURE — 3079F DIAST BP 80-89 MM HG: CPT | Performed by: OTHER

## 2024-06-20 PROCEDURE — 3077F SYST BP >= 140 MM HG: CPT | Performed by: OTHER

## 2024-06-20 RX ORDER — IXEKIZUMAB 80 MG/ML
INJECTION, SOLUTION SUBCUTANEOUS
COMMUNITY
Start: 2024-05-16

## 2024-06-20 NOTE — TELEPHONE ENCOUNTER
Requesting visco (monovisc pending)    LOV:12/18/23  Last injection Monovisc: 12/18/24    Future Appointments   Date Time Provider Department Center   6/20/2024  1:15 PM Alma Villafana DO [facility] [place]   7/17/2024  1:40 PM Yolanda Major PA [facility]   8/5/2024  1:40 PM Renetta Mack MD [facility] [place]   8/27/2024  2:00 PM Dayna Gibson MD [facility] [id number]

## 2024-06-20 NOTE — PATIENT INSTRUCTIONS
Refill policies:    Allow 2-3 business days for refills; controlled substances may take longer.  Contact your pharmacy at least 5 days prior to running out of medication and have them send an electronic request or submit request through the “request refill” option in your [employer] account.  Refills are not addressed on weekends; covering physicians do not authorize routine medications on weekends.  No narcotics or controlled substances are refilled after noon on Fridays or by on call physicians.  By law, narcotics must be electronically prescribed.  A 30 day supply with no refills is the maximum allowed.  If your prescription is due for a refill, you may be due for a follow up appointment.  To best provide you care, patients receiving routine medications need to be seen at least once a year.  Patients receiving narcotic/controlled substance medications need to be seen at least once every 3 months.  In the event that your preferred pharmacy does not have the requested medication in stock (e.g. Backordered), it is your responsibility to find another pharmacy that has the requested medication available.  We will gladly send a new prescription to that pharmacy at your request.    Scheduling Tests:    If your physician has ordered radiology tests such as MRI or CT scans, please contact Central Scheduling at [phone] right away to schedule the test.  Once scheduled, the [facility] Centralized Referral Team will work with your insurance carrier to obtain pre-certification or prior authorization.  Depending on your insurance carrier, approval may take 3-10 days.  It is highly recommended patients assure they have received an authorization before having a test performed.  If test is done without insurance authorization, patient may be responsible for the entire amount billed.      Precertification and Prior Authorizations:  If your physician has recommended that you have a procedure or additional testing performed the [facility]  Centralized Referral Team will contact your insurance carrier to obtain pre-certification or prior authorization.    You are strongly encouraged to contact your insurance carrier to verify that your procedure/test has been approved and is a COVERED benefit.  Although the [facility] Centralized Referral Team does its due diligence, the insurance carrier gives the disclaimer that \"Although the procedure is authorized, this does not guarantee payment.\"    Ultimately the patient is responsible for payment.   Thank you for your understanding in this matter.  Paperwork Completion:  If you require FMLA or disability paperwork for your recovery, please make sure to either drop it off or have it faxed to our office at [phone]. Be sure the form has your name and date of birth on it.  The form will be faxed to our Forms Department and they will complete it for you.  There is a 25$ fee for all forms that need to be filled out.  Please be aware there is a 10-14 day turnaround time.  You will need to sign a release of information (ALVINO) form if your paperwork does not come with one.  You may call the Forms Department with any questions at [phone].  Their fax number is [phone].          Repeat liver test around July 15th

## 2024-06-20 NOTE — PROGRESS NOTES
[facility] - [place]   Neurology    Silvio Garner Patient Status:  No patient class for patient encounter    1960 MRN [id number]   Location [facility], [facility], [place] PCP Dayna Gibson MD              HPI:   Silvio Garner is a(n) 64 year old male who presents for evaluation of myasthenia gravis. He had Covid- fever and fatigue in 2020, and shortly after was diagnosed with myasthenia gravis. During Covid he was also having short term memory loss. Two days before fever started, he noticed his left eye was drooping. Four weeks later he started seeing double, and he was hospitalized for this in 2020. He had achR ab testing which was positive, and was diagnosed with myasthenia gravis. Denies limb weakness, denies dysphagia. Reports occasionally his jaw hurts at times when he is chewing, near angle of jaw. Liquids are not a problem. No am headaches. His left eye has been drooping and still has diplopia. Improved over the last 5 days after starting prednisone. He started on 10mg of prednisone on 20, then every 2 weeks he has been going up by 10mg. On 20 he started 40mg of prednisone. He has far vision in his right eye, and near vision in his left eye, which is from cataracts, and regularly sees ophtho. Works as , and currently on disability due to diplopia. He is on mestinon 60/60/30/30mg. On 60mg QID he had diarrhea. Wife reports he has always had garbled/fast speech, but now it is more garbled. It is also occasionally more raspy and quiet, especially after a lot of talking.  Interim  Since last visit, one weekend he had flu like symptoms which resolved when imuran was stopped. He was still having sweating after flu like symptoms went away, and he self decreased prednisone to 40mg due to this. That helped with the sweating. Reports feeling pressure over his left frontal region which is worse with diplopia. Walking can trigger it, as  can focusing on something visually. Taking mestinon 60/60/60/30mg. His speech is less raspy. Diplopia is improved with 40mg of prednisone. Balance is a little worse, L knee is very painful. Going upstairs feels fatiguing. Walking slow helps legs not feel heavy. Saw ophtho.  Interim  Since last visit, he is taking prednisone 50mg daily, and started IVIG. He is taking mestinon 6 times a day. He feels like getting up off couch is easier now. He can walk around the block but then gets tired. He cannot stand for a long time.  Interim  Since last visit, he was hospitalized at [place] for glucose in the 500's, and at [facility] for acute GI bleed. He had an ulcer clipped and received 3 units of blood. Reports raspy voice is new since summer when eye weakness started. When he was off mestinon last week for a few days, he developed blurry vision- 1.5 images not double. He was started on amlodipine 2 weeks ago. BP's were high with IVIG.  Interim  Since last visit, reports he feels stronger. Denies diplopia. LE's still a little weak, but much better. He is on prednisone taper 5mg now. He is on metformin. He is taking iron, and having CBC monitored. He has for the past 2 weeks, right shoulder pain, with lifting arms above head, from lower neck up to humeral head. Sitting and leaning head forward can also trigger it.   Interim  Since last visit, he weaned off prednisone, and has been doing better. Hgb is up to 11.2 now. Works 6.5 hours a day, and afterward his legs feel tired, has to sit down more. He is on Cellcept 500mg BID. Feels that leg strength is still getting better each month. Denies any problems with jaw or vision at all.   Interim  Since last visit, reports thighs feel tired at the end of the work day. If at home, feels the leg weakness less. Turns can occasionally be hard. Stairs are better. Denies ptosis, denies diplopia. Prednisone was weaned off starting in 12/2020. Repeat endoscopy showed healed ulcer.  Interim  Since  last visit, patient reports cramps in L > R calf are better, but occasionally now gets cramp in left foot. Also very rarely, has tingling in the outer left two toes. Reports overall leg weakness is better. Hgb also has been slowly coming up. His legs still get tired, but better.   Interim  Since last visit, patient reports occasional fatigue in his lower extremities after a long day at work. Working 7 hours a day. Prior to MG diagnosis, he was working 9 hour days.  Interim  Since last visit, if sits for a long time, still have cramping in calf. No back pain. Did not do PT. Increase in mestinon to 4 times a day has helped. Has insomnia for a year. Tolerating Cellcept 1250mg BID.  Interim  Patient last seen a year ago. Was recommended to come back in 3 months. Increased Cellcept to 1500mg BID last year. Feels like LE fatigue is about the same. Thinks leg symptoms are partially due to bilateral knee OA.   Interim  Since starting vyvgart, patient reports thigh fatigue and weakness are 40% better. He is getting home infusions. Has received one set of 4 infusions, and for 6 weeks later, was due 118/23.  Interim  Occasionally when walking and when turns, may start to loose balance. Denies numbness in feet. Has leg fatigue on days that he works when he stands a lot. When he just stands up, feels like it is hard to turn at times. No diplopia, no dysphagia, no arm weakness. Getting Vyvgart 4 weeks on, 6 weeks off. Reports drinks 2 drinks per night.         ---  Vyvgart started 8/30/23  Prednisone started 8/17/20, 10/2020 decreased to 40mg due to sweating, 50mg on 10/9/20, off in 2/2021  Imuran started 9/2/20- stopped in late 9/2020 due to flu like reaction  IVIG start 10/22/20, second one 11/12  Cellcept started 11/2019  500mg BID, 750mg BID in 7/2021, 1000mg BID in 9/2021, 1250mg BID in 4/2022    Component      Latest Ref Rng 6/14/2024   WBC      4.0 - 11.0 x10(3) uL 7.7    RBC      3.80 - 5.80 x10(6)uL 4.71    Hemoglobin       13.0 - 17.5 g/dL 14.5    Hematocrit      39.0 - 53.0 % 43.9    MCV      80.0 - 100.0 fL 93.2    MCH      26.0 - 34.0 pg 30.8    MCHC      31.0 - 37.0 g/dL 33.0    RDW-SD      35.1 - 46.3 fL 43.0    RDW      11.0 - 15.0 % 12.7    Platelet Count      150.0 - 450.0 10(3)uL 174.0    Prelim Neutrophil Abs      1.50 - 7.70 x10 (3) uL 5.05    Neutrophils Absolute      1.50 - 7.70 x10(3) uL 5.05    Lymphocytes Absolute      1.00 - 4.00 x10(3) uL 1.28      Component      Latest Ref Rng 6/14/2024   Immature Granulocyte %      % 1.0    Glucose      70 - 99 mg/dL 172 (H)    Sodium      136 - 145 mmol/L 137    Potassium      3.5 - 5.1 mmol/L 3.5    Chloride      98 - 112 mmol/L 101    Carbon Dioxide, Total      21.0 - 32.0 mmol/L 30.0    ANION GAP      0 - 18 mmol/L 6    BUN      9 - 23 mg/dL 12    CREATININE      0.70 - 1.30 mg/dL 0.85    BUN/CREATININE RATIO      10.0 - 20.0  14.1    CALCIUM      8.7 - 10.4 mg/dL 10.1    CALCULATED OSMOLALITY      275 - 295 mOsm/kg 288    EGFR      >=60 mL/min/1.73m2 96    ALT (SGPT)      10 - 49 U/L 137 (H)    AST (SGOT)      <=34 U/L 146 (H)    ALKALINE PHOSPHATASE      45 - 117 U/L 120 (H)    Total Bilirubin      0.2 - 1.1 mg/dL 1.1    PROTEIN, TOTAL      5.7 - 8.2 g/dL 7.5    Albumin      3.2 - 4.8 g/dL 4.7    Globulin      2.0 - 3.5 g/dL 2.8                  MRI L spine 8/2022  L1-L2:  No significant disc/facet abnormality, spinal stenosis, or foraminal narrowing.  L2-L3:  No significant disc/facet abnormality, spinal stenosis, or foraminal narrowing.  L3-L4:  Mild diffuse disc bulge.  No spinal stenosis or significant neural foraminal narrowing.  Mild facet joint arthropathy.  L4-L5:  Mild diffuse disc bulge with a right paracentral and lateral protrusion.  Mild bilateral neural foraminal narrowing.  No central canal stenosis.  Mild facet joint arthropathy.  Ligamentum flavum thickening narrows the lateral recesses  bilaterally.  L5-S1:  Degenerative disc disease.  Diffuse osteophyte  disc complex with mild bilateral neural foraminal narrowing, left greater than right.  No central canal stenosis.     Sagittal images demonstrate normal alignment without spondylolisthesis.  Vertebral body height maintained.  Multilevel disc space desiccation with decreased disc height particularly at the lumbar sacral level is most consistent with degenerative disc  disease.     PARASPINAL AREA:  Normal with no visible mass.    BONY STRUCTURES:  No fracture, pars defect, significant scoliosis, or osseous lesion.    CORD/CAUDA EQUINA:  Normal caliber, contour, and signal intensity.                     Impression   CONCLUSION:    1. Degenerative change with diffuse disc bulge particularly involves the lower lumbar spine without central canal stenosis.  Mild bilateral neural foraminal narrowing.       Hgb 5/2022 reviewed  Component      Latest Ref Rng & Units 2/27/2022   Glucose      70 - 99 mg/dL 96   Sodium      136 - 145 mmol/L 139   Potassium      3.5 - 5.1 mmol/L 4.2   Chloride      98 - 112 mmol/L 105   Carbon Dioxide, Total      21.0 - 32.0 mmol/L 29.0   ANION GAP      0 - 18 mmol/L 5   BUN      7 - 18 mg/dL 13   CREATININE      0.70 - 1.30 mg/dL 0.82   CALCIUM      8.5 - 10.1 mg/dL 9.1   CALCULATED OSMOLALITY      275 - 295 mOsm/kg 288   eGFR NON-AFR. AMERICAN      >=60 93   eGFR       >=60 108   AST (SGOT)      15 - 37 U/L 67 (H)   ALT (SGPT)      16 - 61 U/L 94 (H)   ALKALINE PHOSPHATASE      45 - 117 U/L 89   Total Bilirubin      0.1 - 2.0 mg/dL 0.6   PROTEIN, TOTAL      6.4 - 8.2 g/dL 7.0   Albumin      3.4 - 5.0 g/dL 3.5   Globulin      2.8 - 4.4 g/dL 3.5   A/G Ratio      1.0 - 2.0 1.0   Patient Fasting for CMP?       Yes     Component      Latest Ref Rng & Units 2/27/2022   WBC      4.0 - 11.0 x10(3) uL 7.2   RBC      4.30 - 5.70 x10(6)uL 4.83   Hemoglobin      13.0 - 17.5 g/dL 11.5 (L)   Hematocrit      39.0 - 53.0 % 40.0   Platelet Count      150.0 - 450.0 10(3)uL 196.0   MCV      80.0  - 100.0 fL 82.8   MCH      26.0 - 34.0 pg 23.8 (L)   MCHC      31.0 - 37.0 g/dL 28.8 (L)   RDW      % 15.0   Prelim Neutrophil Abs      1.50 - 7.70 x10 (3) uL 4.52   Neutrophils Absolute      1.50 - 7.70 x10(3) uL 4.52   Lymphocytes Absolute      1.00 - 4.00 x10(3) uL 1.19   Monocytes Absolute      0.10 - 1.00 x10(3) uL 0.91   Eosinophils Absolute      0.00 - 0.70 x10(3) uL 0.41   Basophils Absolute      0.00 - 0.20 x10(3) uL 0.09   Immature Granulocyte Absolute      0.00 - 1.00 x10(3) uL 0.10   Neutrophils %      % 62.6   Lymphocytes %      % 16.5   Monocytes %      % 12.6   Eosinophils %      % 5.7       EMG 11/3/2021   Conclusion:  1. This study does not show electrophysiologic evidence of a left lumbar radiculopathy. Of note, a negative needle portion does not completely rule out a radiculopathy. Clinical correlation is recommended.  2. There is no evidence of a polyneuropathy, or of any mononeuropathies.    Pertinent imaging and laboratory work-up:   Component      Latest Ref Rng & Units 5/5/2021   TSH      0.358 - 3.740 mIU/mL 1.320   Vitamin B12      193 - 986 pg/mL 424     CMP 7/4/21 reviewed  Component      Latest Ref Rng & Units 7/4/2021   WBC      4.0 - 11.0 x10(3) uL 6.6   RBC      4.30 - 5.70 x10(6)uL 5.16   Hemoglobin      13.0 - 17.5 g/dL 11.5 (L)   Hematocrit      39.0 - 53.0 % 39.7   MCV      80.0 - 100.0 fL 76.9 (L)   MCH      26.0 - 34.0 pg 22.3 (L)   MCHC      31.0 - 37.0 g/dL 29.0 (L)   RDW-SD      35.1 - 46.3 fL 50.6 (H)   RDW      11.0 - 15.0 % 18.7 (H)     AchR ab 7/29/20- 38  MusK antibody negative  [facility] 7/31/20 - unable to load CD- no mediastinal mass identified- visualized report, enlarged hilar lymph nodes, had covid during this  MRI brain 7/30/20- no acute intracranial abnormality.  TSH 2020- 1.4  CTA head- no stenosis or occlusion  A1c 6.6 7/29/20  Component      Latest Ref Rng & Units 4/10/2021 3/19/2021 2/25/2021   WBC      4.0 - 11.0 x10(3) uL 6.4 7.8 6.3   RBC      4.30 -  5.70 x10(6)uL 4.86 4.70 4.26 (L)   Hemoglobin      13.0 - 17.5 g/dL 11.2 (L) 10.8 (L) 9.9 (L)   Hematocrit      39.0 - 53.0 % 38.6 (L) 37.1 (L) 34.5 (L)   Platelet Count      150.0 - 450.0 10(3)uL 230.0 277.0 277.0   MCV      80.0 - 100.0 fL 79.4 (L) 78.9 (L) 81.0   MCH      26.0 - 34.0 pg 23.0 (L) 23.0 (L) 23.2 (L)   MCHC      31.0 - 37.0 g/dL 29.0 (L) 29.1 (L) 28.7 (L)   RDW      11.0 - 15.0 % 17.9 (H) 17.6 (H) 17.5 (H)   RDW-SD      35.1 - 46.3 fL 51.3 (H) 50.4 (H) 51.5 (H)     Component      Latest Ref Rng & Units 4/10/2021 2/25/2021   Glucose      70 - 99 mg/dL 105 (H) 84   Sodium      136 - 145 mmol/L 139 140   Potassium      3.5 - 5.1 mmol/L 3.7 3.5   Chloride      98 - 112 mmol/L 106 107   Carbon Dioxide, Total      21.0 - 32.0 mmol/L 27.0 28.0   ANION GAP      0 - 18 mmol/L 6 5   BUN      7 - 18 mg/dL 9 8   CREATININE      0.70 - 1.30 mg/dL 0.92 0.89   BUN/CREAT Ratio      10.0 - 20.0 9.8 (L) 9.0 (L)   CALCIUM      8.5 - 10.1 mg/dL 9.6 9.5   CALCULATED OSMOLALITY      275 - 295 mOsm/kg 287 288   eGFR NON-AFR. AMERICAN      >=60 88 91   eGFR       >=60 102 105   AST (SGOT)      15 - 37 U/L 44 (H) 54 (H)   ALT (SGPT)      16 - 61 U/L 49 61       CBC  8.1 7.8 8.7   4.9 5.1 5.4   15.5 15.5 16.8   46.4 46.8 50.0   94 92 93   31 31 31   33 33 34   168 163 183   12.0 11.4 12.6   14 14 14   0.00 0.00      7/30/20 CMP  Glucose 141        10/2017    attributed to fatty liver        Component      Latest Ref Rng & Units 12/4/2019 10/22/2010   Glucose      70 - 99 mg/dL 107 (H) 95   Sodium      136 - 145 mmol/L 139 140   Potassium      3.5 - 5.1 mmol/L 3.6 3.8   Chloride      98 - 112 mmol/L 103 103   Carbon Dioxide, Total      21.0 - 32.0 mmol/L 30.0 23   ANION GAP      0 - 18 mmol/L 6    BUN      7 - 18 mg/dL 12 13   CREATININE      0.70 - 1.30 mg/dL 0.96 0.89   BUN/CREAT Ratio      10.0 - 20.0 12.5    CALCIUM      8.5 - 10.1 mg/dL 9.2 9.1   CALCULATED OSMOLALITY      275 -  295 mOsm/kg 288    eGFR NON-AFR. AMERICAN      >=60 85    eGFR       >=60 98    ALT (SGPT)      16 - 61 U/L 109 (H) 87 (H)   AST (SGOT)      15 - 37 U/L 77 (H) 50 (H)       Past Medical History:  Past Medical History:    Anemia    Diabetes (HCC)    from chronic steriod use    High blood pressure    Lab test positive for detection of COVID-19 virus    5/2020, subsequent testing negative    Microcytic anemia    Myasthenia gravis (HCC)    7/2020    OTHER DISEASES    Prostate nodule    Psoriasis        Past Surgical History:  Past Surgical History:   Procedure Laterality Date    Cataract Bilateral     Colonoscopy  2009    normal with diverticuli    Colonoscopy,diagnostic  09/09/2009    Performed by DEONTE TYLER at [facility] [place]    Mastoidectomy,simple  11/29/2022    Other surgical history  1978    foot    Other surgical history  2009    prostate biopsy, normal    Upper gi endoscopy,biopsy  09/09/2009    duodenal ulcer       Family History:  family history includes Diabetes in his father; brain tumors in his father; malignant hyperthermia in his maternal cousin male; uremic poisoning in his paternal grandfather.  Father has psoriasis    Social History:   reports that he quit smoking about 26 years ago. His smoking use included cigarettes. He started smoking about 46 years ago. He has a 20 pack-year smoking history. He has never used smokeless tobacco. He reports current alcohol use. He reports that he does not use drugs.    Allergies:  No Known Allergies    MEDICATIONS:    Current Outpatient Medications:     TALTZ 80 MG/ML Subcutaneous Solution Auto-injector, , Disp: , Rfl:     temazepam 15 MG Oral Cap, Take 1 capsule (15 mg total) by mouth nightly as needed for Sleep., Disp: 30 capsule, Rfl: 0    metFORMIN 500 MG Oral Tab, Take 1 tablet (500 mg total) by mouth 2 (two) times daily with meals. Schedule office visit., Disp: 30 tablet, Rfl: 0    lisinopril 40 MG Oral Tab, Take 1 tablet (40 mg  total) by mouth daily., Disp: 30 tablet, Rfl: 0    Mycophenolate Mofetil 500 MG Oral Tab, TAKE 2 AND 1/2 TABLETS BY MOUTH TWICE DAILY, Disp: 150 tablet, Rfl: 0    pyridostigmine 60 MG Oral Tab, TAKE 1 TABLET BY MOUTH FIVE TIMES DAILY., Disp: 450 tablet, Rfl: 3    efgartigimod jamison-fcab (VYVGART) 400 MG/20ML Intravenous Solution injection, Inject into the vein As Directed., Disp: , Rfl:     PANTOPRAZOLE 40 MG Oral Tab EC, TAKE 1 TABLET(40 MG) BY MOUTH TWICE DAILY BEFORE MEALS, Disp: 180 tablet, Rfl: 1    ferrous sulfate 325 (65 FE) MG Oral Tab EC, Take 1 tablet (325 mg total) by mouth daily with breakfast., Disp: , Rfl: 0    COSENTYX SENSOREADY, 300 MG, 150 MG/ML Subcutaneous Solution Auto-injector, Inject 2 pen as directed every 30 (thirty) days., Disp: , Rfl:     Ascorbic Acid (VITAMIN C OR), Take 1,000 mg by mouth daily., Disp: , Rfl:     Acidophilus/Pectin Oral Cap, Take 1 capsule by mouth daily., Disp: , Rfl:     omega-3 fatty acids 1000 MG Oral Cap, Take 1,000 mg by mouth daily., Disp: , Rfl:     Turmeric 500 MG Oral Cap, Take 1 capsule by mouth every evening.  , Disp: , Rfl:     VITAMIN D 400 UNIT OR CAPS, 1 CAPSULE DAILY, Disp: , Rfl:       Review of Systems:   A comprehensive 10 point review of systems was completed.     Pertinent positives and negatives noted in the HPI.         PHYSICAL EXAM:   Neurologic Exam  Vitals  Vitals:    06/20/24 1321   BP: 150/80   Pulse: 81   Resp: 21     General Appearance: Patient is a 64 year old male in no acute distress  Cardiac: Normal rate & regular rhythm  Skin: There are no rashes or other skin lesions.  Musculoskeletal: There is no scoliosis, or joint deformities  Neurologic examination:  Mental status: Patient is alert, attentive, and oriented x 3. Language is coherent and fluent without aphasia. Memory, comprehension and ability to follow commands were intact. Stood up and down from chair 10 times ok, but at end had some shortness of breath  Cranial nerves II-XII:  Optic discs were sharp. Pupils were round and reacted to light. Extraocular movements were full, + subjective diplopia with upgaze only. There is left eye minimal ptosis, but also present on ID picture from 1/2020, question if anatomical. There was no jaw, palate or tongue weakness or atrophy. Facial sensation was normal. Hearing was grossly intact. Shoulder shrug was normal. No dysarthria today. Neck flexion/extention 5/5  Motor exam revealed normal muscle bulk and tone. No atrophy or fasciculations. Manual muscle testing revealed MRC grade 5/5 in HF and through all muscles.   Deep tendon reflexes were 2 at the biceps, brachioradialis, triceps, knee jerk, and ankle jerk. Plantar responses were flexor bilaterally.   Sensory exam revealed normal light touch perception. Vibratory perception and proprioception were intact at the toes. Pinprick and temperature were normal. Romberg sign was absent.  Complex motor skills revealed normal coordination. Finger-nose-finger intact.   Gait was narrow and stable, was able to walk on heels, toes and tandem without any difficulty.       ASSESSMENT/ACTIVE PROBLEM LIST:     Encounter Diagnoses   Name Primary?    Myasthenia gravis (HCC) Yes    Elevated liver function tests     Fatty liver        Discussion/Plan:  Elevated LFTs- possible Cellcept related, have nearly normalized on repeat blood draw with Cellcept decrease back to 1250mg BID from 1500mg BID in 2023. However, now are elevated again. Repeat LFT's in 3-4 weeks. If still elevated, will recommend evaluation with Dr. Gibson. Has history of fatty liver.    MG seropositive   Proximal lower extremity fatigue less frequent with Vyvgart  At this time, change Vyvgart once weekly 4 weeks on, 7 weeks off   Continue Cellcept 1250mg BID- check repeat LFT's as above  Continue mestinon 60mg QID  Check CBC/CMP q 3 months    Requested Prescriptions      No prescriptions requested or ordered in this encounter          We discussed in  depth regarding the diagnosis, prognosis, treatment. The patient was given ample opportunity to ask questions. All questions and concerns were addressed.      Yeni Villafana DO  Neuromuscular and General Neurology  [facility]         Myasthenia Gravis Activities of Daily Living (MG-ADL)  This questionnaire will assess the patient's symptoms and their impact on daily-living function.       None=0 Mild=1 Moderate=2 Severe=3 Score   Talking Normal          [x] Intermittent slurring or nasal speech      [] Constant slurring or nasal speech, but can be understood  [] [id number] speech        [] 0   Chewing Normal    [x] Fatigue with solid food  [] Fatigue with soft food  [] Gastric tube    [] 0   Swallowing Normal          [x] Rare episode of choking        [] Frequent choking necessitating changes in diet  [] Gastric tube          [] 0   Breathing Normal      [x] Shortness of breath with exertion  [] Shortness of breath at rest    [] Ventilator dependence      [] 0   Impairment of ability to brush teeth or comb hair Normal        [x] Extra effort, but no rest period needed  [] Rest period needed      [] Cannot do one of these functions      [] 0   Impairment of ability to arise from a chair Normal      [] Mild, sometimes uses arms  [x] Moderate, always uses arms  [] Severe, requires assistance    [] 1   Double vision Normal    [x] Occurs, but not daily  [] Daily, but no constant  [] Constant    [] 0   Eyelid droop Normal    [x] Occurs, but not daily  [] Daily, but not constant  [] Constant    [] 0   TOTAL SCORE     1 out of 24

## 2024-06-20 NOTE — TELEPHONE ENCOUNTER
Patient called to schedule gel injections with Dr. Mack for both of his knees.     He is aware there is an ordering and approval process involved -     He asked to be tentatively scheduled for Dr. Mack's next available appointment in August for now.    Please order Visco gel and have provider sign off.

## 2024-06-21 ENCOUNTER — TELEPHONE (OUTPATIENT)
Dept: ORTHOPEDICS CLINIC | Facility: CLINIC | Age: 64
End: 2024-06-21

## 2024-06-25 ENCOUNTER — TELEPHONE (OUTPATIENT)
Dept: NEUROLOGY | Facility: CLINIC | Age: 64
End: 2024-06-25

## 2024-06-25 DIAGNOSIS — E11.65 TYPE 2 DIABETES MELLITUS WITH HYPERGLYCEMIA, WITHOUT LONG-TERM CURRENT USE OF INSULIN (HCC): ICD-10-CM

## 2024-06-25 NOTE — TELEPHONE ENCOUNTER
6/20/2024 office visit notes faxed to [facility] with fax confirmation received. Patient notified.

## 2024-06-25 NOTE — TELEPHONE ENCOUNTER
Patient is calling stating he got a denial letter from his insurance stating Vyvgart will not be covered. Per patient insurance is requesting additional office notes.

## 2024-06-26 ENCOUNTER — TELEPHONE (OUTPATIENT)
Dept: NEUROLOGY | Facility: CLINIC | Age: 64
End: 2024-06-26

## 2024-06-26 NOTE — TELEPHONE ENCOUNTER
Received request for vyvgart to start next cycle of vyvgat 1160 mg weekly x 4 weeks on 7/9/2024.     Endorsed to provider for signature.

## 2024-06-26 NOTE — TELEPHONE ENCOUNTER
LOV: 11/16/2022  for: Blood pressure follow-up.   Patient advised to RTC on:  3 months for CPE.   Nov:08/27/2024      Medication Quantity Refills Start End   metFORMIN 500 MG Oral Tab 30 tablet 0 6/10/2024 --   Sig:   Take 1 tablet (500 mg total) by mouth 2 (two) times daily with meals.

## 2024-06-28 NOTE — TELEPHONE ENCOUNTER
Vyvgart order signed by provider, faxed to [facility] with fax confirmation received and sent to scanning.

## 2024-07-06 DIAGNOSIS — I10 ESSENTIAL HYPERTENSION: ICD-10-CM

## 2024-07-08 RX ORDER — LISINOPRIL 40 MG/1
40 TABLET ORAL DAILY
Qty: 30 TABLET | Refills: 0 | Status: SHIPPED | OUTPATIENT
Start: 2024-07-08

## 2024-07-08 NOTE — TELEPHONE ENCOUNTER
LOV:11/16/2022   for: Blood pressure follow-up.   Patient advised to RTC on:  3 months for CPE.   Nov:08/27/2024    Medication Quantity Refills Start End   lisinopril 40 MG Oral Tab 30 tablet 0 6/10/2024 --   Sig:   Take 1 tablet (40 mg total) by mouth daily.

## 2024-07-09 NOTE — TELEPHONE ENCOUNTER
Patient authorized visco to be scheduled:    DOS: 8/5/2024  PROVIDER: TABITHA  MEDICATION: MONOVISC  OFFICE LOCATION: [facility]  PULLED:   LABELED:  PLACED:

## 2024-07-12 DIAGNOSIS — G70.00 MYASTHENIA GRAVIS (HCC): ICD-10-CM

## 2024-07-12 RX ORDER — MYCOPHENOLATE MOFETIL 500 MG/1
TABLET ORAL
Qty: 150 TABLET | Refills: 0 | Status: SHIPPED | OUTPATIENT
Start: 2024-07-12

## 2024-07-12 NOTE — TELEPHONE ENCOUNTER
Medication: Mycophenolate mofetil 500 mg     Date of last refill: 04/03/2024 (#150/0)  [place] 06/08/2024     Last office visit: 06/20/2024  Due back to clinic per last office note:  8 months  Date next office visit scheduled:    Future Appointments   Date Time Provider Department Center   7/17/2024  1:40 PM Yolanda Major PA [facility]   8/5/2024  1:40 PM Renetta Mack MD [facility]   8/27/2024  2:00 PM Dayna Gibson MD [facility] [id number]           Last OV note recommendation:    Discussion/Plan:  Elevated LFTs- possible Cellcept related, have nearly normalized on repeat blood draw with Cellcept decrease back to 1250mg BID from 1500mg BID in 2023. However, now are elevated again. Repeat LFT's in 3-4 weeks. If still elevated, will recommend evaluation with Dr. Gibson. Has history of fatty liver.     MG seropositive   Proximal lower extremity fatigue less frequent with Vyvgart  At this time, change Vyvgart once weekly 4 weeks on, 7 weeks off   Continue Cellcept 1250mg BID- check repeat LFT's as above  Continue mestinon 60mg QID  Check CBC/CMP q 3 months

## 2024-07-12 NOTE — TELEPHONE ENCOUNTER
[facility] called in stated they need a refill for patient  the Mycophenolate Mofetil 500 MG Oral Tab  the patient is out.

## 2024-07-17 ENCOUNTER — OFFICE VISIT (OUTPATIENT)
Dept: ORTHOPEDICS CLINIC | Facility: CLINIC | Age: 64
End: 2024-07-17
Payer: COMMERCIAL

## 2024-07-17 VITALS — WEIGHT: 263 LBS | BODY MASS INDEX: 35.62 KG/M2 | HEIGHT: 72 IN

## 2024-07-17 DIAGNOSIS — M18.10 ARTHRITIS OF CARPOMETACARPAL (CMC) JOINT OF THUMB: Primary | ICD-10-CM

## 2024-07-17 PROCEDURE — 3008F BODY MASS INDEX DOCD: CPT | Performed by: PHYSICIAN ASSISTANT

## 2024-07-17 PROCEDURE — 20600 DRAIN/INJ JOINT/BURSA W/O US: CPT | Performed by: PHYSICIAN ASSISTANT

## 2024-07-17 PROCEDURE — 99213 OFFICE O/P EST LOW 20 MIN: CPT | Performed by: PHYSICIAN ASSISTANT

## 2024-07-17 RX ORDER — BETAMETHASONE SODIUM PHOSPHATE AND BETAMETHASONE ACETATE 3; 3 MG/ML; MG/ML
6 INJECTION, SUSPENSION INTRA-ARTICULAR; INTRALESIONAL; INTRAMUSCULAR; SOFT TISSUE ONCE
Status: COMPLETED | OUTPATIENT
Start: 2024-07-17 | End: 2024-07-17

## 2024-07-17 RX ADMIN — BETAMETHASONE SODIUM PHOSPHATE AND BETAMETHASONE ACETATE 6 MG: 3; 3 INJECTION, SUSPENSION INTRA-ARTICULAR; INTRALESIONAL; INTRAMUSCULAR; SOFT TISSUE at 13:59:00

## 2024-07-17 NOTE — PROGRESS NOTES
[facility] Note    Assessment/Plan:  63 year old with bilateral thumb CMC arthritis status post previous corticosteroid injections. Patient was taking Prednisone for pain management. No numbness or tingling at this time. Discussed plan with patient- agreed. Discussed option of surgery- will defer at this time. He got previous relief with the last steroid injection and thus we will repeat the steroid injection today for both thumbs.   Will continue to monitor patient's symptoms as he is looking to retire soon- could occur that once retiring his symptoms get better.  Patient is interested in having surgery at the end of the year.  He will contact us as needed  .   Left ring finger trigger digit status post 2 corticosteroid injection with no recurrence.    Patient is unable to take NSAIDs and patient has a history of myasthenia gravis.    Injection:    Written consent was obtained.  The skin was prepped with alcohol.  Ethyl chloride spray was used anesthetize the superficial skin.  A 25-gauge needle was used to inject 1 mL mixture of 1 mL of 6 mg of betamethasone and 1 mL of 1% lidocaine into bilateral thumb CMC joint.  Hemostasis achieved.  Band-Aid was applied.  Patient tolerated procedure without complication.    Follow up: 4 months or as needed.     Imaging:   3 views of the right thumb reveal CMC arthritis      Physical Exam:    Ht 6' (1.829 m)   Wt 250 lb (113.4 kg)   BMI 33.91 kg/m²     Bilateral thumbs: Bilateral thumb CMC joint pain on palpation and with CMC seesaw test.  There is a thumb adduction contracture beginning on the right side      CC: Right base of thumb pain    HPI: This 64 year old left-hand-dominant male presents with pain at the base of the thumb.  He has had pain there for a number of years.  Pain is moderate to severe.  It is worse with use of his hand.  He works as a making .  The pain is described as sharp and constant.  He has tried wearing hand brace.  The pain does not  wake him up at night.    Interval history: Patient continues to have pain at the base of his thumbs.    Past Medical History:    Anemia    Diabetes (HCC)    from chronic steriod use    High blood pressure    Lab test positive for detection of COVID-19 virus    5/2020, subsequent testing negative    Microcytic anemia    Myasthenia gravis (HCC)    7/2020    OTHER DISEASES    Prostate nodule    Psoriasis     Past Surgical History:   Procedure Laterality Date    Cataract Bilateral     Colonoscopy  2009    normal with diverticuli    Colonoscopy,diagnostic  09/09/2009    Performed by DEONTE TYLER at [facility] [place], [facility]    Mastoidectomy,simple  11/29/2022    Other surgical history  1978    foot    Other surgical history  2009    prostate biopsy, normal    Upper gi endoscopy,biopsy  09/09/2009    duodenal ulcer     Current Outpatient Medications   Medication Sig Dispense Refill    Mycophenolate Mofetil 500 MG Oral Tab TAKE 2 AND 1/2 TABLETS BY MOUTH TWICE DAILY 150 tablet 0    LISINOPRIL 40 MG Oral Tab TAKE 1 TABLET(40 MG) BY MOUTH DAILY 30 tablet 0    METFORMIN 500 MG Oral Tab TAKE 1 TABLET BY MOUTH TWICE DAILY WITH MEALS 60 tablet 1    TALTZ 80 MG/ML Subcutaneous Solution Auto-injector       temazepam 15 MG Oral Cap Take 1 capsule (15 mg total) by mouth nightly as needed for Sleep. 30 capsule 0    pyridostigmine 60 MG Oral Tab TAKE 1 TABLET BY MOUTH FIVE TIMES DAILY. 450 tablet 3    efgartigimod jamison-fcab (VYVGART) 400 MG/20ML Intravenous Solution injection Inject into the vein As Directed.      PANTOPRAZOLE 40 MG Oral Tab EC TAKE 1 TABLET(40 MG) BY MOUTH TWICE DAILY BEFORE MEALS 180 tablet 1    ferrous sulfate 325 (65 FE) MG Oral Tab EC Take 1 tablet (325 mg total) by mouth daily with breakfast.  0    COSENTYX SENSOREADY, 300 MG, 150 MG/ML Subcutaneous Solution Auto-injector Inject 2 pen as directed every 30 (thirty) days.      Ascorbic Acid (VITAMIN C OR) Take 1,000 mg by mouth daily.      Acidophilus/Pectin Oral  Cap Take 1 capsule by mouth daily.      omega-3 fatty acids 1000 MG Oral Cap Take 1,000 mg by mouth daily.      Turmeric 500 MG Oral Cap Take 1 capsule by mouth every evening.        VITAMIN D 400 UNIT OR CAPS 1 CAPSULE DAILY       No Known Allergies  Family History   Problem Relation Age of Onset    Diabetes Father     Other (brain tumors) Father     Other (uremic poisoning) Paternal Grandfather     Other (malignant hyperthermia) Maternal Cousin Male      Social History     Occupational History    Not on file   Tobacco Use    Smoking status: Former     Current packs/day: 0.00     Average packs/day: 1 pack/day for 20.0 years (20.0 ttl pk-yrs)     Types: Cigarettes     Start date: 1978     Quit date: 1998     Years since quittin.2    Smokeless tobacco: Never   Vaping Use    Vaping status: Never Used   Substance and Sexual Activity    Alcohol use: Yes     Comment: 1-2 per day during the week, 3-4 on weekends    Drug use: No    Sexual activity: Not on file    Yolanda Major PA-C  Hand, Wrist, & Elbow Surgery  Physician Assistant to Dr. Remberto [email or web address]  t: [phone]  f: [phone]

## 2024-07-19 NOTE — TELEPHONE ENCOUNTER
DOS: 8/5/2024  PROVIDER: TABITHA  MEDICATION: MONOVISC  OFFICE LOCATION: [facility]  PULLED: 07/19/24  LABELED:07/19/24  PLACED: 07/19/24-[facility]INET

## 2024-08-07 ENCOUNTER — TELEPHONE (OUTPATIENT)
Dept: NEUROLOGY | Facility: CLINIC | Age: 64
End: 2024-08-07

## 2024-08-08 DIAGNOSIS — I10 ESSENTIAL HYPERTENSION: ICD-10-CM

## 2024-08-08 NOTE — TELEPHONE ENCOUNTER
Received signed orders for plan of care from provider and faxed to [facility] with confirmation.

## 2024-08-08 NOTE — TELEPHONE ENCOUNTER
Received plan of care from [facility] 8/28/24-11/28/24. Placed in provider bin for review and signature.

## 2024-08-09 RX ORDER — LISINOPRIL 40 MG/1
40 TABLET ORAL DAILY
Qty: 30 TABLET | Refills: 0 | Status: SHIPPED | OUTPATIENT
Start: 2024-08-09

## 2024-08-09 NOTE — TELEPHONE ENCOUNTER
Last Office Visit: 11/16/22  Last Refill: 7/8/24  Return to Clinic: 3 months   Protocol: failed   NOV: 8/27/24    Requested Prescriptions     Pending Prescriptions Disp Refills    LISINOPRIL 40 MG Oral Tab [Pharmacy Med Name: LISINOPRIL 40MG TABLETS] 30 tablet 0     Sig: TAKE 1 TABLET(40 MG) BY MOUTH DAILY       Please approve if appropriate.     Thank you!

## 2024-08-12 ENCOUNTER — OFFICE VISIT (OUTPATIENT)
Dept: ORTHOPEDICS CLINIC | Facility: CLINIC | Age: 64
End: 2024-08-12
Payer: COMMERCIAL

## 2024-08-12 VITALS — WEIGHT: 255 LBS | HEIGHT: 72 IN | BODY MASS INDEX: 34.54 KG/M2

## 2024-08-12 DIAGNOSIS — M17.0 PRIMARY OSTEOARTHRITIS OF BOTH KNEES: Primary | ICD-10-CM

## 2024-08-12 RX ORDER — MOMETASONE FUROATE MONOHYDRATE 50 UG/1
2 SPRAY, METERED NASAL DAILY
COMMUNITY
Start: 2024-07-15 | End: 2024-10-13

## 2024-08-12 NOTE — PROGRESS NOTES
[facility] Orthopaedic Clinic Note      Chief Complaint: Recurrent bilateral knee pain    HPI: The patient is a 64 year old male returning for orthopaedic consultation with complaints of chronic recurrent bilateral knee pain.  Symptoms have been ongoing for for a few years but recently exacerbated with prolonged standing and walking.  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  No recent injury is reported.  Pain is localized to the anterior and medial aspect of both knees.  I have not seen him since December when he was provided bilateral Monovisc injections.  This reportedly provided symptomatic relief until recently, where pain has returned with activities of daily living over the past month or more.   He is hopeful for repeat administration today.    Past Medical History:    Anemia    Diabetes (HCC)    from chronic steriod use    High blood pressure    Lab test positive for detection of COVID-19 virus    5/2020, subsequent testing negative    Microcytic anemia    Myasthenia gravis (HCC)    7/2020    OTHER DISEASES    Prostate nodule    Psoriasis     Past Surgical History:   Procedure Laterality Date    Cataract Bilateral     Colonoscopy  2009    normal with diverticuli    Colonoscopy,diagnostic  09/09/2009    Performed by DEONTE TYLER at [facility], [facility]    Mastoidectomy,simple  11/29/2022    Other surgical history  1978    foot    Other surgical history  2009    prostate biopsy, normal    Upper gi endoscopy,biopsy  09/09/2009    duodenal ulcer     Current Outpatient Medications   Medication Sig Dispense Refill    mometasone furoate 50 MCG/ACT Nasal Suspension 2 sprays daily.      LISINOPRIL 40 MG Oral Tab TAKE 1 TABLET(40 MG) BY MOUTH DAILY 30 tablet 0    Mycophenolate Mofetil 500 MG Oral Tab TAKE 2 AND 1/2 TABLETS BY MOUTH TWICE DAILY 150 tablet 0    METFORMIN 500 MG Oral Tab TAKE 1 TABLET BY MOUTH TWICE DAILY WITH MEALS 60 tablet 1    TALTZ 80 MG/ML Subcutaneous Solution  Auto-injector       temazepam 15 MG Oral Cap Take 1 capsule (15 mg total) by mouth nightly as needed for Sleep. 30 capsule 0    pyridostigmine 60 MG Oral Tab TAKE 1 TABLET BY MOUTH FIVE TIMES DAILY. 450 tablet 3    efgartigimod jamison-fcab (VYVGART) 400 MG/20ML Intravenous Solution injection Inject into the vein As Directed.      PANTOPRAZOLE 40 MG Oral Tab EC TAKE 1 TABLET(40 MG) BY MOUTH TWICE DAILY BEFORE MEALS 180 tablet 1    ferrous sulfate 325 (65 FE) MG Oral Tab EC Take 1 tablet (325 mg total) by mouth daily with breakfast.  0    COSENTYX SENSOREADY, 300 MG, 150 MG/ML Subcutaneous Solution Auto-injector Inject 2 pen as directed every 30 (thirty) days.      Ascorbic Acid (VITAMIN C OR) Take 1,000 mg by mouth daily.      Acidophilus/Pectin Oral Cap Take 1 capsule by mouth daily.      omega-3 fatty acids 1000 MG Oral Cap Take 1,000 mg by mouth daily.      Turmeric 500 MG Oral Cap Take 1 capsule by mouth every evening.        VITAMIN D 400 UNIT OR CAPS 1 CAPSULE DAILY       No Known Allergies  Family History   Problem Relation Age of Onset    Diabetes Father     Other (brain tumors) Father     Other (uremic poisoning) Paternal Grandfather     Other (malignant hyperthermia) Maternal Cousin Male      Social History     Occupational History    Not on file   Tobacco Use    Smoking status: Former     Current packs/day: 0.00     Average packs/day: 1 pack/day for 20.0 years (20.0 ttl pk-yrs)     Types: Cigarettes     Start date: 1978     Quit date: 1998     Years since quittin.3    Smokeless tobacco: Never   Vaping Use    Vaping status: Never Used   Substance and Sexual Activity    Alcohol use: Yes     Comment: 1-2 per day during the week, 3-4 on weekends    Drug use: No    Sexual activity: Not on file        ROS:  Complete ROS reviewed by me and non-contributory to the chief complaint except as mentioned above.    Physical Exam:    Ht 6' (1.829 m)   Wt 255 lb (115.7 kg)   BMI 34.58 kg/m²    Constitutional: Well developed, well nourished 64 year old male  Psychological: NAD, alert and appropriate  Respiratory: Breathing comfortably on room air with RR of 10-14  Cardiac: Palpable distal pulses with pink warm extremities distally  Examination of the knees reveals neutral alignment.  There is no palpable effusion or warmth bilaterally.  Extensor mechanism is nontender to palpation at the insertions.  Intermittent click and crepitus is noted at the patellofemoral joint through an arc of motion estimated at 0-120 degrees.  Medial joint line is tender with moderate pain on Antonella's and Bud's tests bilaterally.  Ligaments are stable on varus valgus stress with no pain and solid endpoints bilaterally.  Lachman and posterior drawer are negative.  Neurovascular status is intact on sensory, motor and perfusion assessment distally.      Imaging: Multiple views of the right knee from 6/23/2021 with comparisons to the left personally viewed, demonstrating primarily medial compartment joint space narrowing suggestive of early osteoarthritis.      Impression  CONCLUSION:  1. Mild bilateral tricompartment osteoarthritis  2. No acute fracture dislocation.           Dictated by (CST): Zander Jones MD on 6/23/2021 at 4:29 PM      Finalized by (CST): Zander Jones MD on 6/23/2021 at 4:31 PM          Assessment/Diagnoses:  Diagnoses and all orders for this visit:    Primary osteoarthritis of both knees  -     hyaluronan (Monovisc) 88 MG/4ML intra-articular injection 88 mg  -     hyaluronan (Monovisc) 88 MG/4ML intra-articular injection 88 mg  -     DRAIN/INJECT LARGE JOINT/BURSA      Plan:  I reviewed imaging and exam findings with the patient.  The diagnosis is degenerative joint disease of the knees.  We discussed the etiology, natural history and treatment options in detail.  Treatments include activity modification, weight loss, anti-inflammatory use and possible injections.  In the long term, the  patient may become a candidate for total knee arthroplasty, but only if symptoms become severe despite exhaustive non-operative care.  For now, non-surgical treatment options are recommended.  Given minimal signs of synovitis or effusion, plus lasting relief from intra-articular viscosupplement injection in the past, the patient would like to proceed with Monovisc to both knees.  Risk, benefits and alternatives to visco supplement injection were reviewed including but not limited to needle infection, hypersensitivity reaction or failed improvement.  Unlike cortisone, benefit from viscosupplementation may take up to 4 weeks before noticeable improvement.  The patient expressed understanding and a desire to proceed.  If symptoms or not improving after about 4 to 6 weeks reassessment is advised.  All questions were answered and the patient verbalized understanding and appreciation.      Visco supplement Injection Procedure:  After discussing the risk benefits and alternatives to visco supplement injection including but not limited to needle infection, hypersensitivity reaction or failed improvement, the patient gave verbal consent to proceed.  Using meticulous sterile technique I injected 4 cc of 1% Xylocaine at the lateral patellofemoral joint of the right and left knees in sequence for local anesthesia.  After attempted aspiration, I injected the entire contents of the Monovisc syringe through an 18-gauge needle in sequence to the right and left knees to minimal resistance.  The patient tolerated this well, Band-Aids were applied, and instructions were given to contact us with any adverse reactions.       Renetta Mack MD, [facility]  Orthopaedic Surgery   Sports Medicine/Knee and Shoulder  [facility]  t: [phone]  f: [phone]           This document was partially prepared using Dragon Medical voice recognition software.  Although every attempt is made to correct errors  during dictation, discrepancies may still exist.

## 2024-08-13 ENCOUNTER — TELEPHONE (OUTPATIENT)
Dept: ORTHOPEDICS CLINIC | Facility: CLINIC | Age: 64
End: 2024-08-13

## 2024-08-19 ENCOUNTER — OFFICE VISIT (OUTPATIENT)
Dept: ORTHOPEDICS CLINIC | Facility: CLINIC | Age: 64
End: 2024-08-19
Payer: COMMERCIAL

## 2024-08-19 ENCOUNTER — HOSPITAL ENCOUNTER (OUTPATIENT)
Dept: GENERAL RADIOLOGY | Age: 64
Discharge: HOME | End: 2024-08-19
Attending: PHYSICIAN ASSISTANT
Payer: COMMERCIAL

## 2024-08-19 ENCOUNTER — TELEPHONE (OUTPATIENT)
Dept: ORTHOPEDICS CLINIC | Facility: CLINIC | Age: 64
End: 2024-08-19

## 2024-08-19 VITALS — WEIGHT: 255 LBS | BODY MASS INDEX: 34.54 KG/M2 | HEIGHT: 72 IN

## 2024-08-19 DIAGNOSIS — M25.562 LEFT KNEE PAIN, UNSPECIFIED CHRONICITY: ICD-10-CM

## 2024-08-19 DIAGNOSIS — Z01.89 ENCOUNTER FOR LOWER EXTREMITY COMPARISON IMAGING STUDY: ICD-10-CM

## 2024-08-19 DIAGNOSIS — M17.0 PRIMARY OSTEOARTHRITIS OF BOTH KNEES: Primary | ICD-10-CM

## 2024-08-19 DIAGNOSIS — M25.562 LEFT KNEE PAIN, UNSPECIFIED CHRONICITY: Primary | ICD-10-CM

## 2024-08-19 PROCEDURE — 73564 X-RAY EXAM KNEE 4 OR MORE: CPT | Performed by: PHYSICIAN ASSISTANT

## 2024-08-19 PROCEDURE — 3008F BODY MASS INDEX DOCD: CPT | Performed by: PHYSICIAN ASSISTANT

## 2024-08-19 PROCEDURE — 73562 X-RAY EXAM OF KNEE 3: CPT | Performed by: PHYSICIAN ASSISTANT

## 2024-08-19 PROCEDURE — 99213 OFFICE O/P EST LOW 20 MIN: CPT | Performed by: PHYSICIAN ASSISTANT

## 2024-08-19 NOTE — TELEPHONE ENCOUNTER
Spoke with pt. He is very concerned in regards to the left knee. States he had to leave work ~ advised that XRAYS were entered, and he took appt with Rasheeda this afternoon for review~

## 2024-08-19 NOTE — PROGRESS NOTES
[facility] Progress Note      Chief Complaint:  Left knee pain      Subjective: Silvio Garner is a 64 year old male who is here for follow-up of his left knee.  He has been seen in the past by Dr. Mack for his degenerative arthritis.  He received Monovisc injections 1 week ago.  He was doing well until yesterday when the left knee became more painful.  He denies any injury but reports doing some activities around the house.  Pain is localized to the medial aspect of the knee is worse with weightbearing activities.  It is achy in nature.  He notes some associated swelling.  He is concerned as he just received the gel injection last week.      Objective: Exam of the left knee and lower extremity reveals with overlying skin is intact.  There is a mild palpable effusion.  He has full extension.  He has no tenderness to palpation about the medial lateral joint line.  Sensation is present to light touch.        Imaging: XR KNEE (3 VIEWS), RIGHT (CPT=73562)    Addendum Date: 8/19/2024    CORRECTION Corrected on: 8/19/2024;   PROCEDURE: XR KNEE ROUTINE (3 VIEWS), RIGHT (CPT=73562)  COMPARISON: [facility], XR KNEE, COMPLETE (4 OR MORE VIEWS), RIGHT (CPT=73564), 6/23/2021, 2:30 PM.  INDICATIONS: Encounter for lower extremity comparison imaging study. Chronic left knee pain. No recent trauma.  TECHNIQUE: 3 views were obtained.   FINDINGS:  BONES: No acute fracture or dislocation.  Moderate medial joint space narrowing.  Mild-to-moderate patellofemoral joint space narrowing.  Mild marginal osteophyte formation is noted. SOFT TISSUES: Negative. No visible soft tissue swelling. EFFUSION: None visible. OTHER: Negative.  CONCLUSION:   Tricompartmental right knee osteoarthritis most significant in the medial compartment.     Dictated by (CST): Jaret Tavares MD on 8/19/2024 at 4:03 PM     Finalized by (CST): Jaret Tavares MD on 8/19/2024 at 4:05 PM    Dictated by (CST): Chaitanya  Jaret CERDA MD on 8/19/2024 at 4:08 PM     Finalized by (CST): Jaret Tavares MD on 8/19/2024 at 4:08 PM              Result Date: 8/19/2024  CONCLUSION:   Mild-to-moderate right knee osteoarthritis.    Dictated by (CST): Jaret Tavares MD on 8/19/2024 at 4:03 PM     Finalized by (CST): Jaret Tavares MD on 8/19/2024 at 4:05 PM          XR KNEE, COMPLETE (4 OR MORE VIEWS), LEFT (CPT=73564)    Result Date: 8/19/2024  CONCLUSION:  Tricompartmental left knee osteoarthritis most significant in the medial compartment.  Small suprapatellar effusion.       Dictated by (CST): Jaret Tavares MD on 8/19/2024 at 4:05 PM     Finalized by (CST): Jaret Tavares MD on 8/19/2024 at 4:07 PM               Assessment: Left knee degenerative joint disease and effusion      Plan: I discussed x-ray and exam findings with the patient are consistent with degenerative changes most pronounced in the medial and patellofemoral compartment.  Unfortunately I believe he experienced increased pain from an exacerbation of his underlying arthritis.  There are no signs of infection or hypersensitivity reaction.  He does have some mild swelling.  I recommend continued conservative treatment including oral anti-inflammatories for 5 to 7 days, compression bracing, ice and elevation.  An Ace wrap was given.  A work note was also given.  If he continues to have pain and swelling in the next 4 weeks, he can consider a cortisone injection at that time to improve inflammation.  He will follow-up in 1 month for reevaluation or sooner with questions, concerns, or worsening symptoms.        LOU AlonzoC  Orthopedic Surgery   [address] [place] [id number]   t: [phone]  f: [phone]

## 2024-08-19 NOTE — TELEPHONE ENCOUNTER
Pt seen on 08/12/2024- Monovisc injections in both knees~ was doing well until yesterday, states his left knee is not doing well yesterday and today. He would like repeat xrays of left knee-     Last Xrays were 06/23/2021    Pended for approval ~

## 2024-08-19 NOTE — TELEPHONE ENCOUNTER
Patient was seen in office on 8/12 and had YAS knee injections. He is having pain in the left knee and would like an x-ray order. Please advise

## 2024-08-21 DIAGNOSIS — E11.65 TYPE 2 DIABETES MELLITUS WITH HYPERGLYCEMIA, WITHOUT LONG-TERM CURRENT USE OF INSULIN (HCC): ICD-10-CM

## 2024-08-21 NOTE — TELEPHONE ENCOUNTER
Last Office Visit: 11/16/22  Last Refill: 6/26/24  Return to Clinic: 3 months   Protocol: passed  NOV: 8/27/24  Requested Prescriptions     Pending Prescriptions Disp Refills    metFORMIN 500 MG Oral Tab 60 tablet 1     Sig: Take 1 tablet (500 mg total) by mouth 2 (two) times daily with meals.         Please approve if appropriate.     Thank you!

## 2024-08-23 ENCOUNTER — LAB ENCOUNTER (OUTPATIENT)
Dept: LAB | Age: 64
End: 2024-08-23
Attending: FAMILY MEDICINE
Payer: COMMERCIAL

## 2024-08-23 DIAGNOSIS — Z00.00 LABORATORY TESTS ORDERED AS PART OF A COMPLETE PHYSICAL EXAM (CPE): ICD-10-CM

## 2024-08-23 DIAGNOSIS — G70.00 MYASTHENIA GRAVIS (HCC): ICD-10-CM

## 2024-08-23 DIAGNOSIS — R79.89 ELEVATED LIVER FUNCTION TESTS: ICD-10-CM

## 2024-08-23 DIAGNOSIS — E11.65 TYPE 2 DIABETES MELLITUS WITH HYPERGLYCEMIA, WITHOUT LONG-TERM CURRENT USE OF INSULIN (HCC): ICD-10-CM

## 2024-08-23 LAB
ALBUMIN SERPL-MCNC: 4.8 G/DL (ref 3.2–4.8)
ALBUMIN/GLOB SERPL: 1.8 {RATIO} (ref 1–2)
ALP LIVER SERPL-CCNC: 112 U/L
ALT SERPL-CCNC: 121 U/L
ANION GAP SERPL CALC-SCNC: 8 MMOL/L (ref 0–18)
AST SERPL-CCNC: 124 U/L (ref ?–34)
BASOPHILS # BLD AUTO: 0.12 X10(3) UL (ref 0–0.2)
BASOPHILS NFR BLD AUTO: 1.7 %
BILIRUB DIRECT SERPL-MCNC: 0.5 MG/DL (ref ?–0.3)
BILIRUB SERPL-MCNC: 1.1 MG/DL (ref 0.2–1.1)
BUN BLD-MCNC: 10 MG/DL (ref 9–23)
BUN/CREAT SERPL: 12.5 (ref 10–20)
CALCIUM BLD-MCNC: 10.5 MG/DL (ref 8.7–10.4)
CHLORIDE SERPL-SCNC: 104 MMOL/L (ref 98–112)
CHOLEST SERPL-MCNC: 191 MG/DL (ref ?–200)
CO2 SERPL-SCNC: 30 MMOL/L (ref 21–32)
CREAT BLD-MCNC: 0.8 MG/DL
DEPRECATED RDW RBC AUTO: 45.2 FL (ref 35.1–46.3)
EGFRCR SERPLBLD CKD-EPI 2021: 98 ML/MIN/1.73M2 (ref 60–?)
EOSINOPHIL # BLD AUTO: 0.26 X10(3) UL (ref 0–0.7)
EOSINOPHIL NFR BLD AUTO: 3.6 %
ERYTHROCYTE [DISTWIDTH] IN BLOOD BY AUTOMATED COUNT: 13.3 % (ref 11–15)
FASTING PATIENT LIPID ANSWER: YES
FASTING STATUS PATIENT QL REPORTED: YES
GLOBULIN PLAS-MCNC: 2.7 G/DL (ref 2–3.5)
GLUCOSE BLD-MCNC: 114 MG/DL (ref 70–99)
HCT VFR BLD AUTO: 40.5 %
HDLC SERPL-MCNC: 38 MG/DL (ref 40–59)
HGB BLD-MCNC: 13.2 G/DL
IMM GRANULOCYTES # BLD AUTO: 0.09 X10(3) UL (ref 0–1)
IMM GRANULOCYTES NFR BLD: 1.2 %
LDLC SERPL CALC-MCNC: 124 MG/DL (ref ?–100)
LYMPHOCYTES # BLD AUTO: 1.28 X10(3) UL (ref 1–4)
LYMPHOCYTES NFR BLD AUTO: 17.7 %
MCH RBC QN AUTO: 30.1 PG (ref 26–34)
MCHC RBC AUTO-ENTMCNC: 32.6 G/DL (ref 31–37)
MCV RBC AUTO: 92.5 FL
MONOCYTES # BLD AUTO: 0.97 X10(3) UL (ref 0.1–1)
MONOCYTES NFR BLD AUTO: 13.4 %
NEUTROPHILS # BLD AUTO: 4.53 X10 (3) UL (ref 1.5–7.7)
NEUTROPHILS # BLD AUTO: 4.53 X10(3) UL (ref 1.5–7.7)
NEUTROPHILS NFR BLD AUTO: 62.4 %
NONHDLC SERPL-MCNC: 153 MG/DL (ref ?–130)
OSMOLALITY SERPL CALC.SUM OF ELEC: 294 MOSM/KG (ref 275–295)
PLATELET # BLD AUTO: 152 10(3)UL (ref 150–450)
POTASSIUM SERPL-SCNC: 4.2 MMOL/L (ref 3.5–5.1)
PROT SERPL-MCNC: 7.5 G/DL (ref 5.7–8.2)
RBC # BLD AUTO: 4.38 X10(6)UL
SODIUM SERPL-SCNC: 142 MMOL/L (ref 136–145)
TRIGL SERPL-MCNC: 164 MG/DL (ref 30–149)
VLDLC SERPL CALC-MCNC: 29 MG/DL (ref 0–30)
WBC # BLD AUTO: 7.3 X10(3) UL (ref 4–11)

## 2024-08-23 PROCEDURE — 80053 COMPREHEN METABOLIC PANEL: CPT | Performed by: FAMILY MEDICINE

## 2024-08-23 PROCEDURE — 85025 COMPLETE CBC W/AUTO DIFF WBC: CPT | Performed by: OTHER

## 2024-08-23 PROCEDURE — 80061 LIPID PANEL: CPT | Performed by: FAMILY MEDICINE

## 2024-08-23 PROCEDURE — 82248 BILIRUBIN DIRECT: CPT | Performed by: OTHER

## 2024-08-23 RX ORDER — MYCOPHENOLATE MOFETIL 500 MG/1
TABLET ORAL
Qty: 150 TABLET | Refills: 0 | Status: SHIPPED | OUTPATIENT
Start: 2024-08-23

## 2024-08-23 NOTE — TELEPHONE ENCOUNTER
Medication: MYCOPHENOLATE MOFETIL 500 MG Oral Tab      Date of last refill: 07/12/2024 (#150/0)  [place]     Last office visit: 06/20/2024  Due back to clinic per last office note:  8 months   Date next office visit scheduled:    Future Appointments   Date Time Provider Department Center   8/27/2024  2:00 PM Dayna Gibson MD [facility] [id number]   8/28/2024  4:15 PM Remberto Hart MD [facility]           Last OV note recommendation:    ASSESSMENT/ACTIVE PROBLEM LIST:           Encounter Diagnoses   Name Primary?    Myasthenia gravis (HCC) Yes    Elevated liver function tests      Fatty liver           Discussion/Plan:  Elevated LFTs- possible Cellcept related, have nearly normalized on repeat blood draw with Cellcept decrease back to 1250mg BID from 1500mg BID in 2023. However, now are elevated again. Repeat LFT's in 3-4 weeks. If still elevated, will recommend evaluation with Dr. Gibson. Has history of fatty liver.     MG seropositive   Proximal lower extremity fatigue less frequent with Vyvgart  At this time, change Vyvgart once weekly 4 weeks on, 7 weeks off   Continue Cellcept 1250mg BID- check repeat LFT's as above  Continue mestinon 60mg QID  Check CBC/CMP q 3 months     Requested Prescriptions        No prescriptions requested or ordered in this encounter               We discussed in depth regarding the diagnosis, prognosis, treatment. The patient was given ample opportunity to ask questions. All questions and concerns were addressed.        Yeni Villafana DO  Neuromuscular and General Neurology  [facility]

## 2024-08-27 ENCOUNTER — OFFICE VISIT (OUTPATIENT)
Dept: FAMILY MEDICINE CLINIC | Facility: CLINIC | Age: 64
End: 2024-08-27
Payer: COMMERCIAL

## 2024-08-27 VITALS
WEIGHT: 260 LBS | HEART RATE: 92 BPM | HEIGHT: 72 IN | TEMPERATURE: 97 F | BODY MASS INDEX: 35.21 KG/M2 | SYSTOLIC BLOOD PRESSURE: 138 MMHG | RESPIRATION RATE: 18 BRPM | DIASTOLIC BLOOD PRESSURE: 70 MMHG

## 2024-08-27 DIAGNOSIS — Z00.00 PHYSICAL EXAM, ANNUAL: Primary | ICD-10-CM

## 2024-08-27 DIAGNOSIS — G47.9 SLEEP DIFFICULTIES: ICD-10-CM

## 2024-08-27 DIAGNOSIS — E78.00 HYPERCHOLESTEROLEMIA: ICD-10-CM

## 2024-08-27 DIAGNOSIS — E11.65 TYPE 2 DIABETES MELLITUS WITH HYPERGLYCEMIA, WITHOUT LONG-TERM CURRENT USE OF INSULIN (HCC): ICD-10-CM

## 2024-08-27 DIAGNOSIS — Z12.11 SCREEN FOR COLON CANCER: ICD-10-CM

## 2024-08-27 DIAGNOSIS — I10 ESSENTIAL HYPERTENSION: ICD-10-CM

## 2024-08-27 PROCEDURE — 3078F DIAST BP <80 MM HG: CPT | Performed by: FAMILY MEDICINE

## 2024-08-27 PROCEDURE — 3061F NEG MICROALBUMINURIA REV: CPT | Performed by: FAMILY MEDICINE

## 2024-08-27 PROCEDURE — 90471 IMMUNIZATION ADMIN: CPT | Performed by: FAMILY MEDICINE

## 2024-08-27 PROCEDURE — 3008F BODY MASS INDEX DOCD: CPT | Performed by: FAMILY MEDICINE

## 2024-08-27 PROCEDURE — 90715 TDAP VACCINE 7 YRS/> IM: CPT | Performed by: FAMILY MEDICINE

## 2024-08-27 PROCEDURE — 99397 PER PM REEVAL EST PAT 65+ YR: CPT | Performed by: FAMILY MEDICINE

## 2024-08-27 PROCEDURE — 3075F SYST BP GE 130 - 139MM HG: CPT | Performed by: FAMILY MEDICINE

## 2024-08-27 PROCEDURE — 3044F HG A1C LEVEL LT 7.0%: CPT | Performed by: FAMILY MEDICINE

## 2024-08-27 RX ORDER — LISINOPRIL 40 MG/1
40 TABLET ORAL DAILY
Qty: 90 TABLET | Refills: 1 | Status: CANCELLED
Start: 2024-08-27

## 2024-08-27 RX ORDER — TEMAZEPAM 15 MG/1
15 CAPSULE ORAL NIGHTLY PRN
Qty: 30 CAPSULE | Refills: 0 | Status: CANCELLED
Start: 2024-08-27

## 2024-08-27 NOTE — PROGRESS NOTES
Silvio Garner is a 64 year old male who presents for a complete physical exam.   HPI:   Pt is planning to have a surgery for his right hand. The  blood sugar was elevated patient will try to watch his diet continue metformin recheck blood work before visit in November/medication check.  Liver function test came back elevated neurologist is planning to decrease CellCept dose continue checking liver function test.  Patient has new grandchild he would like tetanus shot to be updated.    Immunization History   Administered Date(s) Administered   • Covid-19 Vaccine Moderna 100 mcg/0.5 ml 03/01/2021, 03/30/2021, 09/24/2021   • Covid-19 Vaccine Moderna 50 Mcg/0.25 Ml 08/10/2022   • Covid-19 Vaccine Pfizer Bivalent 30mcg/0.3mL 12/06/2022   • DT 06/01/2009   • FLU VAC High Dose 65 YRS & Older PRSV Free (98648) 10/01/2023   • FLULAVAL 6 months & older 0.5 ml Prefilled syringe (83422) 10/06/2021   • FLUZONE 6 months and older PFS 0.5 ml (75758) 11/16/2018, 11/12/2019, 10/06/2021, 11/11/2022   • Flucelvax 0.5 Ml Quad PFS Single Dose 09/29/2020   • Flucelvax Influenza vaccine, trivalent (ccIIV3), 0.5mL IM 09/29/2020   • Influenza 11/21/2017, 09/29/2020   • Influenza Virus Vaccine, Split 12/01/2017   • Pfizer Covid-19 Vaccine 30mcg/0.3ml 12yrs+ ([id number]) 10/01/2023, 04/09/2024   • Pneumovax 23 11/12/2019   • Zoster Vaccine Recombinant Adjuvanted (Shingrix) 12/29/2019, 03/20/2020   Pended Date(s) Pended   • TDAP 08/27/2024     Wt Readings from Last 6 Encounters:   08/27/24 260 lb (117.9 kg)   08/19/24 255 lb (115.7 kg)   08/12/24 255 lb (115.7 kg)   07/17/24 263 lb (119.3 kg)   06/20/24 263 lb (119.3 kg)   12/18/23 257 lb (116.6 kg)     Body mass index is 35.26 kg/m².     Cholesterol, Total (mg/dL)   Date Value   08/23/2024 191   02/27/2022 170   10/02/2021 200 (H)   10/22/2010 213 (H)     HDL Cholesterol (mg/dL)   Date Value   08/23/2024 38 (L)   02/27/2022 43   10/02/2021 40   10/22/2010 37 (L)     LDL Cholesterol Calc  (mg/dL)   Date Value   10/22/2010 131 (H)     LDL Cholesterol (mg/dL)   Date Value   08/23/2024 124 (H)   02/27/2022 102 (H)   10/02/2021 120 (H)     Triglycerides (mg/dL)   Date Value   10/22/2010 225 (H)     AST (SGOT) (IU/L)   Date Value   10/22/2010 50 (H)     AST (U/L)   Date Value   08/23/2024 124 (H)   06/14/2024 146 (H)   11/14/2023 52 (H)     ALT (SGPT) (IU/L)   Date Value   10/22/2010 87 (H)     ALT (U/L)   Date Value   08/23/2024 121 (H)   06/14/2024 137 (H)   11/14/2023 66 (H)      PROSTATE SPECIFIC AG, SERUM (ng/mL)   Date Value   10/22/2010 0.4   08/27/2009 0.5     Total PSA  (ng/mL)   Date Value   06/14/2024 0.48     Glucose   Date Value Ref Range Status   10/22/2010 95 65 - 99 mg/dL Final       Current Outpatient Medications   Medication Sig Dispense Refill   • MYCOPHENOLATE MOFETIL 500 MG Oral Tab TAKE 2 AND 1/2 TABLETS BY MOUTH TWICE DAILY 150 tablet 0   • metFORMIN 500 MG Oral Tab Take 1 tablet (500 mg total) by mouth 2 (two) times daily with meals. 60 tablet 0   • mometasone furoate 50 MCG/ACT Nasal Suspension 2 sprays daily.     • LISINOPRIL 40 MG Oral Tab TAKE 1 TABLET(40 MG) BY MOUTH DAILY 30 tablet 0   • TALTZ 80 MG/ML Subcutaneous Solution Auto-injector      • temazepam 15 MG Oral Cap Take 1 capsule (15 mg total) by mouth nightly as needed for Sleep. 30 capsule 0   • pyridostigmine 60 MG Oral Tab TAKE 1 TABLET BY MOUTH FIVE TIMES DAILY. 450 tablet 3   • efgartigimod jamison-fcab (VYVGART) 400 MG/20ML Intravenous Solution injection Inject into the vein As Directed.     • PANTOPRAZOLE 40 MG Oral Tab EC TAKE 1 TABLET(40 MG) BY MOUTH TWICE DAILY BEFORE MEALS 180 tablet 1   • ferrous sulfate 325 (65 FE) MG Oral Tab EC Take 1 tablet (325 mg total) by mouth daily with breakfast.  0   • COSENTYX SENSOREADY, 300 MG, 150 MG/ML Subcutaneous Solution Auto-injector Inject 2 pen as directed every 30 (thirty) days.     • Ascorbic Acid (VITAMIN C OR) Take 1,000 mg by mouth daily.     • Acidophilus/Pectin Oral  Cap Take 1 capsule by mouth daily.     • omega-3 fatty acids 1000 MG Oral Cap Take 1,000 mg by mouth daily.     • Turmeric 500 MG Oral Cap Take 1 capsule by mouth every evening.       • VITAMIN D 400 UNIT OR CAPS 1 CAPSULE DAILY        Past Medical History:   • Anemia   • Diabetes (HCC)    from chronic steriod use   • High blood pressure   • Lab test positive for detection of COVID-19 virus    2020, subsequent testing negative   • Microcytic anemia   • Myasthenia gravis (HCC)    2020   • OTHER DISEASES   • Prostate nodule   • Psoriasis      Past Surgical History:   Procedure Laterality Date   • Cataract Bilateral    • Colonoscopy      normal with diverticuli   • Colonoscopy,diagnostic  2009    Performed by DEONTE TYLER at [facility]   • Mastoidectomy,simple  2022   • Other surgical history      foot   • Other surgical history      prostate biopsy, normal   • Upper gi endoscopy,biopsy  2009    duodenal ulcer      Family History   Problem Relation Age of Onset   • Diabetes Father    • Other (brain tumors) Father    • Other (uremic poisoning) Paternal Grandfather    • Other (malignant hyperthermia) Maternal Cousin Male       Social History:  Social History     Socioeconomic History   • Marital status:    Tobacco Use   • Smoking status: Former     Current packs/day: 0.00     Average packs/day: 1 pack/day for 20.0 years (20.0 ttl pk-yrs)     Types: Cigarettes     Start date: 1978     Quit date: 1998     Years since quittin.3   • Smokeless tobacco: Never   Vaping Use   • Vaping status: Never Used   Substance and Sexual Activity   • Alcohol use: Yes     Comment: 1-2 per day during the week, 3-4 on weekends   • Drug use: No   Other Topics Concern   • Caffeine Concern Yes     Comment: 2cups per day   • Exercise No     Comment: not really     Social Determinants of Health     Financial Resource Strain: Low Risk  (2024)    Received from [facility]    Overall Financial Resource Strain (CARDIA)    • Difficulty of Paying Living Expenses: Not hard at all   Food Insecurity: No Food Insecurity (7/13/2024)    Received from [facility]    Hunger Vital Sign    • Worried About Running Out of Food in the Last Year: Never true    • Ran Out of Food in the Last Year: Never true   Transportation Needs: Unknown (7/13/2024)    Received from [facility]    PRAPARE - Transportation    • Lack of Transportation (Non-Medical): No   Housing Stability: Low Risk  (7/13/2024)    Received from [facility]    Housing Stability Vital Sign    • Unable to Pay for Housing in the Last Year: No    • Number of Places Lived in the Last Year: 1    • In the last 12 months, was there a time when you did not have a steady place to sleep or slept in a shelter (including now)?: No      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  Diet: watches minimally     REVIEW OF SYSTEMS:   GENERAL: feels well otherwise  SKIN: denies any unusual skin lesions  EYES:denies blurred vision or double vision  HEENT: denies nasal congestion, sinus pain or ST  LUNGS: denies shortness of breath with exertion  CARDIOVASCULAR: denies chest pain on exertion  GI: denies abdominal pain,denies heartburn  : denies nocturia or changes in stream  MUSCULOSKELETAL: denies back pain  NEURO: denies headaches  PSYCHE: denies depression or anxiety  HEMATOLOGIC: denies hx of anemia  ENDOCRINE: denies thyroid history  ALL/ASTHMA: denies hx of allergy or asthma    EXAM:   /70   Pulse 92   Temp 97.4 °F (36.3 °C) (Temporal)   Resp 18   Ht 6' (1.829 m)   Wt 260 lb (117.9 kg)   BMI 35.26 kg/m²   Body mass index is 35.26 kg/m².   GENERAL: well developed, well nourished,in no apparent distress  SKIN: no rashes,no suspicious lesions  HEENT: atraumatic, normocephalic,ears and throat are clear  EYES:PERRLA,  EOMI,,conjunctiva are clear  NECK: supple,no adenopathy  CHEST: no chest tenderness  BREAST: no dominant or suspicious mass  LUNGS: clear to auscultation  CARDIO: RRR without murmur  GI: good BS's,no masses, HSM or tenderness  : two descended testes,no masses,no hernia,no penile lesions  RECTAL: good rectal tone, prostate is mildly enlarged no masses.    MUSCULOSKELETAL: back is not tender,FROM of the back  EXTREMITIES: no cyanosis, clubbing or edema  NEURO: Oriented times three,cranial nerves are intact,motor and sensory are grossly intact    ASSESSMENT AND PLAN:   Silvio Garner is a 64 year old male who presents for a complete physical exam.  Encounter Diagnoses   Name Primary?   • Physical exam, annual Yes   • Type 2 diabetes mellitus with hyperglycemia, without long-term current use of insulin (HCC)    • Hypercholesterolemia    • Essential hypertension    • Sleep difficulties    • Screen for colon cancer        Orders Placed This Encounter   Procedures   • Comp Metabolic Panel (14)   • Lipid Panel   • Hemoglobin A1C   • Microalb/Creat Ratio, Random Urine   • Occult Blood, Fecal, FIT Immunoassay   • TdaP (Adacel, Boostrix) [52222]       Meds & Refills for this Visit:  Requested Prescriptions      No prescriptions requested or ordered in this encounter   Healthy diet.  Stay active.   Start monitoring her blood pressure at home and bring blood pressure machine for the next visit.  Your blood pressure at home should be less than 140/90.  Do test for occult blood from the stool.  Schedule colonoscopy.  Do fasting blood work 1 week prior visit.    Schedule an appointment for beginning of November 2024..    Imaging & Consults:  GASTRO - INTERNAL  TETANUS, DIPHTHERIA TOXOIDS AND ACELLULAR PERTUSIS VACCINE (TDAP), >7 YEARS, IM USE     Pt's weight is Body mass index is 35.26 kg/m²., recommended low carb diet and aerobic exercise 30 minutes three times weekly. Health maintenance, will check fasting Lipids, CMP, CBC  and PSA. Pt referred for screening colonoscopy. The patient indicates understanding of these issues and agrees to the plan.  The patient is asked to return for CPX in 1 year.  The note was dictated using speech recognition software.  Accuracy and grammar in transcription may be subject to error.

## 2024-08-27 NOTE — PATIENT INSTRUCTIONS
Healthy diet.  Stay active.   Start monitoring her blood pressure at home and bring blood pressure machine for the next visit.  Your blood pressure at home should be less than 140/90.  Do test for occult blood from the stool.  Schedule colonoscopy.  Do fasting blood work 1 week prior visit.    Schedule an appointment for beginning of November 2024..

## 2024-08-28 ENCOUNTER — TELEMEDICINE (OUTPATIENT)
Dept: ORTHOPEDICS CLINIC | Facility: CLINIC | Age: 64
End: 2024-08-28
Payer: COMMERCIAL

## 2024-08-28 DIAGNOSIS — M18.10 ARTHRITIS OF CARPOMETACARPAL (CMC) JOINT OF THUMB: Primary | ICD-10-CM

## 2024-08-28 NOTE — PROGRESS NOTES
SURGICAL BOOKING SHEET   Name: Silvio Garner  MRN: [id number]   : 1960    Surgical Date:   24   Surgical Consent:   Right thumb carpometacarpal joint arthroplasty   Diagnosis:      ICD-10-CM   1. Arthritis of carpometacarpal (CMC) joint of thumb  M18.10       Workers Comp: No      Procedure Codes:   CMC Arthroplasty - 1st Dorsal Compartment Release (00604), APL Tendon Transfer (10562), Trapeziectomy (80653)   Disposition:   Outpatient   Operative Time:   1 hr   Antibiotics:   Ancef 2g   Anesthesia Type:   Monitored Anesthesia Care (MAC) and Regional - Supraclavicular   Clearance:    MEDICAL CLEARANCE, CARDIAC CLEARANCE   Equipment:   CMC Arthroplasty: Arthrex CMC Arthroplasty (3.0 mm biotenodesis screw with 4.0 mm biotenodesis screw on back up + Internal Brace), [place] Blade, Mini C-arm, Small osteotomes/mallet, Small power, Small saw blade   Assistant:   Tanner Assistant: Yes, Yolanda Andino PA-C   Follow Up:   7-14 days post op with Yolanda Andino   Pain Medication:   Norco 325-5mg   Therapy:   TBD

## 2024-08-28 NOTE — PROGRESS NOTES
Clinic Note     Assessment/Plan:  63 year old with     Right thumb CMC osteoarthritis - patient has failed conservative non-surgical management and elected to proceed with surgical treatment  CMC Arthroplasty Consent: Non-surgical and surgical treatment options where reviewed with the patient. Patient elected to proceed with trapiziectomy and APL suspensionplasty. Patient consented to surgery after having understood the risks associated with surgery, expected outcomes, time to recovery and the need for therapy post-operatively. Risks include but not limited to: infection, wound complication, nerve injury resulting in temporary or permanent nerve damage, thumb joint stiffness, persistent pain/symptoms, swelling, and need for additional surgery.   Non-modifiable risk factors: Myasthenia gravis, DM2, Psoriasis   Sx 12/12/24  Left thumb CMC osteoarthritis - continue management with CSI  Left ring finger trigger digit status post 2 corticosteroid injection with no recurrence.    Follow up: 2-3 months, new XR right thumb    Imaging:   XR right thumb: Degenerative changes of CMC joint.      Physical Exam:    Ht 6' (1.829 m)   Wt 250 lb (113.4 kg)   BMI 33.91 kg/m²     Bilateral thumbs: Bilateral thumb CMC joint pain on palpation and with CMC seesaw test.  There is a thumb adduction contracture beginning on the right side      CC: Right base of thumb pain    HPI: This 64 year old left-hand-dominant male presents with pain at the base of the thumb.  He has had pain there for a number of years.  Pain is moderate to severe.  It is worse with use of his hand.  He works as a making .  The pain is described as sharp and constant.  He has tried wearing hand brace.  The pain does not wake him up at night.    Interval history: Patient continues to have pain at the base of his thumbs.    Past Medical History:    Anemia    Diabetes (HCC)    from chronic steriod use    High blood pressure    Lab test positive for  detection of COVID-19 virus    5/2020, subsequent testing negative    Microcytic anemia    Myasthenia gravis (HCC)    7/2020    OTHER DISEASES    Prostate nodule    Psoriasis     Past Surgical History:   Procedure Laterality Date    Cataract Bilateral     Colonoscopy  2009    normal with diverticuli    Colonoscopy,diagnostic  09/09/2009    Performed by DEONTE TYLER at [facility]    Mastoidectomy,simple  11/29/2022    Other surgical history  1978    foot    Other surgical history  2009    prostate biopsy, normal    Upper gi endoscopy,biopsy  09/09/2009    duodenal ulcer     Current Outpatient Medications   Medication Sig Dispense Refill    MYCOPHENOLATE MOFETIL 500 MG Oral Tab TAKE 2 AND 1/2 TABLETS BY MOUTH TWICE DAILY 150 tablet 0    metFORMIN 500 MG Oral Tab Take 1 tablet (500 mg total) by mouth 2 (two) times daily with meals. 60 tablet 0    mometasone furoate 50 MCG/ACT Nasal Suspension 2 sprays daily.      LISINOPRIL 40 MG Oral Tab TAKE 1 TABLET(40 MG) BY MOUTH DAILY 30 tablet 0    TALTZ 80 MG/ML Subcutaneous Solution Auto-injector       temazepam 15 MG Oral Cap Take 1 capsule (15 mg total) by mouth nightly as needed for Sleep. 30 capsule 0    pyridostigmine 60 MG Oral Tab TAKE 1 TABLET BY MOUTH FIVE TIMES DAILY. 450 tablet 3    efgartigimod jamison-fcab (VYVGART) 400 MG/20ML Intravenous Solution injection Inject into the vein As Directed.      PANTOPRAZOLE 40 MG Oral Tab EC TAKE 1 TABLET(40 MG) BY MOUTH TWICE DAILY BEFORE MEALS 180 tablet 1    ferrous sulfate 325 (65 FE) MG Oral Tab EC Take 1 tablet (325 mg total) by mouth daily with breakfast.  0    COSENTYX SENSOREADY, 300 MG, 150 MG/ML Subcutaneous Solution Auto-injector Inject 2 pen as directed every 30 (thirty) days.      Ascorbic Acid (VITAMIN C OR) Take 1,000 mg by mouth daily.      Acidophilus/Pectin Oral Cap Take 1 capsule by mouth daily.      omega-3 fatty acids 1000 MG Oral Cap Take 1,000 mg by mouth daily.      Turmeric 500 MG Oral Cap  Take 1 capsule by mouth every evening.        VITAMIN D 400 UNIT OR CAPS 1 CAPSULE DAILY       No Known Allergies  Family History   Problem Relation Age of Onset    Diabetes Father     Other (brain tumors) Father     Other (uremic poisoning) Paternal Grandfather     Other (malignant hyperthermia) Maternal Cousin Male      Social History     Occupational History    Not on file   Tobacco Use    Smoking status: Former     Current packs/day: 0.00     Average packs/day: 1 pack/day for 20.0 years (20.0 ttl pk-yrs)     Types: Cigarettes     Start date: 1978     Quit date: 1998     Years since quittin.3    Smokeless tobacco: Never   Vaping Use    Vaping status: Never Used   Substance and Sexual Activity    Alcohol use: Yes     Comment: 1-2 per day during the week, 3-4 on weekends    Drug use: No    Sexual activity: Not on file     Wally Hart MD   Hand, Wrist, & Elbow Surgery  [email or web address]  t: [phone]  f: [phone]    Video Telehealth Visit    This clinician is part of the telehealth program and is conducting this visit in a currently approved location. For this visit the clinician and patient were present via interactive audio & video telecommunications system that permits two-way, real-time/synchronous communications. There are limitations of this visit as no hands-on physical examination could be performed. Silvio Garner verbally consents to a Telemedicine Visit on 24. Patient and clinician are currently located in the [facility].    Patient understands and accepts financial responsibility for any deductible, co-insurance and/or co-pays associated with this service.    This billing was spent on history taking; observational physical exam; review of diagnostic testing, medications, and decision making.

## 2024-08-29 ENCOUNTER — TELEPHONE (OUTPATIENT)
Dept: ORTHOPEDICS CLINIC | Facility: CLINIC | Age: 64
End: 2024-08-29

## 2024-08-29 DIAGNOSIS — M18.10 ARTHRITIS OF CARPOMETACARPAL (CMC) JOINT OF THUMB: Primary | ICD-10-CM

## 2024-08-29 NOTE — TELEPHONE ENCOUNTER
Date of Surgery: 24     Post Op Appt: 24 1140AM    Case ID: [id number]    Notes:     URGICAL BOOKING SHEET   Name: Silvio Garner  MRN: [id number]   : 1960     Surgical Date:    24   Surgical Consent:    Right thumb carpometacarpal joint arthroplasty   Diagnosis:         ICD-10-CM   1. Arthritis of carpometacarpal (CMC) joint of thumb  M18.10       Workers Comp: No      Procedure Codes:    CMC Arthroplasty - 1st Dorsal Compartment Release (16008), APL Tendon Transfer (18157), Trapeziectomy (04839)   Disposition:    Outpatient   Operative Time:    1 hr   Antibiotics:    Ancef 2g   Anesthesia Type:    Monitored Anesthesia Care (MAC) and Regional - Supraclavicular   Clearance:     MEDICAL CLEARANCE, CARDIAC CLEARANCE   Equipment:    CMC Arthroplasty: Arthrex CMC Arthroplasty (3.0 mm biotenodesis screw with 4.0 mm biotenodesis screw on back up + Internal Brace), [place] Blade, Mini C-arm, Small osteotomes/mallet, Small power, Small saw blade   Assistant:    Tanner Assistant: Yes, Yolanda Andino PA-C   Follow Up:    7-14 days post op with Yolanda Andino   Pain Medication:    Norco 325-5mg   Therapy:    TBD

## 2024-08-29 NOTE — TELEPHONE ENCOUNTER
Last time medication was refilled 12/5/22  Quantity and number of refills 180 w/ 1  Last OV 8/27/24  Next OV 11/5/24    My chart message sent to patient inquiring about medication.

## 2024-08-30 ENCOUNTER — TELEPHONE (OUTPATIENT)
Dept: FAMILY MEDICINE CLINIC | Facility: CLINIC | Age: 64
End: 2024-08-30

## 2024-08-30 RX ORDER — PANTOPRAZOLE SODIUM 40 MG/1
40 TABLET, DELAYED RELEASE ORAL
Qty: 180 TABLET | Refills: 0 | Status: SHIPPED | OUTPATIENT
Start: 2024-08-30

## 2024-08-30 NOTE — TELEPHONE ENCOUNTER
Pre-op paperwork received for right thumb carpometacarpal joint arthroplasty surgery.    Dos 12/2/2024    Please call pt to schedule an appointment for pre-op physical.    Thank you!

## 2024-08-30 NOTE — TELEPHONE ENCOUNTER
S/P went over surgical protocol and scheduled Post Op Appointment. Patient had no other questions or concerns. I did advise the Patient if they had any additional questions to give us a call back for further assistance. Medical clearance Request sent. Patient doesn't have a cardiologist but he will call when he gets one to give info.

## 2024-09-05 ENCOUNTER — TELEPHONE (OUTPATIENT)
Dept: NEUROLOGY | Facility: CLINIC | Age: 64
End: 2024-09-05

## 2024-09-05 NOTE — TELEPHONE ENCOUNTER
Received order for Vyvgart 1160mg weekly x 4 weeks from [employer]. Placed in provider folder for review.

## 2024-09-06 NOTE — TELEPHONE ENCOUNTER
Received signed order for [employer]. Faxed to [email or web address] with confirmation. Endorsed to scanning.

## 2024-09-08 DIAGNOSIS — I10 ESSENTIAL HYPERTENSION: ICD-10-CM

## 2024-09-09 RX ORDER — LISINOPRIL 40 MG/1
40 TABLET ORAL DAILY
Qty: 90 TABLET | Refills: 0 | Status: SHIPPED | OUTPATIENT
Start: 2024-09-09

## 2024-09-09 NOTE — TELEPHONE ENCOUNTER
LOV: 8/27/2024  for: CPX  Patient advised to RTC on:Beginning of November 2024..     NOV:11/05/2024  Medication Quantity Refills Start End   LISINOPRIL 40 MG Oral Tab 30 tablet 0 8/9/2024 --   Sig:   TAKE 1 TABLET(40 MG) BY MOUTH DAILY

## 2024-09-10 ENCOUNTER — TELEPHONE (OUTPATIENT)
Dept: NEUROLOGY | Facility: CLINIC | Age: 64
End: 2024-09-10

## 2024-09-11 NOTE — TELEPHONE ENCOUNTER
Spoke to patient. Elevated LFT's June 2024 and slightly came down in August 2024. Recommended by PCP to lose weight, but will also decrease Cellcept slightly, from 1250mg BID to 1000/1250mg.

## 2024-09-23 DIAGNOSIS — E11.65 TYPE 2 DIABETES MELLITUS WITH HYPERGLYCEMIA, WITHOUT LONG-TERM CURRENT USE OF INSULIN (HCC): ICD-10-CM

## 2024-09-23 NOTE — TELEPHONE ENCOUNTER
LOV: 8/27/2024  for: CPX  Patient advised to RTC on:November 2024    Nov:11/05/2024    Medication Quantity Refills Start End   metFORMIN 500 MG Oral Tab 60 tablet 0 8/21/2024 --   Sig:   Take 1 tablet (500 mg total) by mouth 2 (two) times daily with meals.

## 2024-09-30 ENCOUNTER — OFFICE VISIT (OUTPATIENT)
Dept: ORTHOPEDICS CLINIC | Facility: CLINIC | Age: 64
End: 2024-09-30
Payer: COMMERCIAL

## 2024-09-30 VITALS — WEIGHT: 260 LBS | BODY MASS INDEX: 35.21 KG/M2 | HEIGHT: 72 IN

## 2024-09-30 DIAGNOSIS — M17.0 PRIMARY OSTEOARTHRITIS OF BOTH KNEES: Primary | ICD-10-CM

## 2024-09-30 PROCEDURE — 3008F BODY MASS INDEX DOCD: CPT | Performed by: PHYSICIAN ASSISTANT

## 2024-09-30 PROCEDURE — 99213 OFFICE O/P EST LOW 20 MIN: CPT | Performed by: PHYSICIAN ASSISTANT

## 2024-09-30 RX ORDER — MELOXICAM 15 MG/1
15 TABLET ORAL DAILY
Qty: 30 TABLET | Refills: 1 | Status: SHIPPED | OUTPATIENT
Start: 2024-09-30

## 2024-09-30 NOTE — PROGRESS NOTES
[facility] Progress Note      Chief Complaint:  Left knee pain      Subjective: Silvio Garner is a 64 year old male who is here for follow-up of his left knee pain.  He was seen approximately 6 weeks ago for his degenerative joint disease and possible flareup from viscosupplementation injection.  He states that over the past 6 weeks, his pain has slowly improved and now today he has no pain.  Pain is typically worse when he gets up from a seated position and starts to walk.  He takes ibuprofen intermittently.  He is here to discuss a cortisone injection today.  Of note he is undergoing surgery by Dr. Hart in December.      Objective: Exam of the left knee and lower extremity reveals that the overlying skin is intact.  No palpable effusion.  Mild patellofemoral crepitus with range of motion.  Full extension and full flexion without pain.  Normal tenderness to palpation.      Assessment: Left knee degenerative joint disease      Plan: Overall he is feeling much better today.  I explained that the viscosupplementation injection may be providing some relief.  We discussed the option of going ahead with a cortisone injection today however since he is better we will hold off until symptoms worsen again.  I did send a prescription for meloxicam to the pharmacy for any pain that recurs over the next week to see if this improves his symptoms.  He will discontinue all other anti-inflammatories while on this medication.  He will follow-up with me with worsening pain, most likely prior to his hand procedure by Dr. Hart for consideration of a cortisone injection.  He will follow-up sooner with any other questions or concerns in the interim        Rasheeda Austin PA-C  Orthopedic Surgery   [address] [place] [id number]   t: [phone]  f: [phone]           This document was partially prepared using Dragon Medical voice recognition software.  Although every attempt is made to correct errors  during dictation, discrepancies may still exist. Please contact me with any questions or clarifications.

## 2024-10-01 ENCOUNTER — PATIENT MESSAGE (OUTPATIENT)
Dept: NEUROLOGY | Facility: CLINIC | Age: 64
End: 2024-10-01

## 2024-10-01 ENCOUNTER — PATIENT MESSAGE (OUTPATIENT)
Dept: FAMILY MEDICINE CLINIC | Facility: CLINIC | Age: 64
End: 2024-10-01

## 2024-10-02 NOTE — TELEPHONE ENCOUNTER
From: Silvio Garner  To: Alma Villafana  Sent: 10/1/2024 7:05 PM CDT  Subject: ophthalmology referral    Hel DR Villafana,  I am switching my eye care to [facility]. They have asked for a referral from you before I can make an appointment . I have attached a copy of their form. Thank you     Silvio Garner

## 2024-10-02 NOTE — TELEPHONE ENCOUNTER
Tried Tylenol extra strength over-the-counter.  Use meloxicam only if the pain gets bad.  Thank you

## 2024-10-02 NOTE — TELEPHONE ENCOUNTER
From: Silvio Garner  To: Dayna Gibson  Sent: 10/1/2024 7:16 PM CDT  Subject: Medication question    Hello DR Nix i have recently an Ortho P.A for arthritis 2 injections this year. She has recommened meloxican once daily for 30 days to hold me over till my next injection. I have a history of two G I bleeds what are your thoughts are on this

## 2024-10-16 ENCOUNTER — APPOINTMENT (OUTPATIENT)
Dept: URBAN - METROPOLITAN AREA CLINIC 244 | Age: 64
Setting detail: DERMATOLOGY
End: 2024-10-17

## 2024-10-16 DIAGNOSIS — L40.0 PSORIASIS VULGARIS: ICD-10-CM

## 2024-10-16 DIAGNOSIS — Z79.899 OTHER LONG TERM (CURRENT) DRUG THERAPY: ICD-10-CM

## 2024-10-16 PROCEDURE — OTHER TALTZ MONITORING: OTHER

## 2024-10-16 PROCEDURE — OTHER ORDER TESTS: OTHER

## 2024-10-16 PROCEDURE — OTHER ADDITIONAL NOTES: OTHER

## 2024-10-16 PROCEDURE — OTHER COUNSELING: OTHER

## 2024-10-16 PROCEDURE — 99214 OFFICE O/P EST MOD 30 MIN: CPT

## 2024-10-16 PROCEDURE — OTHER DIAGNOSIS COMMENT: OTHER

## 2024-10-16 ASSESSMENT — LOCATION DETAILED DESCRIPTION DERM
LOCATION DETAILED: LEFT ELBOW
LOCATION DETAILED: PERIUMBILICAL SKIN
LOCATION DETAILED: RIGHT ELBOW
LOCATION DETAILED: RIGHT KNEE
LOCATION DETAILED: LEFT LATERAL ABDOMEN
LOCATION DETAILED: LEFT KNEE

## 2024-10-16 ASSESSMENT — BSA PSORIASIS: % BODY COVERED IN PSORIASIS: 7

## 2024-10-16 ASSESSMENT — LOCATION SIMPLE DESCRIPTION DERM
LOCATION SIMPLE: RIGHT ELBOW
LOCATION SIMPLE: LEFT KNEE
LOCATION SIMPLE: LEFT ELBOW
LOCATION SIMPLE: ABDOMEN
LOCATION SIMPLE: RIGHT KNEE

## 2024-10-16 ASSESSMENT — LOCATION ZONE DERM
LOCATION ZONE: TRUNK
LOCATION ZONE: LEG
LOCATION ZONE: ARM

## 2024-10-16 ASSESSMENT — PGA PSORIASIS: PGA PSORIASIS 2020: MODERATE

## 2024-10-16 ASSESSMENT — ITCH NUMERIC RATING SCALE: HOW SEVERE IS YOUR ITCHING?: 1

## 2024-10-16 NOTE — PROCEDURE: TALTZ MONITORING
Add High Risk Medication Management Associated Diagnosis?: No
Detail Level: Zone
Initial Quantiferon Gold (Optional): most recent tb negative 12/20/2023

## 2024-10-16 NOTE — PROCEDURE: ORDER TESTS
Performing Laboratory: -[id number]
Expected Date Of Service: 10/16/2024
Billing Type: Third-Party Bill
Bill For Surgical Tray: no

## 2024-10-16 NOTE — PROCEDURE: DIAGNOSIS COMMENT
Render Risk Assessment In Note?: no
Comment: rheum notes his joint discomfort does not appear to be from psoriasis
Detail Level: Simple

## 2024-10-16 NOTE — PROCEDURE: ADDITIONAL NOTES
Additional Notes: Pt was given lab order for Biologic cont
Detail Level: Simple
Render Risk Assessment In Note?: no

## 2024-10-18 DIAGNOSIS — G70.00 MYASTHENIA GRAVIS (HCC): ICD-10-CM

## 2024-10-21 ENCOUNTER — MED REC SCAN ONLY (OUTPATIENT)
Dept: FAMILY MEDICINE CLINIC | Facility: CLINIC | Age: 64
End: 2024-10-21

## 2024-10-22 DIAGNOSIS — G47.9 SLEEP DIFFICULTIES: ICD-10-CM

## 2024-10-22 RX ORDER — MYCOPHENOLATE MOFETIL 500 MG/1
TABLET ORAL
Qty: 120 TABLET | Refills: 0 | Status: SHIPPED | OUTPATIENT
Start: 2024-10-22 | End: 2024-11-25

## 2024-10-22 NOTE — TELEPHONE ENCOUNTER
Recommend further slight decrease in Cellcept, from 1250/1000mg to 1000mg BID, will refill as such. PCP also monitoring for fatty liver. Please instruct patient to get repeat liver enzymes drawn within the next 1-2 weeks. Also please instruct patient to schedule an appointment.

## 2024-10-23 DIAGNOSIS — E11.65 TYPE 2 DIABETES MELLITUS WITH HYPERGLYCEMIA, WITHOUT LONG-TERM CURRENT USE OF INSULIN (HCC): ICD-10-CM

## 2024-10-23 NOTE — TELEPHONE ENCOUNTER
LOV:8/27/2024   for: CPX  Patient advised to RTC on: Beginning of November 2024.  Nov:11/05/2024    Medication Quantity Refills Start End   temazepam 15 MG Oral Cap 30 capsule 0 6/11/2024 --   Sig:   Take 1 capsule (15 mg total) by mouth nightly as needed for Sleep.

## 2024-10-24 RX ORDER — TEMAZEPAM 15 MG/1
15 CAPSULE ORAL NIGHTLY PRN
Qty: 30 CAPSULE | Refills: 0 | Status: SHIPPED | OUTPATIENT
Start: 2024-10-24

## 2024-10-24 NOTE — TELEPHONE ENCOUNTER
Requested Prescriptions     Pending Prescriptions Disp Refills    METFORMIN 500 MG Oral Tab [Pharmacy Med Name: METFORMIN 500MG TABLETS] 60 tablet 1     Sig: TAKE 1 TABLET(500 MG) BY MOUTH TWICE DAILY WITH MEALS     Last refill 9/23/24 #60 x 1  Refill remains on script  Refill denied.

## 2024-11-04 ENCOUNTER — PATIENT MESSAGE (OUTPATIENT)
Dept: ORTHOPEDICS CLINIC | Facility: CLINIC | Age: 64
End: 2024-11-04

## 2024-11-04 ENCOUNTER — PATIENT MESSAGE (OUTPATIENT)
Dept: FAMILY MEDICINE CLINIC | Facility: CLINIC | Age: 64
End: 2024-11-04

## 2024-11-04 ENCOUNTER — HOSPITAL ENCOUNTER (OUTPATIENT)
Dept: GENERAL RADIOLOGY | Age: 64
Discharge: HOME | End: 2024-11-04
Attending: ORTHOPAEDIC SURGERY
Payer: COMMERCIAL

## 2024-11-04 DIAGNOSIS — M79.641 BILATERAL HAND PAIN: ICD-10-CM

## 2024-11-04 DIAGNOSIS — M79.642 BILATERAL HAND PAIN: ICD-10-CM

## 2024-11-04 DIAGNOSIS — M79.642 BILATERAL HAND PAIN: Primary | ICD-10-CM

## 2024-11-04 DIAGNOSIS — M79.641 BILATERAL HAND PAIN: Primary | ICD-10-CM

## 2024-11-04 PROCEDURE — 73130 X-RAY EXAM OF HAND: CPT | Performed by: ORTHOPAEDIC SURGERY

## 2024-11-05 ENCOUNTER — OFFICE VISIT (OUTPATIENT)
Dept: FAMILY MEDICINE CLINIC | Facility: CLINIC | Age: 64
End: 2024-11-05
Payer: COMMERCIAL

## 2024-11-05 ENCOUNTER — PATIENT MESSAGE (OUTPATIENT)
Dept: NEUROLOGY | Facility: CLINIC | Age: 64
End: 2024-11-05

## 2024-11-05 ENCOUNTER — LAB ENCOUNTER (OUTPATIENT)
Dept: LAB | Age: 64
End: 2024-11-05
Attending: FAMILY MEDICINE
Payer: COMMERCIAL

## 2024-11-05 VITALS
TEMPERATURE: 97 F | SYSTOLIC BLOOD PRESSURE: 130 MMHG | HEART RATE: 88 BPM | RESPIRATION RATE: 18 BRPM | DIASTOLIC BLOOD PRESSURE: 62 MMHG | BODY MASS INDEX: 35.62 KG/M2 | HEIGHT: 72 IN | WEIGHT: 263 LBS

## 2024-11-05 DIAGNOSIS — I10 ESSENTIAL HYPERTENSION: Primary | ICD-10-CM

## 2024-11-05 DIAGNOSIS — D64.9 ANEMIA, UNSPECIFIED TYPE: ICD-10-CM

## 2024-11-05 DIAGNOSIS — E11.65 TYPE 2 DIABETES MELLITUS WITH HYPERGLYCEMIA, WITHOUT LONG-TERM CURRENT USE OF INSULIN (HCC): ICD-10-CM

## 2024-11-05 DIAGNOSIS — G70.00 MYASTHENIA GRAVIS (HCC): ICD-10-CM

## 2024-11-05 DIAGNOSIS — R73.9 HYPERGLYCEMIA: ICD-10-CM

## 2024-11-05 DIAGNOSIS — Z87.19 HISTORY OF ESOPHAGEAL ULCER: ICD-10-CM

## 2024-11-05 DIAGNOSIS — E78.00 HYPERCHOLESTEROLEMIA: ICD-10-CM

## 2024-11-05 DIAGNOSIS — R01.1 HEART MURMUR: ICD-10-CM

## 2024-11-05 DIAGNOSIS — K22.70 BARRETT'S ESOPHAGUS WITHOUT DYSPLASIA: ICD-10-CM

## 2024-11-05 DIAGNOSIS — G47.9 SLEEP DIFFICULTIES: ICD-10-CM

## 2024-11-05 LAB
ALBUMIN SERPL-MCNC: 4.7 G/DL (ref 3.2–4.8)
ALBUMIN/GLOB SERPL: 1.8 {RATIO} (ref 1–2)
ALP LIVER SERPL-CCNC: 99 U/L
ALT SERPL-CCNC: 79 U/L
ANION GAP SERPL CALC-SCNC: 9 MMOL/L (ref 0–18)
AST SERPL-CCNC: 98 U/L (ref ?–34)
BASOPHILS # BLD AUTO: 0.09 X10(3) UL (ref 0–0.2)
BASOPHILS NFR BLD AUTO: 1.2 %
BILIRUB DIRECT SERPL-MCNC: 0.4 MG/DL (ref ?–0.3)
BILIRUB SERPL-MCNC: 0.9 MG/DL (ref 0.2–1.1)
BUN BLD-MCNC: 13 MG/DL (ref 9–23)
BUN/CREAT SERPL: 16.3 (ref 10–20)
CALCIUM BLD-MCNC: 10.3 MG/DL (ref 8.7–10.4)
CHLORIDE SERPL-SCNC: 104 MMOL/L (ref 98–112)
CHOLEST SERPL-MCNC: 170 MG/DL (ref ?–200)
CO2 SERPL-SCNC: 27 MMOL/L (ref 21–32)
CREAT BLD-MCNC: 0.8 MG/DL
CREAT UR-SCNC: 165.6 MG/DL
DEPRECATED HBV CORE AB SER IA-ACNC: 9 NG/ML
DEPRECATED RDW RBC AUTO: 43.1 FL (ref 35.1–46.3)
EGFRCR SERPLBLD CKD-EPI 2021: 98 ML/MIN/1.73M2 (ref 60–?)
EOSINOPHIL # BLD AUTO: 0.32 X10(3) UL (ref 0–0.7)
EOSINOPHIL NFR BLD AUTO: 4.4 %
ERYTHROCYTE [DISTWIDTH] IN BLOOD BY AUTOMATED COUNT: 13.2 % (ref 11–15)
EST. AVERAGE GLUCOSE BLD GHB EST-MCNC: 134 MG/DL (ref 68–126)
FASTING PATIENT LIPID ANSWER: YES
FASTING STATUS PATIENT QL REPORTED: YES
GLOBULIN PLAS-MCNC: 2.6 G/DL (ref 2–3.5)
GLUCOSE BLD-MCNC: 106 MG/DL (ref 70–99)
HBA1C MFR BLD: 6.3 % (ref ?–5.7)
HCT VFR BLD AUTO: 32.2 %
HDLC SERPL-MCNC: 34 MG/DL (ref 40–59)
HGB BLD-MCNC: 10.3 G/DL
IMM GRANULOCYTES # BLD AUTO: 0.1 X10(3) UL (ref 0–1)
IMM GRANULOCYTES NFR BLD: 1.4 %
LDLC SERPL CALC-MCNC: 112 MG/DL (ref ?–100)
LYMPHOCYTES # BLD AUTO: 1.29 X10(3) UL (ref 1–4)
LYMPHOCYTES NFR BLD AUTO: 17.8 %
MCH RBC QN AUTO: 28.4 PG (ref 26–34)
MCHC RBC AUTO-ENTMCNC: 32 G/DL (ref 31–37)
MCV RBC AUTO: 88.7 FL
MICROALBUMIN UR-MCNC: 1.1 MG/DL
MICROALBUMIN/CREAT 24H UR-RTO: 6.6 UG/MG (ref ?–30)
MONOCYTES # BLD AUTO: 0.91 X10(3) UL (ref 0.1–1)
MONOCYTES NFR BLD AUTO: 12.6 %
NEUTROPHILS # BLD AUTO: 4.54 X10 (3) UL (ref 1.5–7.7)
NEUTROPHILS # BLD AUTO: 4.54 X10(3) UL (ref 1.5–7.7)
NEUTROPHILS NFR BLD AUTO: 62.6 %
NONHDLC SERPL-MCNC: 136 MG/DL (ref ?–130)
OSMOLALITY SERPL CALC.SUM OF ELEC: 291 MOSM/KG (ref 275–295)
PLATELET # BLD AUTO: 156 10(3)UL (ref 150–450)
POTASSIUM SERPL-SCNC: 4 MMOL/L (ref 3.5–5.1)
PROT SERPL-MCNC: 7.3 G/DL (ref 5.7–8.2)
RBC # BLD AUTO: 3.63 X10(6)UL
SODIUM SERPL-SCNC: 140 MMOL/L (ref 136–145)
TRIGL SERPL-MCNC: 132 MG/DL (ref 30–149)
VLDLC SERPL CALC-MCNC: 23 MG/DL (ref 0–30)
WBC # BLD AUTO: 7.3 X10(3) UL (ref 4–11)

## 2024-11-05 PROCEDURE — 3008F BODY MASS INDEX DOCD: CPT | Performed by: FAMILY MEDICINE

## 2024-11-05 PROCEDURE — 82570 ASSAY OF URINE CREATININE: CPT | Performed by: FAMILY MEDICINE

## 2024-11-05 PROCEDURE — 83036 HEMOGLOBIN GLYCOSYLATED A1C: CPT | Performed by: FAMILY MEDICINE

## 2024-11-05 PROCEDURE — 99214 OFFICE O/P EST MOD 30 MIN: CPT | Performed by: FAMILY MEDICINE

## 2024-11-05 PROCEDURE — 3075F SYST BP GE 130 - 139MM HG: CPT | Performed by: FAMILY MEDICINE

## 2024-11-05 PROCEDURE — 3078F DIAST BP <80 MM HG: CPT | Performed by: FAMILY MEDICINE

## 2024-11-05 PROCEDURE — 82043 UR ALBUMIN QUANTITATIVE: CPT | Performed by: FAMILY MEDICINE

## 2024-11-05 PROCEDURE — 80061 LIPID PANEL: CPT | Performed by: FAMILY MEDICINE

## 2024-11-05 PROCEDURE — G2211 COMPLEX E/M VISIT ADD ON: HCPCS | Performed by: FAMILY MEDICINE

## 2024-11-05 PROCEDURE — 82248 BILIRUBIN DIRECT: CPT | Performed by: OTHER

## 2024-11-05 PROCEDURE — 80053 COMPREHEN METABOLIC PANEL: CPT | Performed by: OTHER

## 2024-11-05 PROCEDURE — 85025 COMPLETE CBC W/AUTO DIFF WBC: CPT | Performed by: OTHER

## 2024-11-05 PROCEDURE — 82728 ASSAY OF FERRITIN: CPT | Performed by: FAMILY MEDICINE

## 2024-11-05 RX ORDER — LISINOPRIL 40 MG/1
40 TABLET ORAL DAILY
Qty: 90 TABLET | Refills: 1 | Status: SHIPPED | OUTPATIENT
Start: 2024-11-05

## 2024-11-05 RX ORDER — FERROUS SULFATE 325(65) MG
325 TABLET, DELAYED RELEASE (ENTERIC COATED) ORAL
Qty: 90 TABLET | Refills: 1 | Status: CANCELLED
Start: 2024-11-05

## 2024-11-05 RX ORDER — TEMAZEPAM 15 MG/1
15 CAPSULE ORAL NIGHTLY PRN
Qty: 30 CAPSULE | Refills: 0 | Status: CANCELLED
Start: 2024-11-05

## 2024-11-05 NOTE — PATIENT INSTRUCTIONS
Do test focal blood from the stool.  Start taking iron supplement ferrous sulfate 325 mg 1 tablet daily with vitamin C 500 mg daily over-the-counter.  Start taking pantoprazole 1 tablet half an hour before breakfast.  Continue current medications.  Monitor blood pressure at home.  Further recommendation pending stool test results.

## 2024-11-06 ENCOUNTER — TELEPHONE (OUTPATIENT)
Dept: FAMILY MEDICINE CLINIC | Facility: CLINIC | Age: 64
End: 2024-11-06

## 2024-11-06 ENCOUNTER — OFFICE VISIT (OUTPATIENT)
Dept: ORTHOPEDICS CLINIC | Facility: CLINIC | Age: 64
End: 2024-11-06
Payer: COMMERCIAL

## 2024-11-06 VITALS — HEIGHT: 72 IN | WEIGHT: 263 LBS | BODY MASS INDEX: 35.62 KG/M2

## 2024-11-06 DIAGNOSIS — M18.12 PRIMARY OSTEOARTHRITIS OF FIRST CARPOMETACARPAL JOINT OF LEFT HAND: Primary | ICD-10-CM

## 2024-11-06 PROCEDURE — 99213 OFFICE O/P EST LOW 20 MIN: CPT | Performed by: PHYSICIAN ASSISTANT

## 2024-11-06 PROCEDURE — 3008F BODY MASS INDEX DOCD: CPT | Performed by: PHYSICIAN ASSISTANT

## 2024-11-06 PROCEDURE — 20600 DRAIN/INJ JOINT/BURSA W/O US: CPT | Performed by: PHYSICIAN ASSISTANT

## 2024-11-06 RX ORDER — BETAMETHASONE SODIUM PHOSPHATE AND BETAMETHASONE ACETATE 3; 3 MG/ML; MG/ML
6 INJECTION, SUSPENSION INTRA-ARTICULAR; INTRALESIONAL; INTRAMUSCULAR; SOFT TISSUE ONCE
Status: COMPLETED | OUTPATIENT
Start: 2024-11-06 | End: 2024-11-06

## 2024-11-06 RX ADMIN — BETAMETHASONE SODIUM PHOSPHATE AND BETAMETHASONE ACETATE 6 MG: 3; 3 INJECTION, SUSPENSION INTRA-ARTICULAR; INTRALESIONAL; INTRAMUSCULAR; SOFT TISSUE at 13:50:00

## 2024-11-06 NOTE — TELEPHONE ENCOUNTER
Pt was seen in office yesterday, 11/5/24, and states was instructed to complete echocardiogram.    No order in system, please place order and notify patient, thank you.

## 2024-11-06 NOTE — PROGRESS NOTES
Silvio Garner is a 64 year old male.  Hypertension, sleeping difficulties, anemia, hyperglycemia, hypercholesterolemia, myasthenia gravis, Sesay's esophagus, history esophageal ulcer   HPI:   Patient is coming for follow-up visit hypertension taking lisinopril doing well with medication.  He has blood pressure machine he is reading higher numbers than we have in office he will try to check blood pressure on new machine.  Asymptomatic denies any chest pain no shortness of breath no palpitations.    Sleeping difficulties patient is using temazepam medication is helping.  He is okay with medication right now he will call for refill as needed.    Patient has anemia on the testing hemoglobin 10.3.  Ferritin level came back lower unable to add iron levels patient stopped taking iron supplement we will have him to go back on iron recheck blood work in January.  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  Trying to watch his diet.  Monitor hemoglobin A1c came back at 6.3.    Hypercholesterolemia monitor blood work , watch diet    Myasthenia gravis patient is seeing neurologist stable on medication, seeing Dr. Sheppard.     Elevated liver function test they are coming down monitor blood work    Patient Sesay's esophagus history of esophageal ulcer-denies any black stools.  Check fit test.  He stopped taking antiacid medication patient has pantoprazole recommended to go back on the medication.      Current Outpatient Medications   Medication Sig Dispense Refill    lisinopril 40 MG Oral Tab Take 1 tablet (40 mg total) by mouth daily. 90 tablet 1    metFORMIN 500 MG Oral Tab Take 1 tablet (500 mg total) by mouth 2 (two) times daily with meals. 180 tablet 1    TEMAZEPAM 15 MG Oral Cap TAKE 1 CAPSULE(15 MG) BY MOUTH EVERY NIGHT AS NEEDED FOR SLEEP 30 capsule 0    Mycophenolate Mofetil 500 MG  Oral Tab TAKE 2 tablets BY MOUTH TWICE DAILY 120 tablet 0    pantoprazole 40 MG Oral Tab EC Take 1 tablet (40 mg total) by mouth 2 (two) times daily before meals. 180 tablet 0    TALTZ 80 MG/ML Subcutaneous Solution Auto-injector       pyridostigmine 60 MG Oral Tab TAKE 1 TABLET BY MOUTH FIVE TIMES DAILY. 450 tablet 3    efgartigimod jamison-fcab (VYVGART) 400 MG/20ML Intravenous Solution injection Inject into the vein As Directed.      ferrous sulfate 325 (65 FE) MG Oral Tab EC Take 1 tablet (325 mg total) by mouth daily with breakfast.  0    COSENTYX SENSOREADY, 300 MG, 150 MG/ML Subcutaneous Solution Auto-injector Inject 2 pen as directed every 30 (thirty) days.      Ascorbic Acid (VITAMIN C OR) Take 1,000 mg by mouth daily.      Acidophilus/Pectin Oral Cap Take 1 capsule by mouth daily.      omega-3 fatty acids 1000 MG Oral Cap Take 1,000 mg by mouth daily.      Turmeric 500 MG Oral Cap Take 1 capsule by mouth every evening.        VITAMIN D 400 UNIT OR CAPS 1 CAPSULE DAILY      Meloxicam 15 MG Oral Tab Take 1 tablet (15 mg total) by mouth daily. 30 tablet 1      Past Medical History:    Anemia    Diabetes (HCC)    from chronic steriod use    High blood pressure    Lab test positive for detection of COVID-19 virus    2020, subsequent testing negative    Microcytic anemia    Myasthenia gravis (HCC)    2020    OTHER DISEASES    Prostate nodule    Psoriasis      Social History:  Social History     Socioeconomic History    Marital status:    Tobacco Use    Smoking status: Former     Current packs/day: 0.00     Average packs/day: 1 pack/day for 20.0 years (20.0 ttl pk-yrs)     Types: Cigarettes     Start date: 1978     Quit date: 1998     Years since quittin.5    Smokeless tobacco: Never   Vaping Use    Vaping status: Never Used   Substance and Sexual Activity    Alcohol use: Yes     Comment: 1-2 per day during the week, 3-4 on weekends    Drug use: No   Other Topics Concern    Caffeine Concern  Yes     Comment: 2cups per day    Exercise No     Comment: not really     Social Drivers of Health     Financial Resource Strain: Low Risk  (7/13/2024)    Received from [facility]    Overall Financial Resource Strain (CARDIA)     Difficulty of Paying Living Expenses: Not hard at all   Food Insecurity: No Food Insecurity (7/13/2024)    Received from [facility]    Hunger Vital Sign     Worried About Running Out of Food in the Last Year: Never true     Ran Out of Food in the Last Year: Never true   Transportation Needs: Unknown (7/13/2024)    Received from [facility]    PRAPARE - Transportation     Lack of Transportation (Non-Medical): No   Housing Stability: Low Risk  (7/13/2024)    Received from [facility]    Housing Stability Vital Sign     Unable to Pay for Housing in the Last Year: No     Number of Places Lived in the Last Year: 1     Unstable Housing in the Last Year: No        REVIEW OF SYSTEMS:   GENERAL HEALTH: feels well otherwise  SKIN: denies any unusual skin lesions or rashes  HEENT no congestion no runny nose no sore throat  Neck no neck pain   RESPIRATORY: denies shortness of breath with exertion  CARDIOVASCULAR: denies chest pain on exertion  GI: denies abdominal pain and denies heartburn  NEURO: denies headaches  Psych normal mood.    EXAM:   /62 (BP Location: Right arm, Patient Position: Sitting, Cuff Size: large)   Pulse 88   Temp 97.2 °F (36.2 °C) (Temporal)   Resp 18   Ht 6' (1.829 m)   Wt 263 lb (119.3 kg)   BMI 35.67 kg/m²   GENERAL: well developed, well nourished,in no apparent distress  SKIN: no rashes,no suspicious lesions  HEENT: atraumatic, normocephalic,ears and throat are clear  NECK: supple,no adenopathy,  LUNGS: clear to auscultation  CARDIO: RRR normal S1-S2, 1 out of 6 mild systolic ejection murmur  GI: good BS's,no masses, HSM or tenderness  EXTREMITIES: no  edema  Psychiatric - alert   and oriented x3, normal mood        Results for orders placed or performed in visit on 11/05/24   CBC W Differential W Platelet    Collection Time: 11/05/24 11:11 AM   Result Value Ref Range    WBC 7.3 4.0 - 11.0 x10(3) uL    RBC 3.63 (L) 3.80 - 5.80 x10(6)uL    HGB 10.3 (L) 13.0 - 17.5 g/dL    HCT 32.2 (L) 39.0 - 53.0 %    MCV 88.7 80.0 - 100.0 fL    MCH 28.4 26.0 - 34.0 pg    MCHC 32.0 31.0 - 37.0 g/dL    RDW-SD 43.1 35.1 - 46.3 fL    RDW 13.2 11.0 - 15.0 %    .0 150.0 - 450.0 10(3)uL    Neutrophil Absolute Prelim 4.54 1.50 - 7.70 x10 (3) uL    Neutrophil Absolute 4.54 1.50 - 7.70 x10(3) uL    Lymphocyte Absolute 1.29 1.00 - 4.00 x10(3) uL    Monocyte Absolute 0.91 0.10 - 1.00 x10(3) uL    Eosinophil Absolute 0.32 0.00 - 0.70 x10(3) uL    Basophil Absolute 0.09 0.00 - 0.20 x10(3) uL    Immature Granulocyte Absolute 0.10 0.00 - 1.00 x10(3) uL    Neutrophil % 62.6 %    Lymphocyte % 17.8 %    Monocyte % 12.6 %    Eosinophil % 4.4 %    Basophil % 1.2 %    Immature Granulocyte % 1.4 %   Comp Metabolic Panel (14)    Collection Time: 11/05/24 11:11 AM   Result Value Ref Range    Glucose 106 (H) 70 - 99 mg/dL    Sodium 140 136 - 145 mmol/L    Potassium 4.0 3.5 - 5.1 mmol/L    Chloride 104 98 - 112 mmol/L    CO2 27.0 21.0 - 32.0 mmol/L    Anion Gap 9 0 - 18 mmol/L    BUN 13 9 - 23 mg/dL    Creatinine 0.80 0.70 - 1.30 mg/dL    BUN/CREA Ratio 16.3 10.0 - 20.0    Calcium, Total 10.3 8.7 - 10.4 mg/dL    Calculated Osmolality 291 275 - 295 mOsm/kg    eGFR-Cr 98 >=60 mL/min/1.73m2    ALT 79 (H) 10 - 49 U/L    AST 98 (H) <34 U/L    Alkaline Phosphatase 99 45 - 117 U/L    Bilirubin, Total 0.9 0.2 - 1.1 mg/dL    Total Protein 7.3 5.7 - 8.2 g/dL    Albumin 4.7 3.2 - 4.8 g/dL    Globulin  2.6 2.0 - 3.5 g/dL    A/G Ratio 1.8 1.0 - 2.0    Patient Fasting for CMP? Yes    Lipid Panel    Collection Time: 11/05/24 11:11 AM   Result Value Ref Range    Cholesterol, Total 170 <200 mg/dL    HDL Cholesterol 34 (L) 40 - 59 mg/dL     Triglycerides 132 30 - 149 mg/dL    LDL Cholesterol 112 (H) <100 mg/dL    VLDL 23 0 - 30 mg/dL    Non HDL Chol 136 (H) <130 mg/dL    Patient Fasting for Lipid? Yes    Hemoglobin A1C    Collection Time: 11/05/24 11:11 AM   Result Value Ref Range    HgbA1C 6.3 (H) <5.7 %    Estimated Average Glucose 134 (H) 68 - 126 mg/dL   Microalb/Creat Ratio, Random Urine    Collection Time: 11/05/24 11:11 AM   Result Value Ref Range    Microalbumin, Urine 1.10 mg/dL    Creatinine Ur Random 165.60 mg/dL    Malb/Cre Calc 6.6 <=30.0 ug/mg   Bilirubin, Direct    Collection Time: 11/05/24 11:11 AM   Result Value Ref Range    Bilirubin, Direct 0.4 (H) <=0.3 mg/dL   Ferritin [E]    Collection Time: 11/05/24 11:11 AM   Result Value Ref Range    Ferritin 9 (L) 50 - 336 ng/mL      ASSESSMENT AND PLAN:     Encounter Diagnoses   Name Primary?    Essential hypertension Yes    Sleep difficulties     Anemia, unspecified type     Hyperglycemia     Hypercholesterolemia     Myasthenia gravis (HCC)     Sesay's esophagus without dysplasia     History of esophageal ulcer     Heart murmur        Orders Placed This Encounter   Procedures    Ferritin [E]    Iron And Tibc [E]       Meds & Refills for this Visit:  Requested Prescriptions     Signed Prescriptions Disp Refills    lisinopril 40 MG Oral Tab 90 tablet 1     Sig: Take 1 tablet (40 mg total) by mouth daily.    metFORMIN 500 MG Oral Tab 180 tablet 1     Sig: Take 1 tablet (500 mg total) by mouth 2 (two) times daily with meals.   Do test focal blood from the stool.  Start taking iron supplement ferrous sulfate 325 mg 1 tablet daily with vitamin C 500 mg daily over-the-counter.  Start taking pantoprazole 1 tablet half an hour before breakfast.  Continue current medications.  Monitor blood pressure at home.  Further recommendation pending stool test results.  Will check echo of the heart.    If test for occult blood from the stool  would come back positive we will have patient to see GI doctor if  test is negative we will do iron replacement 4 to 3 months and recheck blood work again.        Imaging & Consults:  CARD ECHO 2D DOPPLER (CPT=93306)    The patient indicates understanding of these issues and agrees to the plan.  The patient is asked to return in 3 months.  The note was dictated using speech recognition software.  Accuracy and grammar in transcription may be subject to error.

## 2024-11-06 NOTE — PROGRESS NOTES
Clinic Note     Assessment/Plan:  63 year old with     Right thumb CMC osteoarthritis - patient has failed conservative non-surgical management and elected to proceed with surgical treatment  CMC Arthroplasty Consent: Non-surgical and surgical treatment options where reviewed with the patient. Patient elected to proceed with trapiziectomy and APL suspensionplasty. Patient consented to surgery after having understood the risks associated with surgery, expected outcomes, time to recovery and the need for therapy post-operatively. Risks include but not limited to: infection, wound complication, nerve injury resulting in temporary or permanent nerve damage, thumb joint stiffness, persistent pain/symptoms, swelling, and need for additional surgery.   Non-modifiable risk factors: Myasthenia gravis, DM2, Psoriasis   Sx 12/12/24  Left thumb CMC osteoarthritis -patient continues to have pain we discussed repeat injection today.  All of his questions were answered and he tolerated procedure very well.    Injection: Written consent was obtained.  Skin was prepped with ChloraPrep.  1 mL of 6 mg of betamethasone and 1 mL of 1% lidocaine was injected into the left thumb CMC joint.  Patient tolerated the procedure well.  No complications were encountered.  Band-Aid was applied.    Left ring finger trigger digit status post 2 corticosteroid injection with no recurrence.    Follow up: Time of surgery    Imaging:   XR right thumb: Degenerative changes of CMC joint.      Physical Exam:    Ht 6' (1.829 m)   Wt 250 lb (113.4 kg)   BMI 33.91 kg/m²     Bilateral thumbs: Bilateral thumb CMC joint pain on palpation and with CMC seesaw test.  There is a thumb adduction contracture beginning on the right side      CC: Right base of thumb pain    HPI: This 64 year old left-hand-dominant male presents with pain at the base of the thumb.  He has had pain there for a number of years.  Pain is moderate to severe.  It is worse with use of his  hand.  He works as a making .  The pain is described as sharp and constant.  He has tried wearing hand brace.  The pain does not wake him up at night.    Interval history: Patient continues to have pain at the base of his thumbs.    Past Medical History:    Anemia    Diabetes (HCC)    from chronic steriod use    High blood pressure    Lab test positive for detection of COVID-19 virus    5/2020, subsequent testing negative    Microcytic anemia    Myasthenia gravis (HCC)    7/2020    OTHER DISEASES    Prostate nodule    Psoriasis     Past Surgical History:   Procedure Laterality Date    Cataract Bilateral     Colonoscopy  2009    normal with diverticuli    Colonoscopy,diagnostic  09/09/2009    Performed by DEONTE TYLER at [facility] [place], [facility]    Mastoidectomy,simple  11/29/2022    Other surgical history  1978    foot    Other surgical history  2009    prostate biopsy, normal    Upper gi endoscopy,biopsy  09/09/2009    duodenal ulcer     Current Outpatient Medications   Medication Sig Dispense Refill    lisinopril 40 MG Oral Tab Take 1 tablet (40 mg total) by mouth daily. 90 tablet 1    metFORMIN 500 MG Oral Tab Take 1 tablet (500 mg total) by mouth 2 (two) times daily with meals. 180 tablet 1    TEMAZEPAM 15 MG Oral Cap TAKE 1 CAPSULE(15 MG) BY MOUTH EVERY NIGHT AS NEEDED FOR SLEEP 30 capsule 0    Mycophenolate Mofetil 500 MG Oral Tab TAKE 2 tablets BY MOUTH TWICE DAILY 120 tablet 0    Meloxicam 15 MG Oral Tab Take 1 tablet (15 mg total) by mouth daily. 30 tablet 1    pantoprazole 40 MG Oral Tab EC Take 1 tablet (40 mg total) by mouth 2 (two) times daily before meals. 180 tablet 0    TALTZ 80 MG/ML Subcutaneous Solution Auto-injector       pyridostigmine 60 MG Oral Tab TAKE 1 TABLET BY MOUTH FIVE TIMES DAILY. 450 tablet 3    efgartigimod jamison-fcab (VYVGART) 400 MG/20ML Intravenous Solution injection Inject into the vein As Directed.      ferrous sulfate 325 (65 FE) MG Oral Tab EC Take 1 tablet (325 mg  total) by mouth daily with breakfast.  0    COSENTYX SENSOREADY, 300 MG, 150 MG/ML Subcutaneous Solution Auto-injector Inject 2 pen as directed every 30 (thirty) days.      Ascorbic Acid (VITAMIN C OR) Take 1,000 mg by mouth daily.      Acidophilus/Pectin Oral Cap Take 1 capsule by mouth daily.      omega-3 fatty acids 1000 MG Oral Cap Take 1,000 mg by mouth daily.      Turmeric 500 MG Oral Cap Take 1 capsule by mouth every evening.        VITAMIN D 400 UNIT OR CAPS 1 CAPSULE DAILY       No Known Allergies  Family History   Problem Relation Age of Onset    Diabetes Father     Other (brain tumors) Father     Other (uremic poisoning) Paternal Grandfather     Other (malignant hyperthermia) Maternal Cousin Male      Social History     Occupational History    Not on file   Tobacco Use    Smoking status: Former     Current packs/day: 0.00     Average packs/day: 1 pack/day for 20.0 years (20.0 ttl pk-yrs)     Types: Cigarettes     Start date: 1978     Quit date: 1998     Years since quittin.5    Smokeless tobacco: Never   Vaping Use    Vaping status: Never Used   Substance and Sexual Activity    Alcohol use: Yes     Comment: 1-2 per day during the week, 3-4 on weekends    Drug use: No    Sexual activity: Not on file   Yolanda Major PA-C  Hand, Wrist, & Elbow Surgery  Physician Assistant to Dr. Remberto [email or web address]  t: [phone]  f: [phone]

## 2024-11-07 ENCOUNTER — PATIENT MESSAGE (OUTPATIENT)
Dept: ORTHOPEDICS CLINIC | Facility: CLINIC | Age: 64
End: 2024-11-07

## 2024-11-07 NOTE — TELEPHONE ENCOUNTER
Please recommend that patient schedule a follow up appointment for 3-4 months for myasthenia. Strongly recommend to schedule appointments ahead of time.

## 2024-11-07 NOTE — TELEPHONE ENCOUNTER
Called and notified the patient the order for the 2 D echo was placed. I gave him the number to call and schedule. Pt. Agreed to plan and verbalized understanding

## 2024-11-09 PROCEDURE — 82274 ASSAY TEST FOR BLOOD FECAL: CPT | Performed by: FAMILY MEDICINE

## 2024-11-13 ENCOUNTER — TELEPHONE (OUTPATIENT)
Dept: NEUROLOGY | Facility: CLINIC | Age: 64
End: 2024-11-13

## 2024-11-13 ENCOUNTER — MED REC SCAN ONLY (OUTPATIENT)
Dept: NEUROLOGY | Facility: CLINIC | Age: 64
End: 2024-11-13

## 2024-11-13 NOTE — TELEPHONE ENCOUNTER
Received Vyvgart [facility] report form [employer], placed in the nurses bin for review.

## 2024-11-14 ENCOUNTER — TELEPHONE (OUTPATIENT)
Dept: FAMILY MEDICINE CLINIC | Facility: CLINIC | Age: 64
End: 2024-11-14

## 2024-11-14 DIAGNOSIS — G70.00 MYASTHENIA GRAVIS (HCC): ICD-10-CM

## 2024-11-14 DIAGNOSIS — R01.1 HEART MURMUR: Primary | ICD-10-CM

## 2024-11-15 ENCOUNTER — HOSPITAL ENCOUNTER (OUTPATIENT)
Dept: LAB | Facility: HOSPITAL | Age: 64
Discharge: HOME | End: 2024-11-15
Attending: FAMILY MEDICINE
Payer: COMMERCIAL

## 2024-11-15 ENCOUNTER — HOSPITAL ENCOUNTER (OUTPATIENT)
Dept: CV DIAGNOSTICS | Facility: HOSPITAL | Age: 64
Discharge: HOME | End: 2024-11-15
Attending: FAMILY MEDICINE
Payer: COMMERCIAL

## 2024-11-15 ENCOUNTER — TELEPHONE (OUTPATIENT)
Dept: FAMILY MEDICINE CLINIC | Facility: CLINIC | Age: 64
End: 2024-11-15

## 2024-11-15 DIAGNOSIS — G70.00 MYASTHENIA GRAVIS (HCC): ICD-10-CM

## 2024-11-15 DIAGNOSIS — R01.1 HEART MURMUR: ICD-10-CM

## 2024-11-15 DIAGNOSIS — D64.9 ANEMIA, UNSPECIFIED TYPE: ICD-10-CM

## 2024-11-15 LAB
ALBUMIN SERPL-MCNC: 4.7 G/DL (ref 3.2–4.8)
ALBUMIN/GLOB SERPL: 1.9 {RATIO} (ref 1–2)
ALP LIVER SERPL-CCNC: 110 U/L
ALT SERPL-CCNC: 87 U/L
ANION GAP SERPL CALC-SCNC: 6 MMOL/L (ref 0–18)
AST SERPL-CCNC: 87 U/L (ref ?–34)
BASOPHILS # BLD AUTO: 0.08 X10(3) UL (ref 0–0.2)
BASOPHILS NFR BLD AUTO: 1.2 %
BILIRUB SERPL-MCNC: 0.8 MG/DL (ref 0.2–1.1)
BUN BLD-MCNC: 11 MG/DL (ref 9–23)
CALCIUM BLD-MCNC: 10.3 MG/DL (ref 8.7–10.4)
CHLORIDE SERPL-SCNC: 102 MMOL/L (ref 98–112)
CO2 SERPL-SCNC: 31 MMOL/L (ref 21–32)
CREAT BLD-MCNC: 0.85 MG/DL
EGFRCR SERPLBLD CKD-EPI 2021: 96 ML/MIN/1.73M2 (ref 60–?)
EOSINOPHIL # BLD AUTO: 0.29 X10(3) UL (ref 0–0.7)
EOSINOPHIL NFR BLD AUTO: 4.4 %
ERYTHROCYTE [DISTWIDTH] IN BLOOD BY AUTOMATED COUNT: 13.8 %
FASTING STATUS PATIENT QL REPORTED: NO
GLOBULIN PLAS-MCNC: 2.5 G/DL (ref 2–3.5)
GLUCOSE BLD-MCNC: 117 MG/DL (ref 70–99)
HCT VFR BLD AUTO: 33.2 %
HGB BLD-MCNC: 10.3 G/DL
IMM GRANULOCYTES # BLD AUTO: 0.13 X10(3) UL (ref 0–1)
IMM GRANULOCYTES NFR BLD: 2 %
IRON SATN MFR SERPL: 31 %
IRON SERPL-MCNC: 138 UG/DL
LYMPHOCYTES # BLD AUTO: 1.18 X10(3) UL (ref 1–4)
LYMPHOCYTES NFR BLD AUTO: 17.8 %
MCH RBC QN AUTO: 26.8 PG (ref 26–34)
MCHC RBC AUTO-ENTMCNC: 31 G/DL (ref 31–37)
MCV RBC AUTO: 86.5 FL
MONOCYTES # BLD AUTO: 0.87 X10(3) UL (ref 0.1–1)
MONOCYTES NFR BLD AUTO: 13.1 %
NEUTROPHILS # BLD AUTO: 4.08 X10 (3) UL (ref 1.5–7.7)
NEUTROPHILS # BLD AUTO: 4.08 X10(3) UL (ref 1.5–7.7)
NEUTROPHILS NFR BLD AUTO: 61.5 %
OSMOLALITY SERPL CALC.SUM OF ELEC: 288 MOSM/KG (ref 275–295)
PLATELET # BLD AUTO: 161 10(3)UL (ref 150–450)
POTASSIUM SERPL-SCNC: 4.2 MMOL/L (ref 3.5–5.1)
PROT SERPL-MCNC: 7.2 G/DL (ref 5.7–8.2)
RBC # BLD AUTO: 3.84 X10(6)UL
SODIUM SERPL-SCNC: 139 MMOL/L (ref 136–145)
TOTAL IRON BINDING CAPACITY: 446 UG/DL (ref 250–425)
TRANSFERRIN SERPL-MCNC: 346 MG/DL (ref 215–365)
WBC # BLD AUTO: 6.6 X10(3) UL (ref 4–11)

## 2024-11-15 PROCEDURE — 85025 COMPLETE CBC W/AUTO DIFF WBC: CPT | Performed by: STUDENT IN AN ORGANIZED HEALTH CARE EDUCATION/TRAINING PROGRAM

## 2024-11-15 PROCEDURE — 80053 COMPREHEN METABOLIC PANEL: CPT | Performed by: STUDENT IN AN ORGANIZED HEALTH CARE EDUCATION/TRAINING PROGRAM

## 2024-11-15 PROCEDURE — 86480 TB TEST CELL IMMUN MEASURE: CPT | Performed by: STUDENT IN AN ORGANIZED HEALTH CARE EDUCATION/TRAINING PROGRAM

## 2024-11-15 PROCEDURE — 83540 ASSAY OF IRON: CPT

## 2024-11-15 PROCEDURE — 93306 TTE W/DOPPLER COMPLETE: CPT | Performed by: FAMILY MEDICINE

## 2024-11-15 PROCEDURE — 36415 COLL VENOUS BLD VENIPUNCTURE: CPT

## 2024-11-15 PROCEDURE — 83550 IRON BINDING TEST: CPT

## 2024-11-15 NOTE — TELEPHONE ENCOUNTER
Called Dr. Borges's office and received the medical assistant, Almass, voicemail. Left detailed message with reason and patient information to get sooner appointment.     Asked to give our office a call back letting us know if patient can get a sooner appointment, and to call patient himself to inform him, as well.

## 2024-11-15 NOTE — TELEPHONE ENCOUNTER
Patient wasn't able to schedule an appointment with GI till 01/07    Patient needed reassurance if that was okay to wait that long.     Please advise.

## 2024-11-15 NOTE — TELEPHONE ENCOUNTER
Could we call GI office and see if patient could be accommodated sooner because of the positive test from the stool.  Thank you

## 2024-11-18 LAB
M TB IFN-G CD4+ T-CELLS BLD-ACNC: 0.01 IU/ML
M TB TUBERC IFN-G BLD QL: NEGATIVE
M TB TUBERC IGNF/MITOGEN IGNF CONTROL: 1.49 IU/ML
QFT TB1 AG MINUS NIL: -0.01 IU/ML
QFT TB2 AG MINUS NIL: -0.01 IU/ML

## 2024-11-18 NOTE — TELEPHONE ENCOUNTER
Spoke to patient. Informed of iron test results done 11/15 with Dr. Gibson's recommendations.   See Iron and Tibc result note 11/15    Patient verbalized understanding and agreed with plan.

## 2024-11-19 ENCOUNTER — PATIENT MESSAGE (OUTPATIENT)
Dept: FAMILY MEDICINE CLINIC | Facility: CLINIC | Age: 64
End: 2024-11-19

## 2024-11-19 PROBLEM — K76.0 FATTY LIVER: Status: ACTIVE | Noted: 2017-11-13

## 2024-11-19 PROBLEM — K92.1 MELENA: Status: ACTIVE | Noted: 2020-12-17

## 2024-11-19 PROBLEM — K92.2 UPPER GI BLEED: Status: ACTIVE | Noted: 2020-12-17

## 2024-11-19 PROBLEM — H66.92 LEFT CHRONIC OTITIS MEDIA: Status: ACTIVE | Noted: 2024-11-19

## 2024-11-19 PROBLEM — E66.3 OVERWEIGHT WITH BODY MASS INDEX (BMI) 25.0-29.9: Status: ACTIVE | Noted: 2017-10-09

## 2024-11-19 PROBLEM — I10 BENIGN ESSENTIAL HTN: Status: ACTIVE | Noted: 2020-12-17

## 2024-11-19 PROBLEM — E09.9 STEROID-INDUCED DIABETES (HCC): Status: ACTIVE | Noted: 2020-12-17

## 2024-11-19 PROBLEM — K25.9 GASTRIC ULCER: Status: ACTIVE | Noted: 2021-04-12

## 2024-11-19 PROBLEM — D62 ACUTE BLOOD LOSS ANEMIA: Status: ACTIVE | Noted: 2020-12-17

## 2024-11-19 PROBLEM — H53.2 DOUBLE VISION: Status: ACTIVE | Noted: 2020-07-29

## 2024-11-19 PROBLEM — L40.0 PLAQUE PSORIASIS: Status: ACTIVE | Noted: 2017-10-09

## 2024-11-19 PROBLEM — T38.0X5A STEROID-INDUCED DIABETES (HCC): Status: ACTIVE | Noted: 2020-12-17

## 2024-11-19 PROBLEM — R03.0 ELEVATED BLOOD PRESSURE READING WITHOUT DIAGNOSIS OF HYPERTENSION: Status: ACTIVE | Noted: 2017-10-09

## 2024-11-19 PROBLEM — U07.1 CLINICAL DIAGNOSIS OF COVID-19: Status: ACTIVE | Noted: 2020-07-29

## 2024-11-19 PROBLEM — D72.829 LEUKOCYTOSIS: Status: ACTIVE | Noted: 2020-12-07

## 2024-11-25 DIAGNOSIS — G70.00 MYASTHENIA GRAVIS (HCC): ICD-10-CM

## 2024-11-25 RX ORDER — MYCOPHENOLATE MOFETIL 500 MG/1
TABLET ORAL
Qty: 120 TABLET | Refills: 0 | Status: SHIPPED | OUTPATIENT
Start: 2024-11-25

## 2024-11-25 NOTE — TELEPHONE ENCOUNTER
Per telephone encounter 10/18/2024 patient was directed to take 1000 mg two times a day.       Medication: MYCOPHENOLATE MOFETIL 500 MG Oral Tab      Date of last refill: 10/22/2024 (#120/0)  [place]     Last office visit: 06/20/2024  Due back to clinic per last office note:  Around 02/20/2025  Date next office visit scheduled:    Future Appointments   Date Time Provider Department Center   11/26/2024 11:00 AM Dayna Gibson MD [facility] [email or web address]   12/18/2024 11:40 AM Yolanda Major PA [facility] [place]   3/10/2025 11:35 AM Alma Villafana DO [facility] [place]           Last OV note recommendation:    Discussion/Plan:  Elevated LFTs- possible Cellcept related, have nearly normalized on repeat blood draw with Cellcept decrease back to 1250mg BID from 1500mg BID in 2023. However, now are elevated again. Repeat LFT's in 3-4 weeks. If still elevated, will recommend evaluation with Dr. Gibson. Has history of fatty liver.     MG seropositive   Proximal lower extremity fatigue less frequent with Vyvgart  At this time, change Vyvgart once weekly 4 weeks on, 7 weeks off   Continue Cellcept 1250mg BID- check repeat LFT's as above  Continue mestinon 60mg QID  Check CBC/CMP q 3 months

## 2024-11-26 ENCOUNTER — OFFICE VISIT (OUTPATIENT)
Dept: FAMILY MEDICINE CLINIC | Facility: CLINIC | Age: 64
End: 2024-11-26
Payer: COMMERCIAL

## 2024-11-26 VITALS
WEIGHT: 259 LBS | HEART RATE: 74 BPM | SYSTOLIC BLOOD PRESSURE: 130 MMHG | HEIGHT: 74 IN | TEMPERATURE: 97 F | DIASTOLIC BLOOD PRESSURE: 62 MMHG | BODY MASS INDEX: 33.24 KG/M2 | RESPIRATION RATE: 18 BRPM

## 2024-11-26 DIAGNOSIS — R73.9 HYPERGLYCEMIA: ICD-10-CM

## 2024-11-26 DIAGNOSIS — Z01.818 PRE-OP EVALUATION: Primary | ICD-10-CM

## 2024-11-26 DIAGNOSIS — G47.9 SLEEP DIFFICULTIES: ICD-10-CM

## 2024-11-26 DIAGNOSIS — I10 ESSENTIAL HYPERTENSION: ICD-10-CM

## 2024-11-26 DIAGNOSIS — M18.12 PRIMARY OSTEOARTHRITIS OF FIRST CARPOMETACARPAL JOINT OF LEFT HAND: ICD-10-CM

## 2024-11-26 DIAGNOSIS — K22.70 BARRETT'S ESOPHAGUS WITHOUT DYSPLASIA: ICD-10-CM

## 2024-11-26 DIAGNOSIS — D50.9 IRON DEFICIENCY ANEMIA, UNSPECIFIED IRON DEFICIENCY ANEMIA TYPE: ICD-10-CM

## 2024-11-26 DIAGNOSIS — E11.65 TYPE 2 DIABETES MELLITUS WITH HYPERGLYCEMIA, WITHOUT LONG-TERM CURRENT USE OF INSULIN (HCC): ICD-10-CM

## 2024-11-26 DIAGNOSIS — E78.00 HYPERCHOLESTEROLEMIA: ICD-10-CM

## 2024-11-26 DIAGNOSIS — G70.00 MYASTHENIA GRAVIS (HCC): ICD-10-CM

## 2024-11-26 DIAGNOSIS — L40.9 PSORIASIS: ICD-10-CM

## 2024-11-26 PROCEDURE — 99214 OFFICE O/P EST MOD 30 MIN: CPT | Performed by: FAMILY MEDICINE

## 2024-11-26 PROCEDURE — 3061F NEG MICROALBUMINURIA REV: CPT | Performed by: FAMILY MEDICINE

## 2024-11-26 PROCEDURE — 3078F DIAST BP <80 MM HG: CPT | Performed by: FAMILY MEDICINE

## 2024-11-26 PROCEDURE — 3075F SYST BP GE 130 - 139MM HG: CPT | Performed by: FAMILY MEDICINE

## 2024-11-26 PROCEDURE — 3008F BODY MASS INDEX DOCD: CPT | Performed by: FAMILY MEDICINE

## 2024-11-26 PROCEDURE — 3044F HG A1C LEVEL LT 7.0%: CPT | Performed by: FAMILY MEDICINE

## 2024-11-26 NOTE — PROGRESS NOTES
Silvio Garner is a 64 year old male who presents for a pre-operative physical exam. Patient is to have right thumb carpometacarpal joint arthroplasty to be done at [facility] on December 12, 2024 by Dr. Remberto Hart.    HPI:   Pt is here for preop evaluation requested by the surgeon due to history of hypertension , myasthenia gravis, hyperglycemia, iron deficiency anemia, GERD.    Hypertension blood pressure was elevated medication was adjusted ,patient is asymptomatic.    Myasthenia gravis patient seeing neurologist , patient taking medication,  stable.    Iron deficiency anemia -mild , patient has evaluation by GI pending.  .    GERD/Sesay's esophagus- patient is taking pantoprazole,  stable doing well.  Monitor.    Heart murmur -patient had evaluation by cardiologist cleared by them.  Asymptomatic.    Psoriasis is stable,  doing well.    Hyperglycemia - hemoglobin A1c came back elevated 0.3 but stable , patient is taking metformin he will hold medication 24 hours prior to surgery and restart 48 hours after surgery.     Patient denies any chest pain, no shortness of breath ,no palpitations.  No upper respiratory infection symptoms.    Patient denies any problems with anesthesia in the past.  There is family history of malignant hyperthermia.    Current Outpatient Medications   Medication Sig Dispense Refill    Mycophenolate Mofetil 500 MG Oral Tab TAKE 2 tablets BY MOUTH TWICE DAILY. Will review blood work before next refill. 120 tablet 0    lisinopril 40 MG Oral Tab Take 1 tablet (40 mg total) by mouth daily. 90 tablet 1    metFORMIN 500 MG Oral Tab Take 1 tablet (500 mg total) by mouth 2 (two) times daily with meals. 180 tablet 1    TEMAZEPAM 15 MG Oral Cap TAKE 1 CAPSULE(15 MG) BY MOUTH EVERY NIGHT AS NEEDED FOR SLEEP 30 capsule 0    pantoprazole 40 MG Oral Tab EC Take 1 tablet (40 mg total) by mouth 2 (two) times daily before meals. 180 tablet 0    TALTZ 80 MG/ML Subcutaneous Solution  Auto-injector       pyridostigmine 60 MG Oral Tab TAKE 1 TABLET BY MOUTH FIVE TIMES DAILY. 450 tablet 3    efgartigimod jamison-fcab (VYVGART) 400 MG/20ML Intravenous Solution injection Inject into the vein As Directed. EVERY 8 WEEK, 4 MORE SESSIONS LEFT      ferrous sulfate 325 (65 FE) MG Oral Tab EC Take 1 tablet (325 mg total) by mouth daily with breakfast.  0    Ascorbic Acid (VITAMIN C OR) Take 500 mg by mouth daily.      Acidophilus/Pectin Oral Cap Take 1 capsule by mouth daily.      omega-3 fatty acids 1000 MG Oral Cap Take 1,000 mg by mouth daily.      Turmeric 500 MG Oral Cap Take 1 capsule by mouth every evening.        VITAMIN D 400 UNIT OR CAPS 1 CAPSULE DAILY        Allergies: No Known Allergies   Past Medical History:    Anemia    Diabetes (HCC)    from chronic steriod use    Family history of malignant hyperthermia    COUSIN HAD HISTORY    Flatulence/gas pain/belching    Heartburn    High blood pressure    History of blood transfusion    NO REACTION    Lab test positive for detection of COVID-19 virus    5/2020, subsequent testing negative    Microcytic anemia    Myasthenia gravis (HCC)    7/2020    OTHER DISEASES    Prostate nodule    Psoriasis    Sleep apnea    CPAP      Past Surgical History:   Procedure Laterality Date    Cataract Bilateral     Colonoscopy  2009    normal with diverticuli    Colonoscopy,diagnostic  09/09/2009    Performed by DEONTE TYLER at [facility]    Mastoidectomy,simple  11/29/2022    Other surgical history  1978    foot    Other surgical history  2009    prostate biopsy, normal    Upper gi endoscopy,biopsy  09/09/2009    duodenal ulcer      Family History   Problem Relation Age of Onset    Diabetes Father     Other (brain tumors) Father     Other (uremic poisoning) Paternal Grandfather     Other (malignant hyperthermia) Maternal Cousin Male       Social History:   Social History     Socioeconomic History    Marital status:    Tobacco Use    Smoking status:  Former     Current packs/day: 0.00     Average packs/day: 1 pack/day for 20.0 years (20.0 ttl pk-yrs)     Types: Cigarettes     Start date: 1978     Quit date: 1998     Years since quittin.6    Smokeless tobacco: Never   Vaping Use    Vaping status: Never Used   Substance and Sexual Activity    Alcohol use: Yes     Alcohol/week: 8.0 standard drinks of alcohol     Types: 8 Standard drinks or equivalent per week     Comment: 1-2 per day during the week, 3-4 on weekends    Drug use: No   Other Topics Concern    Caffeine Concern Yes     Comment: 2cups per day    Exercise No     Comment: not really     Social Drivers of Health     Financial Resource Strain: Low Risk  (2024)    Received from [facility]    Overall Financial Resource Strain (CARDIA)     Difficulty of Paying Living Expenses: Not hard at all   Food Insecurity: No Food Insecurity (2024)    Received from [facility]    Hunger Vital Sign     Worried About Running Out of Food in the Last Year: Never true     Ran Out of Food in the Last Year: Never true   Transportation Needs: Unknown (2024)    Received from [facility]    PRAPARE - Transportation     Lack of Transportation (Non-Medical): No   Housing Stability: Low Risk  (2024)    Received from [facility]    Housing Stability Vital Sign     Unable to Pay for Housing in the Last Year: No     Number of Places Lived in the Last Year: 1     Unstable Housing in the Last Year: No        REVIEW OF SYSTEMS:   GENERAL: feels well otherwise  SKIN: denies any unusual skin lesions  EYES:denies blurred vision or double vision  HEENT: denies nasal congestion, sinus pain or ST  LUNGS: denies shortness of breath with exertion  CARDIOVASCULAR: denies chest pain on exertion  GI: denies abdominal pain,denies heartburn  : denies dysuria,   MUSCULOSKELETAL: denies back pain, right thumb pain  NEURO: denies  headaches  PSYCHE: denies depression or anxiety  HEMATOLOGIC: denies hx of anemia  ENDOCRINE: denies thyroid history  ALL/ASTHMA: denies hx of allergy or asthma    EXAM:   /62 (BP Location: Left arm, Patient Position: Sitting, Cuff Size: adult)   Pulse 74   Temp 97.2 °F (36.2 °C) (Temporal)   Resp 18   Ht 6' 2\" (1.88 m)   Wt 259 lb (117.5 kg)   BMI 33.25 kg/m²   GENERAL: well developed, well nourished,in no apparent distress  SKIN: no rashes,no suspicious lesions  HEENT: atraumatic, normocephalic,ears and throat are clear  EYES:PERRLA, EOMI, conjunctiva are clear  NECK: supple,no adenopathy,  CHEST: no chest tenderness  BREAST: Deferred  LUNGS: clear to auscultation  CARDIO: RRR , mild heart murmur, normal S1-S2   GI: good BS's,no masses, HSM or tenderness  : deferred  RECTAL: Deferred  MUSCULOSKELETAL: back is not tender,FROM of the back  EXTREMITIES: no cyanosis, clubbing or edema  NEURO: Oriented times three,cranial nerves are intact,motor and sensory are grossly intact    Results for orders placed or performed during the hospital encounter of 11/15/24   CBC With Differential With Platelet    Collection Time: 11/15/24 11:31 AM   Result Value Ref Range    WBC 6.6 4.0 - 11.0 x10(3) uL    RBC 3.84 3.80 - 5.80 x10(6)uL    HGB 10.3 (L) 13.0 - 17.5 g/dL    HCT 33.2 (L) 39.0 - 53.0 %    .0 150.0 - 450.0 10(3)uL    MCV 86.5 80.0 - 100.0 fL    MCH 26.8 26.0 - 34.0 pg    MCHC 31.0 31.0 - 37.0 g/dL    RDW 13.8 %    Neutrophil Absolute Prelim 4.08 1.50 - 7.70 x10 (3) uL    Neutrophil Absolute 4.08 1.50 - 7.70 x10(3) uL    Lymphocyte Absolute 1.18 1.00 - 4.00 x10(3) uL    Monocyte Absolute 0.87 0.10 - 1.00 x10(3) uL    Eosinophil Absolute 0.29 0.00 - 0.70 x10(3) uL    Basophil Absolute 0.08 0.00 - 0.20 x10(3) uL    Immature Granulocyte Absolute 0.13 0.00 - 1.00 x10(3) uL    Neutrophil % 61.5 %    Lymphocyte % 17.8 %    Monocyte % 13.1 %    Eosinophil % 4.4 %    Basophil % 1.2 %    Immature Granulocyte %  2.0 %   Comp Metabolic Panel (14)    Collection Time: 11/15/24 11:31 AM   Result Value Ref Range    Glucose 117 (H) 70 - 99 mg/dL    Sodium 139 136 - 145 mmol/L    Potassium 4.2 3.5 - 5.1 mmol/L    Chloride 102 98 - 112 mmol/L    CO2 31.0 21.0 - 32.0 mmol/L    Anion Gap 6 0 - 18 mmol/L    BUN 11 9 - 23 mg/dL    Creatinine 0.85 0.70 - 1.30 mg/dL    Calcium, Total 10.3 8.7 - 10.4 mg/dL    Calculated Osmolality 288 275 - 295 mOsm/kg    eGFR-Cr 96 >=60 mL/min/1.73m2    AST 87 (H) <34 U/L    ALT 87 (H) 10 - 49 U/L    Alkaline Phosphatase 110 45 - 117 U/L    Bilirubin, Total 0.8 0.2 - 1.1 mg/dL    Total Protein 7.2 5.7 - 8.2 g/dL    Albumin 4.7 3.2 - 4.8 g/dL    Globulin  2.5 2.0 - 3.5 g/dL    A/G Ratio 1.9 1.0 - 2.0    Patient Fasting for CMP? No    Quantiferon TB Plus    Collection Time: 11/15/24 11:31 AM   Result Value Ref Range    Quantiferon TB NIL 0.01 IU/mL    Quantiferon TB1 minus NIL -0.01 IU/mL    Quantiferon TB2 minus NIL -0.01 IU/mL    Quantiferon TB Mitogen minus NIL 1.49 IU/mL    Quantiferon TB Result Negative Negative   Iron And Tibc [E]    Collection Time: 11/15/24 11:31 AM   Result Value Ref Range    Iron 138 65 - 175 ug/dL    Transferrin 346 215 - 365 mg/dL    Total Iron Binding Capacity 446 (H) 250 - 425 ug/dL    % Saturation 31 20 - 50 %     EKG done by cardiologist.    ASSESSMENT AND PLAN:   Silvio Garner is a 64 year old male who presents for a pre-operative physical exam.  Encounter Diagnoses   Name Primary?    Pre-op evaluation Yes    Primary osteoarthritis of first carpometacarpal joint of left hand     Myasthenia gravis (HCC)     Essential hypertension     Sleep difficulties     Type 2 diabetes mellitus with hyperglycemia, without long-term current use of insulin (HCC)     Sesay's esophagus without dysplasia     Hyperglycemia     Hypercholesterolemia     Iron deficiency anemia, unspecified iron deficiency anemia type     Psoriasis        Patient is in a satisfactory condition and  acceptable risk for planned surgery and anesthesia.    Patient was cleared prior surgery by his cardiologist.    No orders of the defined types were placed in this encounter.      Meds & Refills for this Visit:  Requested Prescriptions      No prescriptions requested or ordered in this encounter   Stop Metformin 24 hours prior to surgery and restart 48 hours after surgery.  Continue current meds.   Keep good hydration.   DO NOT take Aspirin, Advil, Ibuprofen, Aleve , Motrin for 1 week prior surgery.   Take Tylenol  as needed for pain.     Imaging & Consults:  None     This consult was sent back the referring physician, Dr. Remberto Hart.    Thank you very much for consultation.    The note was dictated using speech recognition software.  Accuracy and grammar in transcription may be subject to error.

## 2024-11-26 NOTE — PATIENT INSTRUCTIONS
Stop Metformin 24 hours prior to surgery and restart 48 hours after surgery.  Continue current meds.   Keep good hydration.   DO NOT take Aspirin, Advil, Ibuprofen, Aleve , Motrin for 1 week prior surgery.   Take Tylenol  as needed for pain.

## 2024-11-27 ENCOUNTER — TELEPHONE (OUTPATIENT)
Dept: FAMILY MEDICINE CLINIC | Facility: CLINIC | Age: 64
End: 2024-11-27

## 2024-11-27 NOTE — TELEPHONE ENCOUNTER
Patient signed medical records request form in office to receive records from [facility]. Request form faxed to [phone] on 11/26/24.    Request form also sent to scanning to be scanned to patient chart.

## 2024-11-29 RX ORDER — PANTOPRAZOLE SODIUM 40 MG/1
40 TABLET, DELAYED RELEASE ORAL
Qty: 180 TABLET | Refills: 0 | Status: SHIPPED | OUTPATIENT
Start: 2024-11-29

## 2024-11-29 NOTE — TELEPHONE ENCOUNTER
Did not pass protocol    Last office visit 11/5  Last refill was:  pantoprazole 40 MG Oral Tab  [id number]/2024--Sig - Route: Take 1 tablet (40 mg total) by mouth 2 (two) times daily before meals. - OralSent to pharmacy as: Pantoprazole Sodium 40 MG Oral Tablet Delayed Release (Protonix)E-Prescribing Status: Receipt confirmed by pharmacy (8/30/2024  9:01 AM CDT)     Next appointment: none    Please sign pended medication if appropriate

## 2024-12-02 ENCOUNTER — TELEPHONE (OUTPATIENT)
Dept: NEUROLOGY | Facility: CLINIC | Age: 64
End: 2024-12-02

## 2024-12-02 NOTE — TELEPHONE ENCOUNTER
Received Vyvgart renewal orders from [employer].    Placed in providers folder for review and signature.

## 2024-12-03 ENCOUNTER — MED REC SCAN ONLY (OUTPATIENT)
Dept: FAMILY MEDICINE CLINIC | Facility: CLINIC | Age: 64
End: 2024-12-03

## 2024-12-05 ENCOUNTER — TELEPHONE (OUTPATIENT)
Dept: ORTHOPEDICS CLINIC | Facility: CLINIC | Age: 64
End: 2024-12-05

## 2024-12-05 NOTE — TELEPHONE ENCOUNTER
Received Vyvgart renewal orders from [employer].  Faxed to [phone] with confirmation    Placed in scanning

## 2024-12-06 NOTE — TELEPHONE ENCOUNTER
Patient is calling asking if he need to hold on Vyvgart after his thumb surgery on 12/12/24. Please advice

## 2024-12-06 NOTE — TELEPHONE ENCOUNTER
Depends when he is due for next Vyvgart. If right after his surgery, would delay Vyvgart by 1-2 weeks. If due for Vyvgart right before surgery, such as few days before surgery, or up to a week before surgery, would delay until after surgery.

## 2024-12-09 ENCOUNTER — TELEPHONE (OUTPATIENT)
Dept: NEUROLOGY | Facility: CLINIC | Age: 64
End: 2024-12-09

## 2024-12-09 NOTE — TELEPHONE ENCOUNTER
Patient advised and verbalized understanding.     Patient also stated his is getting new insurance as of 1/1/25 and will let us know new insurance information

## 2024-12-09 NOTE — TELEPHONE ENCOUNTER
Spoke with Keshia and informed her that orders were faxed on 12/5/2024.  Keshia reports she found signed orders and has not further needs.

## 2024-12-11 NOTE — TELEPHONE ENCOUNTER
Patient called to check the status of Family Medical Leave Act paperwork. Located forms in originals and logged them for processing. Patient states forms are needed by 12/20/24.     Type of Leave: Family Medical Leave Act   Reason for Leave: Surgery (Right thumb)    Start date of leave: 12/12/2024  End date of leave: 4 weeks   How many flare ups per month/length?:  How many appts per month/length?:   Was Fee and Turnaround info Given?: Yes

## 2024-12-12 ENCOUNTER — APPOINTMENT (OUTPATIENT)
Dept: GENERAL RADIOLOGY | Facility: HOSPITAL | Age: 64
End: 2024-12-12
Attending: ORTHOPAEDIC SURGERY
Payer: COMMERCIAL

## 2024-12-12 ENCOUNTER — HOSPITAL ENCOUNTER (OUTPATIENT)
Facility: HOSPITAL | Age: 64
Setting detail: HOSPITAL OUTPATIENT SURGERY
Discharge: HOME | End: 2024-12-12
Attending: ORTHOPAEDIC SURGERY | Admitting: ORTHOPAEDIC SURGERY
Payer: COMMERCIAL

## 2024-12-12 ENCOUNTER — ANESTHESIA (OUTPATIENT)
Dept: SURGERY | Facility: HOSPITAL | Age: 64
End: 2024-12-12
Payer: COMMERCIAL

## 2024-12-12 ENCOUNTER — ANESTHESIA EVENT (OUTPATIENT)
Dept: SURGERY | Facility: HOSPITAL | Age: 64
End: 2024-12-12
Payer: COMMERCIAL

## 2024-12-12 VITALS
WEIGHT: 260 LBS | TEMPERATURE: 97 F | OXYGEN SATURATION: 97 % | RESPIRATION RATE: 22 BRPM | SYSTOLIC BLOOD PRESSURE: 129 MMHG | HEART RATE: 67 BPM | HEIGHT: 72 IN | BODY MASS INDEX: 35.21 KG/M2 | DIASTOLIC BLOOD PRESSURE: 66 MMHG

## 2024-12-12 DIAGNOSIS — M18.12 PRIMARY OSTEOARTHRITIS OF FIRST CARPOMETACARPAL JOINT OF LEFT HAND: Primary | ICD-10-CM

## 2024-12-12 DIAGNOSIS — G89.18 POST-OP PAIN: ICD-10-CM

## 2024-12-12 LAB — GLUCOSE BLD-MCNC: 174 MG/DL (ref 70–99)

## 2024-12-12 PROCEDURE — 76942 ECHO GUIDE FOR BIOPSY: CPT | Performed by: ANESTHESIOLOGY

## 2024-12-12 PROCEDURE — 0LS50ZZ REPOSITION RIGHT LOWER ARM AND WRIST TENDON, OPEN APPROACH: ICD-10-PCS | Performed by: ORTHOPAEDIC SURGERY

## 2024-12-12 PROCEDURE — 76000 FLUOROSCOPY <1 HR PHYS/QHP: CPT | Performed by: ORTHOPAEDIC SURGERY

## 2024-12-12 PROCEDURE — 0PBM0ZZ EXCISION OF RIGHT CARPAL, OPEN APPROACH: ICD-10-PCS | Performed by: ORTHOPAEDIC SURGERY

## 2024-12-12 PROCEDURE — 82962 GLUCOSE BLOOD TEST: CPT

## 2024-12-12 DEVICE — BIO-COMP-TENOD SCRW W/DISP DRV 4.0X10MM
Type: IMPLANTABLE DEVICE | Site: HAND | Status: FUNCTIONAL
Brand: ARTHREX®

## 2024-12-12 RX ORDER — BUPIVACAINE HYDROCHLORIDE 5 MG/ML
INJECTION, SOLUTION EPIDURAL; INTRACAUDAL AS NEEDED
Status: DISCONTINUED | OUTPATIENT
Start: 2024-12-12 | End: 2024-12-12 | Stop reason: HOSPADM

## 2024-12-12 RX ORDER — NALOXONE HYDROCHLORIDE 0.4 MG/ML
0.08 INJECTION, SOLUTION INTRAMUSCULAR; INTRAVENOUS; SUBCUTANEOUS AS NEEDED
Status: DISCONTINUED | OUTPATIENT
Start: 2024-12-12 | End: 2024-12-12

## 2024-12-12 RX ORDER — ACETAMINOPHEN 500 MG
1000 TABLET ORAL ONCE
Status: DISCONTINUED | OUTPATIENT
Start: 2024-12-12 | End: 2024-12-12 | Stop reason: HOSPADM

## 2024-12-12 RX ORDER — DEXTROSE MONOHYDRATE 25 G/50ML
50 INJECTION, SOLUTION INTRAVENOUS
Status: DISCONTINUED | OUTPATIENT
Start: 2024-12-12 | End: 2024-12-12 | Stop reason: HOSPADM

## 2024-12-12 RX ORDER — HYDROMORPHONE HYDROCHLORIDE 1 MG/ML
0.6 INJECTION, SOLUTION INTRAMUSCULAR; INTRAVENOUS; SUBCUTANEOUS EVERY 5 MIN PRN
Status: DISCONTINUED | OUTPATIENT
Start: 2024-12-12 | End: 2024-12-12

## 2024-12-12 RX ORDER — NICOTINE POLACRILEX 4 MG
30 LOZENGE BUCCAL
Status: DISCONTINUED | OUTPATIENT
Start: 2024-12-12 | End: 2024-12-12 | Stop reason: HOSPADM

## 2024-12-12 RX ORDER — HYDROMORPHONE HYDROCHLORIDE 1 MG/ML
0.4 INJECTION, SOLUTION INTRAMUSCULAR; INTRAVENOUS; SUBCUTANEOUS EVERY 5 MIN PRN
Status: DISCONTINUED | OUTPATIENT
Start: 2024-12-12 | End: 2024-12-12

## 2024-12-12 RX ORDER — HYDROCODONE BITARTRATE AND ACETAMINOPHEN 5; 325 MG/1; MG/1
2 TABLET ORAL ONCE AS NEEDED
Status: DISCONTINUED | OUTPATIENT
Start: 2024-12-12 | End: 2024-12-12

## 2024-12-12 RX ORDER — MIDAZOLAM HYDROCHLORIDE 1 MG/ML
INJECTION INTRAMUSCULAR; INTRAVENOUS AS NEEDED
Status: DISCONTINUED | OUTPATIENT
Start: 2024-12-12 | End: 2024-12-12 | Stop reason: SURG

## 2024-12-12 RX ORDER — LIDOCAINE HYDROCHLORIDE 10 MG/ML
INJECTION, SOLUTION INFILTRATION; PERINEURAL AS NEEDED
Status: DISCONTINUED | OUTPATIENT
Start: 2024-12-12 | End: 2024-12-12 | Stop reason: HOSPADM

## 2024-12-12 RX ORDER — HYDROCODONE BITARTRATE AND ACETAMINOPHEN 5; 325 MG/1; MG/1
1 TABLET ORAL ONCE AS NEEDED
Status: DISCONTINUED | OUTPATIENT
Start: 2024-12-12 | End: 2024-12-12

## 2024-12-12 RX ORDER — NICOTINE POLACRILEX 4 MG
15 LOZENGE BUCCAL
Status: DISCONTINUED | OUTPATIENT
Start: 2024-12-12 | End: 2024-12-12 | Stop reason: HOSPADM

## 2024-12-12 RX ORDER — HYDROMORPHONE HYDROCHLORIDE 1 MG/ML
0.2 INJECTION, SOLUTION INTRAMUSCULAR; INTRAVENOUS; SUBCUTANEOUS EVERY 5 MIN PRN
Status: DISCONTINUED | OUTPATIENT
Start: 2024-12-12 | End: 2024-12-12

## 2024-12-12 RX ORDER — SODIUM CHLORIDE, SODIUM LACTATE, POTASSIUM CHLORIDE, CALCIUM CHLORIDE 600; 310; 30; 20 MG/100ML; MG/100ML; MG/100ML; MG/100ML
INJECTION, SOLUTION INTRAVENOUS CONTINUOUS
Status: DISCONTINUED | OUTPATIENT
Start: 2024-12-12 | End: 2024-12-12

## 2024-12-12 RX ORDER — KETOROLAC TROMETHAMINE 30 MG/ML
INJECTION, SOLUTION INTRAMUSCULAR; INTRAVENOUS AS NEEDED
Status: DISCONTINUED | OUTPATIENT
Start: 2024-12-12 | End: 2024-12-12 | Stop reason: SURG

## 2024-12-12 RX ORDER — HYDROCODONE BITARTRATE AND ACETAMINOPHEN 5; 325 MG/1; MG/1
1 TABLET ORAL EVERY 6 HOURS PRN
Qty: 30 TABLET | Refills: 0 | Status: SHIPPED | OUTPATIENT
Start: 2024-12-12

## 2024-12-12 RX ORDER — ACETAMINOPHEN 500 MG
1000 TABLET ORAL ONCE AS NEEDED
Status: DISCONTINUED | OUTPATIENT
Start: 2024-12-12 | End: 2024-12-12

## 2024-12-12 RX ORDER — METOCLOPRAMIDE HYDROCHLORIDE 5 MG/ML
10 INJECTION INTRAMUSCULAR; INTRAVENOUS EVERY 8 HOURS PRN
Status: DISCONTINUED | OUTPATIENT
Start: 2024-12-12 | End: 2024-12-12

## 2024-12-12 RX ORDER — LABETALOL HYDROCHLORIDE 5 MG/ML
5 INJECTION, SOLUTION INTRAVENOUS EVERY 5 MIN PRN
Status: DISCONTINUED | OUTPATIENT
Start: 2024-12-12 | End: 2024-12-12

## 2024-12-12 RX ORDER — ONDANSETRON 2 MG/ML
4 INJECTION INTRAMUSCULAR; INTRAVENOUS EVERY 6 HOURS PRN
Status: DISCONTINUED | OUTPATIENT
Start: 2024-12-12 | End: 2024-12-12

## 2024-12-12 RX ORDER — KETAMINE HYDROCHLORIDE 50 MG/ML
INJECTION, SOLUTION INTRAMUSCULAR; INTRAVENOUS AS NEEDED
Status: DISCONTINUED | OUTPATIENT
Start: 2024-12-12 | End: 2024-12-12 | Stop reason: SURG

## 2024-12-12 RX ADMIN — KETAMINE HYDROCHLORIDE 25 MG: 50 INJECTION, SOLUTION INTRAMUSCULAR; INTRAVENOUS at 07:36:00

## 2024-12-12 RX ADMIN — KETAMINE HYDROCHLORIDE 25 MG: 50 INJECTION, SOLUTION INTRAMUSCULAR; INTRAVENOUS at 07:20:00

## 2024-12-12 RX ADMIN — MIDAZOLAM HYDROCHLORIDE 2 MG: 1 INJECTION INTRAMUSCULAR; INTRAVENOUS at 07:16:00

## 2024-12-12 RX ADMIN — KETOROLAC TROMETHAMINE 10 MG: 30 INJECTION, SOLUTION INTRAMUSCULAR; INTRAVENOUS at 09:27:00

## 2024-12-12 NOTE — ANESTHESIA PROCEDURE NOTES
Regional Block    Date/Time: 12/12/2024 7:20 AM    Performed by: Fernando Sesay DO  Authorized by: Fernando Sesay DO      General Information and Staff    Start Time:  12/12/2024 7:20 AM  End Time:  12/12/2024 7:24 AM  Anesthesiologist:  Fernando Sesay DO  Performed by:  Anesthesiologist  Patient Location:  OR    Block Placement: Post Induction  Site Identification: real time ultrasound guided and image stored and retrievable    Block site/laterality marked before start: site marked  Reason for Block: at surgeon's request and post-op pain management    Preanesthetic Checklist: 2 patient identifers, IV checked, risks and benefits discussed, monitors and equipment checked, pre-op evaluation, timeout performed, anesthesia consent, sterile technique used, no prohibitive neurological deficits and no local skin infection at insertion site      Procedure Details    Patient Position:  Supine  Prep: ChloraPrep    Monitoring:  Cardiac monitor, continuous pulse ox, blood pressure cuff and heart rate  Block Type:  Axillary  Laterality:  Right  Injection Technique:  Single-shot    Needle    Needle Type:  Short-bevel and echogenic  Needle Gauge:  21 G  Needle Length:  90 mm  Needle Localization:  Ultrasound guidance  Reason for Ultrasound Use: appropriate spread of the medication was noted in real time and no ultrasound evidence of intravascular and/or intraneural injection            Assessment    Injection Assessment:  Good spread noted, negative resistance, negative aspiration for heme, incremental injection and low pressure  Heart Rate Change: No    - Patient tolerated block procedure well without evidence of immediate block related complications.     Medications  12/12/2024 7:20 AM      Additional Comments    Medication:  Bupivacaine 0.375% 25mL with pf decadron 2mg, emphasis on radial nerve

## 2024-12-12 NOTE — H&P
Clinic Note     Assessment/Plan:  63 year old with     Right thumb CMC osteoarthritis - patient has failed conservative non-surgical management and elected to proceed with surgical treatment  CMC Arthroplasty Consent: Non-surgical and surgical treatment options where reviewed with the patient. Patient elected to proceed with trapiziectomy and APL suspensionplasty. Patient consented to surgery after having understood the risks associated with surgery, expected outcomes, time to recovery and the need for therapy post-operatively. Risks include but not limited to: infection, wound complication, nerve injury resulting in temporary or permanent nerve damage, thumb joint stiffness, persistent pain/symptoms, swelling, and need for additional surgery.   Non-modifiable risk factors: Myasthenia gravis, DM2, Psoriasis   Sx 12/12/24  Left thumb CMC osteoarthritis -patient continues to have pain we discussed repeat injection today.  All of his questions were answered and he tolerated procedure very well.    Injection: Written consent was obtained.  Skin was prepped with ChloraPrep.  1 mL of 6 mg of betamethasone and 1 mL of 1% lidocaine was injected into the left thumb CMC joint.  Patient tolerated the procedure well.  No complications were encountered.  Band-Aid was applied.    Left ring finger trigger digit status post 2 corticosteroid injection with no recurrence.    Follow up: Time of surgery    Imaging:   XR right thumb: Degenerative changes of CMC joint.      Physical Exam:    Ht 6' (1.829 m)   Wt 250 lb (113.4 kg)   BMI 33.91 kg/m²     Bilateral thumbs: Bilateral thumb CMC joint pain on palpation and with CMC seesaw test.  There is a thumb adduction contracture beginning on the right side      CC: Right base of thumb pain    HPI: This 64 year old left-hand-dominant male presents with pain at the base of the thumb.  He has had pain there for a number of years.  Pain is moderate to severe.  It is worse with use of his  hand.  He works as a making .  The pain is described as sharp and constant.  He has tried wearing hand brace.  The pain does not wake him up at night.    Interval history: Patient continues to have pain at the base of his thumbs.    Past Medical History:    Anemia    Diabetes (HCC)    from chronic steriod use    Family history of malignant hyperthermia    COUSIN HAD HISTORY    Flatulence/gas pain/belching    Heartburn    High blood pressure    History of blood transfusion    NO REACTION    Lab test positive for detection of COVID-19 virus    2020, subsequent testing negative    Microcytic anemia    Myasthenia gravis (HCC)    2020    OTHER DISEASES    Prostate nodule    Psoriasis    Sleep apnea    CPAP     Past Surgical History:   Procedure Laterality Date    Cataract Bilateral     Colonoscopy      normal with diverticuli    Colonoscopy,diagnostic  2009    Performed by DEONTE TYLER at [facility] [place], [facility]    Mastoidectomy,simple  2022    Other surgical history      foot    Other surgical history      prostate biopsy, normal    Upper gi endoscopy,biopsy  2009    duodenal ulcer     No current outpatient medications on file.     No Known Allergies  Family History   Problem Relation Age of Onset    Diabetes Father     Other (brain tumors) Father     Other (uremic poisoning) Paternal Grandfather     Other (malignant hyperthermia) Maternal Cousin Male      Social History     Occupational History    Not on file   Tobacco Use    Smoking status: Former     Current packs/day: 0.00     Average packs/day: 1 pack/day for 20.0 years (20.0 ttl pk-yrs)     Types: Cigarettes     Start date: 1978     Quit date: 1998     Years since quittin.6    Smokeless tobacco: Never   Vaping Use    Vaping status: Never Used   Substance and Sexual Activity    Alcohol use: Yes     Alcohol/week: 8.0 standard drinks of alcohol     Types: 8 Standard drinks or equivalent per week     Comment:  1-2 per day during the week, 3-4 on weekends    Drug use: No    Sexual activity: Not on file   Yolanda Major PA-C  Hand, Wrist, & Elbow Surgery  Physician Assistant to Dr. Remberto [email or web address]  t: [phone]  f: [phone]

## 2024-12-12 NOTE — ANESTHESIA POSTPROCEDURE EVALUATION
[facility]    Silvio Garner Patient Status:  Hospital Outpatient Surgery   Age/Gender 64 year old male MRN [id number]   Location [facility] SURGERY Attending Remberto Hart MD   Hosp Day # 0 PCP Dayna Gibson MD       Anesthesia Post-op Note    Right thumb carpometacarpal joint arthroplasty    Procedure Summary       Date: 12/12/24 Room / Location:  MAIN OR  /  MAIN OR    Anesthesia Start: 0715 Anesthesia Stop: 0951    Procedure: Right thumb carpometacarpal joint arthroplasty (Right: Hand) Diagnosis:       Arthritis of carpometacarpal (CMC) joint of thumb      (Arthritis of carpometacarpal (CMC) joint of thumb [M18.10])    Surgeons: Remberto Hart MD Anesthesiologist: Fernando Sesay DO    Anesthesia Type: general ASA Status: 3            Anesthesia Type: general    Vitals Value Taken Time   /81 12/12/24 0951   Temp 97 °F (36.1 °C) 12/12/24 0951   Pulse 70 12/12/24 0951   Resp 22 12/12/24 0951   SpO2 95 % 12/12/24 0951       Patient Location: Same Day Surgery    Anesthesia Type: general    Airway Patency: patent    Postop Pain Control: adequate    Mental Status: preanesthetic baseline    Nausea/Vomiting: none    Cardiopulmonary/Hydration status: stable euvolemic    Complications: no apparent anesthesia related complications    Postop vital signs: stable    Comments: Alert no pain    Dental Exam: Unchanged from Preop    Patient to be discharged home when criteria met.

## 2024-12-12 NOTE — TELEPHONE ENCOUNTER
Dr. Hart      Please sign off on form if you agree to: Family Medical Leave Act   (place your signature on the first page only)    -From your Inbasket, Highlight the patient and click Chart   -Double click the 12/05/2024 Forms Completion telephone encounter  -Scroll down to the Media section   -Click the blue Hyperlink: Family Medical Leave Act Dr Hart 12/12/2024  -Click Acknowledge located in the top right ribbon/menu   -Drag the mouse into the blank space of the document and a + sign will appear. Left click to   electronically sign the document.     Thank you,    Morris'

## 2024-12-12 NOTE — OPERATIVE REPORT
Operative Report     Patient Name: Silvio Garner    DOS: 12/12/2024    Preoperative Diagnosis:     Right thumb CMC arthritis    Postoperative Diagnosis:     Right thumb CMC arthritis    Surgeons and Role:     * Remberto Hart MD - Primary     Assistant: PA: Yolanda Major PA    A SA/PA was needed for the successful completion of this case. They were essential for the proper positioning of patient, manipulation of instruments, proper exposure, manipulation of soft tissue and/or fracture fragments, and wound closure.     Procedures:     1.  Right wrist trapeziectomy (CPT 50951)  2.  Right thumb APL suspensionplasty with internal brace (CPT 62360)  3.  Right first dorsal compartment release (CPT 69066)    Antibiotics: Ancef 3g    Surgical Findings: CMC Arthritis, normal STT    Anesthesia: Regional + Local    Complications: None    Implants: Arthrex Internal Brace w/ 4.0 biotenodesis screw    Implant Name Type Inv. Item Serial No.  Lot No. LRB No. Used Action   SCREW INTFR L10MM DIA4MM BIOCOMPOSITE W/ DISP - SNA  SCREW INTFR L10MM DIA4MM BIOCOMPOSITE W/ DISP NA [employer]  [id number] Right 1 Implanted       Specimen: None    Condition: Stable    Estimated Blood Loss: 5 mL    Indications:  64 year old male with thumb CMC arthritis that failed non surgical management. The risks associated with the procedure, expected  outcome, the expected time to recovery, and the rehab required were reviewed. Patient consented having understood all of the above. All of the patient's questions were answered.        Procedure:  Patient was met in the preoperative holding area where consent was verified, laterality was marked with the surgeon's initials, and the H&P was updated. Patient was brought to the operating room on a transport cart.  Patient was transferred from the transport cart onto the operating room table and placed in a supine position.  An upper arm tourniquet was placed.  The arm was prepped and  draped in the usual sterile fashion.  A surgical timeout was performed.  Antibiotics were fully infused.  The limb was elevated and exsanguinated using Esmarch bandage.  Tourniquet was raised to 250 mmHg.     A longitudinal incision was made from the base of the metacarpal to the radial styloid.  A 15 blade was used to make this incision through the dermis.  Adson's and Serrano scissors was used to dissect down to the joint capsule of the CMC joint.  Sensory branches were protected and retracted either volarly or dorsally.  Any crossing veins were cauterized with bipolar cautery.  A 25-gauge hypodermic needle was used to identify the CMC joint.  This was confirmed under mini C arm.  The branch of radial artery was dissected and protected during the case.  [place] blade was used to create volar and dorsal capsular flaps.  The trapezium was identified and the remaining capsular attachments were released using combination of Ducor and [place] blade.  Once the attachments were released a rongeur was used remove the trapezium.  Mini C arm was used to confirm complete excision of the trapezium.  And then identified the APL and EPB tendons attaching to the base of the first metacarpal.  The radial most APL tendon was noted to attached to the base of the first metacarpal and was the appropriate size used for the suspensioplasty.  This was dissected out and followed proximally into the first dorsal compartment.  The first dorsal compartment was released after soft tissue was dissected off of the tendon sheath.  The compartment was released on the dorsal aspect to prevent subluxation of the remaining APL and EPB tendons.  The radial most APL tendon was then divided longitudinal and truncated proximally.  2-0 Fiberloop suture was used to tack the end of the APL tendon. A small 3.5 mm transverse drill hole was created just distal to the APL insertion where the fiber tape and APL tendon was passed through an interosseous fashion.    I then placed a guidewire into the base of the second metacarpal.  Trajectory was confirmed under mini C arm.  I used a cannulated drill bit to drill the hole for the bio-tenodesis screw.  A bicortical drill hole was made.  A suture passer was placed through the drill hole and out a counter incision over the ulnar aspect of index metacarpal.  The 2 limbs of suture tape were then with the APL tendon were twisted around each other, passed through the capsule, and placed into the suture passer.  This as a unit was passed through the drill hole.  The fiber tape and APL tendon was appropriately tensioned and a Bio-Tenodesis screw was then seated.  The thumb was checked for appropriate range of motion.  The anchor resisted strong axial loading of the thumb and maintained appropriate tension of the APL/fiber tape.  The wound was irrigated with sterile normal saline.       Closure:  The capsule was closed with 0 vicryl. 4-0 moncryl was used to reapproximate the skin edges.  5-0 Monocryl was used to close the subcuticular skin.  Exofilm and Steri-Strips were applied. ,     Dressing/Splint:  4 x 4 gauze was held in place with sterile web roll.  A thumb spica splint was applied and held in place with an Ace wrap. Patient's arm was placed in a sling.      Post Operative:  Patient was woken up from anesthesia and taken to PACU for further recovery and discharge home.    Remberto Hart MD   Hand, Wrist, & Elbow Surgery  [email or web address]  t: [phone]  f: [phone]

## 2024-12-12 NOTE — DISCHARGE INSTRUCTIONS
What to expect after a thumb CMC arthroplasty       Immediately after your surgery, keep your hand elevated (fingers up pointing to ceiling) as much as possible for the first two-seven days. Icing is good too, but not as important as elevation. It is normal for your fingers to swell and have discoloration (bruising) appear a couple days after surgery into your fingers or elbow. You should begin using your hand for light activities - writing, typing, dressing and feeding yourself immediately. Move your fingers and make a fist ten times every hour while awake. Try not to move your thumb, but its ok to move the tip of the thumb (which is left free from bandage). Refrain from lifting greater than 2 pounds for the first 2 weeks after your surgery to allow the surgical site to heal completely.    Pain level is variable after this procedure and most patients average a 5-7 out of 10 on a pain scale for the first day or two. It is recommended to take a dose of pain medication BEFORE you feel pain (before the block wears off). We advise you take the pain medication with food. After the numbness from the anesthetic completely wears off and your pain seems minimal, take the prescribed narcotic as needed. If you have minimal pain and feel you need something for your discomfort you may take an over the counter anti-inflammatory (ie: Aleve, Motrin, Ibuprofen) for your pain as long as you don’t have any contraindications.It is safe to take those medications with the narcotic (Norco) that you were prescribed. Please do not take any over the counter tylenol at the same time as the prescription Norco as this already has tylenol in it. Please choose either over the counter tylenol OR norco, depending on the pain level.     Your dressing needs to stay in place and dry until your visit with occupational therapist. Please do not let the dressing get wet. You should cover it with a bag when you take a shower so it does not get wet.      Expect a call from the occupational therapy department to set up an appointment with an OT for aboout 5 days after surgery to begin motion and get the correct splint. You should ideally see the OT before the first post op visit with us. If you have not heard from them within 48 hours of surgery you can reach them at [phone] for [place] locations or [phone] for [place] locations.    5-7 days after Surgery (visit with OT)    You will see the OT before the visit with us. Your postoperative dressing will be removed and the therapist will fabricate a splint and instruct you on a home therapy program. You will be able to remove the splint for showering and sedentary activity and gentle range of motion. Depending on your recovery and how well your fingers are moving, they may recommend seeing them 2 times a week for formal sessions.    1-2 weeks after surgery (first post op visit)  You will see Yolanda Major PA-C or Dr Hart at your post op visit approximately 7-14 days after surgery.An appointment was made for you but if you do not have an appointment or that time does not work please call the office. An x-ray will be taken and the clinician will see you after to check in on your recovery.     6 Weeks after Surgery (Second postoperative visit)    X-rays will be taken again. If everything looks great, you will start to wean out of the splint and begin light strengthening. Formal therapy may start at this point if needed, the provider will discuss this with you at the visit.      12 Weeks after Surgery (Third postoperative visit)    X-rays will be taken again. Depending on recovery this may be the last visit. At this point there are no restrictions with weight or activity and the patient can progress as tolerated.     Please keep in mind, it can take up to 6-9 months to completely recover from this surgery (no pain, full range of motion, full strength).      Please call with any questions or concerns  [phone].      You received a drug called Toradol which is an Anti Inflammatory at: 9:30 am   If you are allowed to take Anti inflammatories:    Do not take any Anti Inflammatory like Motrin, Aleve or Ibuprophen until after: 3:30 pm   Please report any suspected allergic reactions or bleeding issues to your doctor

## 2024-12-12 NOTE — ANESTHESIA PREPROCEDURE EVALUATION
PRE-OP EVALUATION    Patient Name: Silvio Garner    Admit Diagnosis: Arthritis of carpometacarpal (CMC) joint of thumb [M18.10]    Pre-op Diagnosis: Arthritis of carpometacarpal (CMC) joint of thumb [M18.10]    Right thumb carpometacarpal joint arthroplasty    Anesthesia Procedure: Right thumb carpometacarpal joint arthroplasty (Right)    Surgeons and Role:     * Remberto Hart MD - Primary    Pre-op vitals reviewed.  Temp: 98.3 °F (36.8 °C)  Pulse: 80  Resp: 16  BP: 141/80  SpO2: 100 %  Body mass index is 33.38 kg/m².    Current medications reviewed.  Hospital Medications:   glucose (Dex4) 15 GM/59ML oral liquid 15 g  15 g Oral Q15 Min PRN    Or    glucose (Glutose) 40% oral gel 15 g  15 g Oral Q15 Min PRN    Or    glucose-vitamin C (Dex-4) chewable tab 4 tablet  4 tablet Oral Q15 Min PRN    Or    dextrose 50% injection 50 mL  50 mL Intravenous Q15 Min PRN    Or    glucose (Dex4) 15 GM/59ML oral liquid 30 g  30 g Oral Q15 Min PRN    Or    glucose (Glutose) 40% oral gel 30 g  30 g Oral Q15 Min PRN    Or    glucose-vitamin C (Dex-4) chewable tab 8 tablet  8 tablet Oral Q15 Min PRN    lactated ringers infusion   Intravenous Continuous    acetaminophen (Tylenol Extra Strength) tab 1,000 mg  1,000 mg Oral Once    ceFAZolin (Ancef) 2g in 10mL IV syringe premix  2 g Intravenous Once       Outpatient Medications:   Prescriptions Prior to Admission[1]    Allergies: Patient has no known allergies.      Anesthesia Evaluation    Patient summary reviewed.    Anesthetic Complications  (+) history of anesthetic complications  History of: malignant hyperthermia       GI/Hepatic/Renal                (+) liver disease                 Cardiovascular      ECG reviewed.  Exercise tolerance: good     MET: >4    (+) obesity  (+) hypertension                                     Endo/Other      (+) diabetes  type 2, not using insulin      (+) anemia                   Pulmonary                    (+) sleep apnea and noncompliant       Neuro/Psych  Comment: myasthenia               (+) neuromuscular disease           8 drinks/week reported          Past Surgical History:   Procedure Laterality Date    Cataract Bilateral     Colonoscopy      normal with diverticuli    Colonoscopy,diagnostic  2009    Performed by DEONTE TYLER at [facility]    Mastoidectomy,simple  2022    Other surgical history      foot    Other surgical history      prostate biopsy, normal    Upper gi endoscopy,biopsy  2009    duodenal ulcer     Social History     Socioeconomic History    Marital status:    Tobacco Use    Smoking status: Former     Current packs/day: 0.00     Average packs/day: 1 pack/day for 20.0 years (20.0 ttl pk-yrs)     Types: Cigarettes     Start date: 1978     Quit date: 1998     Years since quittin.6    Smokeless tobacco: Never   Vaping Use    Vaping status: Never Used   Substance and Sexual Activity    Alcohol use: Yes     Alcohol/week: 8.0 standard drinks of alcohol     Types: 8 Standard drinks or equivalent per week     Comment: 1-2 per day during the week, 3-4 on weekends    Drug use: No   Other Topics Concern    Caffeine Concern Yes     Comment: 2cups per day    Exercise No     Comment: not really     History   Drug Use No     Available pre-op labs reviewed.  Lab Results   Component Value Date    WBC 6.6 11/15/2024    RBC 3.84 11/15/2024    HGB 10.3 (L) 11/15/2024    HCT 33.2 (L) 11/15/2024    MCV 86.5 11/15/2024    MCH 26.8 11/15/2024    MCHC 31.0 11/15/2024    RDW 13.8 11/15/2024    .0 11/15/2024     Lab Results   Component Value Date     11/15/2024    K 4.2 11/15/2024     11/15/2024    CO2 31.0 11/15/2024    BUN 11 11/15/2024    CREATSERUM 0.85 11/15/2024     (H) 11/15/2024    CA 10.3 11/15/2024            Airway      Mallampati: II  Mouth opening: >3 FB  TM distance: > 6 cm  Neck ROM: full Cardiovascular      Rhythm: regular  Rate: normal      Dental             Pulmonary    Pulmonary exam normal.  Breath sounds clear to auscultation bilaterally.      (+) decreased breath sounds         Other findings              ASA: 3   Plan: general  NPO status verified and     Post-procedure pain management plan discussed with surgeon and patient.  Surgeon requests: regional block  Comment: Discussed including block for post op pain, GA with tiva, MH precautions taken including flushing of anesthesia machine  Plan/risks discussed with: patient and spouse                Present on Admission:  **None**             [1]   Facility-Administered Medications Prior to Admission   Medication Dose Route Frequency Provider Last Rate Last Admin    [COMPLETED] betamethasone sodium phosphate & acetate (Celestone) 6 (3-3) MG/ML injection 6 mg  6 mg Intra-articular Once Yolanda Major PA   6 mg at 11/06/24 1350    triamcinolone acetonide (Kenalog-40) 40 MG/ML injection 40 mg  40 mg Intra-articular Once Renetta Mack MD        triamcinolone acetonide (Kenalog-40) 40 MG/ML injection 40 mg  40 mg Intra-articular Once Renetta Mack MD         Medications Prior to Admission   Medication Sig Dispense Refill Last Dose/Taking    PANTOPRAZOLE 40 MG Oral Tab EC TAKE 1 TABLET(40 MG) BY MOUTH TWICE DAILY BEFORE MEALS 180 tablet 0 12/11/2024 Evening    Mycophenolate Mofetil 500 MG Oral Tab TAKE 2 tablets BY MOUTH TWICE DAILY. Will review blood work before next refill. 120 tablet 0 12/11/2024 Evening    lisinopril 40 MG Oral Tab Take 1 tablet (40 mg total) by mouth daily. 90 tablet 1 12/11/2024 Morning    metFORMIN 500 MG Oral Tab Take 1 tablet (500 mg total) by mouth 2 (two) times daily with meals. 180 tablet 1 12/11/2024 Evening    pyridostigmine 60 MG Oral Tab TAKE 1 TABLET BY MOUTH FIVE TIMES DAILY. 450 tablet 3 12/11/2024 Evening    ferrous sulfate 325 (65 FE) MG Oral Tab EC Take 1 tablet (325 mg total) by mouth daily with breakfast.  0 12/11/2024 Morning    Ascorbic Acid (VITAMIN C OR)  Take 500 mg by mouth daily.   2024 Morning    Acidophilus/Pectin Oral Cap Take 1 capsule by mouth daily.   2024 Evening    VITAMIN D 400 UNIT OR CAPS 1 CAPSULE DAILY   Past Month    [] Na Sulfate-K Sulfate-Mg Sulf (SUPREP BOWEL PREP KIT) 17.5-3.13-1.6 GM/177ML Oral Solution Take 354 mL by mouth one time for 1 dose. 1 each 0     TEMAZEPAM 15 MG Oral Cap TAKE 1 CAPSULE(15 MG) BY MOUTH EVERY NIGHT AS NEEDED FOR SLEEP 30 capsule 0 More than a month    [] mometasone furoate 50 MCG/ACT Nasal Suspension 2 sprays daily.       TALTZ 80 MG/ML Subcutaneous Solution Auto-injector    More than a month    efgartigimod jamison-fcab (VYVGART) 400 MG/20ML Intravenous Solution injection Inject into the vein As Directed. EVERY 8 WEEK, 4 MORE SESSIONS LEFT   More than a month    omega-3 fatty acids 1000 MG Oral Cap Take 1,000 mg by mouth daily.   More than a month    Turmeric 500 MG Oral Cap Take 1 capsule by mouth every evening.     More than a month

## 2024-12-16 ENCOUNTER — TELEPHONE (OUTPATIENT)
Dept: PHYSICAL THERAPY | Facility: HOSPITAL | Age: 64
End: 2024-12-16

## 2024-12-16 ENCOUNTER — TELEPHONE (OUTPATIENT)
Dept: ORTHOPEDICS CLINIC | Facility: CLINIC | Age: 64
End: 2024-12-16

## 2024-12-16 ENCOUNTER — OFFICE VISIT (OUTPATIENT)
Dept: PHYSICAL THERAPY | Age: 64
End: 2024-12-16
Attending: PHYSICIAN ASSISTANT
Payer: COMMERCIAL

## 2024-12-16 DIAGNOSIS — M18.12 PRIMARY OSTEOARTHRITIS OF FIRST CARPOMETACARPAL JOINT OF LEFT HAND: Primary | ICD-10-CM

## 2024-12-16 PROCEDURE — 97760 ORTHOTIC MGMT&TRAING 1ST ENC: CPT

## 2024-12-16 NOTE — TELEPHONE ENCOUNTER
Forms completed and faxed to [employer] at [phone]. Awaiting confirmation. [employer] message sent to patient.

## 2024-12-16 NOTE — PROGRESS NOTES
SPLINT  EVALUATION:     Diagnosis:   Right thumb carpometacarpal joint arthroplasty       Referring Provider: Jose Otto  Date of Evaluation:    12/16/2024    Precautions:  None Next MD visit:   none scheduled  Date of Surgery: 12/12/24         Order Date:  12/12/2024  Authorizing Provider:   JOSE OTTO     Procedure:  OP REFERRAL TO [facility] [[id number]]         Order #:   [id number]  Qty:  1     Priority:  Critical                   Class:   IHP - RFL     Standing Interval:          Standing Occurrences:          Expires on:            Expected by:    Associated DX:  Primary osteoarthritis of first carpometacarpal joint of left hand (M18.12)     Order summary:  IHP - RFL, Critical, 8 visits  Occupational  Therapy Area of Concentration: Orthopedic  Occupational Therapy Ortho: UE  If the patient can be seen sooner with a PT vs an OT, can we cancel this order and replace with a PT order? No         Comments:  Post op: please schedule in 5-7 days  Please fabricate a thumb spica forearm based splint, Ip free  Ok for gentle thumb motion  1-2x/week for 6 weeks, if needed  PATIENT SUMMARY   Silvio Garner is a 64 year old male who presents to therapy today with complaints of decreased function.    Pt describes pain level: current 3/10, best 3/10, worst 6-8/10.  Current functional limitations include using mouse, typing, gripping, carrying items, opening jars/bottles.   Hand dominance: Left.    Silvio describes prior level of function independent.   Past medical history was reviewed with Silvio . Significant findings include OA, DM, myanthsis gravis    ASSESSMENT  Silvio presents to occupational therapy evaluation with primary c/o decreased function. Pt and OT discussed evaluation findings, pt reports splint fits comfortably, and reports understanding for wearing schedule. Skilled Occupational Therapy is medically necessary to address the above impairments and reach functional  goals.    OBJECTIVE   Observation: steri-strips present    Extremity: Right    Today's Treatment:   Splint Fabricated: thumb spica, IP free, forearm based  Splint Wearing Schedule: at all times, remove for shower and exercises  Purpose of Splint: Protective    Charges: Orthotic Mgnt and Train x 3  Total Timed Treatment: 45 min     Total Treatment Time: 45 min   PLAN OF CARE:    Goals: (to be met in 3 visits)  Pt will demonstrate independence with don/doff splint.  Pt will verbalize understanding of splint precautions and instructions for splint care.  OT eval and tx    Frequency / Duration: 1-3    Education or treatment limitation: None  Rehab Potential:good    Patient/Family/Caregiver was advised of these findings, precautions, and treatment options and has agreed to actively participate in planning and for this course of care.    Thank you for your referral. Please co-sign or sign and return this letter via fax as soon as possible to [phone]. If you have any questions, please contact me at Dept: [phone]    Sincerely,  Electronically signed by therapist: SHELBIE Castañeda/L  Physician's certification required: Yes  I certify the need for these services furnished under this plan of treatment and while under my care.    X___________________________________________________ Date____________________    Certification From: 12/16/2024  To:3/16/2025

## 2024-12-17 ENCOUNTER — PATIENT MESSAGE (OUTPATIENT)
Dept: ORTHOPEDICS CLINIC | Facility: CLINIC | Age: 64
End: 2024-12-17

## 2024-12-17 DIAGNOSIS — M18.12 PRIMARY OSTEOARTHRITIS OF FIRST CARPOMETACARPAL JOINT OF LEFT HAND: Primary | ICD-10-CM

## 2024-12-18 ENCOUNTER — HOSPITAL ENCOUNTER (OUTPATIENT)
Dept: GENERAL RADIOLOGY | Age: 64
Discharge: HOME | End: 2024-12-18
Attending: PHYSICIAN ASSISTANT
Payer: COMMERCIAL

## 2024-12-18 ENCOUNTER — OFFICE VISIT (OUTPATIENT)
Dept: ORTHOPEDICS CLINIC | Facility: CLINIC | Age: 64
End: 2024-12-18
Payer: COMMERCIAL

## 2024-12-18 VITALS — HEIGHT: 72 IN | WEIGHT: 206 LBS | BODY MASS INDEX: 27.9 KG/M2

## 2024-12-18 DIAGNOSIS — M18.12 PRIMARY OSTEOARTHRITIS OF FIRST CARPOMETACARPAL JOINT OF LEFT HAND: Primary | ICD-10-CM

## 2024-12-18 DIAGNOSIS — M18.12 PRIMARY OSTEOARTHRITIS OF FIRST CARPOMETACARPAL JOINT OF LEFT HAND: ICD-10-CM

## 2024-12-18 PROCEDURE — 99024 POSTOP FOLLOW-UP VISIT: CPT | Performed by: PHYSICIAN ASSISTANT

## 2024-12-18 PROCEDURE — 73130 X-RAY EXAM OF HAND: CPT | Performed by: PHYSICIAN ASSISTANT

## 2024-12-18 PROCEDURE — 3008F BODY MASS INDEX DOCD: CPT | Performed by: PHYSICIAN ASSISTANT

## 2024-12-18 NOTE — PROGRESS NOTES
Clinic Note     Assessment/Plan:  63 year old with     Right thumb CMC arthroplasty 12/12/2024-patient will continue use the brace full-time and protect the thumb.  We discussed gentle motion.  No heavy lifting gripping or grasping.  All of his questions were answered and Yolanda return in 4 weeks  Left thumb CMC osteoarthritis -status post 2 injections  Left ring finger trigger digit status post 2 corticosteroid injection with no recurrence.    Follow up: 4 weeks with x-rays before    Imaging:   XR right thumb: Degenerative changes of CMC joint.      Physical Exam:    Ht 6' (1.829 m)   Wt 250 lb (113.4 kg)   BMI 33.91 kg/m²     Skin: Incision healing well with no signs of infection..  Expected swelling and ecchymosis  Palpatory: Tender along the surgical site  Range of motion: Thumb has about 50% of normal extension flexion.  Full composite fist.      CC: Right base of thumb pain    HPI: This 64 year old left-hand-dominant male presents with pain at the base of the thumb.  He has had pain there for a number of years.  Pain is moderate to severe.  It is worse with use of his hand.  He works as a making .  The pain is described as sharp and constant.  He has tried wearing hand brace.  The pain does not wake him up at night.    Interval history: Patient underwent CMC arthroplasty and is postop is still improving  Past Medical History:    Anemia    Diabetes (HCC)    from chronic steriod use    Family history of malignant hyperthermia    COUSIN HAD HISTORY    Flatulence/gas pain/belching    Heartburn    High blood pressure    History of blood transfusion    NO REACTION    Lab test positive for detection of COVID-19 virus    5/2020, subsequent testing negative    Microcytic anemia    Myasthenia gravis (HCC)    7/2020    OTHER DISEASES    Prostate nodule    Psoriasis    Sleep apnea    CPAP     Past Surgical History:   Procedure Laterality Date    Cataract Bilateral     Colonoscopy  2009    normal with  diverticuli    Colonoscopy,diagnostic  09/09/2009    Performed by DEONTE TYLER at [facility]    Mastoidectomy,simple  11/29/2022    Other surgical history  1978    foot    Other surgical history  2009    prostate biopsy, normal    Upper gi endoscopy,biopsy  09/09/2009    duodenal ulcer     Current Outpatient Medications   Medication Sig Dispense Refill    HYDROcodone-acetaminophen (NORCO) 5-325 MG Oral Tab Take 1 tablet by mouth every 6 (six) hours as needed for Pain. 30 tablet 0    PANTOPRAZOLE 40 MG Oral Tab EC TAKE 1 TABLET(40 MG) BY MOUTH TWICE DAILY BEFORE MEALS 180 tablet 0    Mycophenolate Mofetil 500 MG Oral Tab TAKE 2 tablets BY MOUTH TWICE DAILY. Will review blood work before next refill. 120 tablet 0    lisinopril 40 MG Oral Tab Take 1 tablet (40 mg total) by mouth daily. 90 tablet 1    metFORMIN 500 MG Oral Tab Take 1 tablet (500 mg total) by mouth 2 (two) times daily with meals. 180 tablet 1    TEMAZEPAM 15 MG Oral Cap TAKE 1 CAPSULE(15 MG) BY MOUTH EVERY NIGHT AS NEEDED FOR SLEEP 30 capsule 0    TALTZ 80 MG/ML Subcutaneous Solution Auto-injector       pyridostigmine 60 MG Oral Tab TAKE 1 TABLET BY MOUTH FIVE TIMES DAILY. 450 tablet 3    efgartigimod jamison-fcab (VYVGART) 400 MG/20ML Intravenous Solution injection Inject into the vein As Directed. EVERY 8 WEEK, 4 MORE SESSIONS LEFT      ferrous sulfate 325 (65 FE) MG Oral Tab EC Take 1 tablet (325 mg total) by mouth daily with breakfast.  0    Ascorbic Acid (VITAMIN C OR) Take 500 mg by mouth daily.      Acidophilus/Pectin Oral Cap Take 1 capsule by mouth daily.      omega-3 fatty acids 1000 MG Oral Cap Take 1,000 mg by mouth daily.      Turmeric 500 MG Oral Cap Take 1 capsule by mouth every evening.        VITAMIN D 400 UNIT OR CAPS 1 CAPSULE DAILY       No Known Allergies  Family History   Problem Relation Age of Onset    Diabetes Father     Other (brain tumors) Father     Other (uremic poisoning) Paternal Grandfather     Other (malignant  hyperthermia) Maternal Cousin Male      Social History     Occupational History    Not on file   Tobacco Use    Smoking status: Former     Current packs/day: 0.00     Average packs/day: 1 pack/day for 20.0 years (20.0 ttl pk-yrs)     Types: Cigarettes     Start date: 1978     Quit date: 1998     Years since quittin.6    Smokeless tobacco: Never   Vaping Use    Vaping status: Never Used   Substance and Sexual Activity    Alcohol use: Yes     Alcohol/week: 8.0 standard drinks of alcohol     Types: 8 Standard drinks or equivalent per week     Comment: 1-2 per day during the week, 3-4 on weekends    Drug use: No    Sexual activity: Not on file   Yolanda Major PA-C  Hand, Wrist, & Elbow Surgery  Physician Assistant to Dr. Remberto [email or web address]  t: [phone]  f: [phone]

## 2024-12-24 ENCOUNTER — OFFICE VISIT (OUTPATIENT)
Dept: PHYSICAL THERAPY | Age: 64
End: 2024-12-24
Attending: PHYSICIAN ASSISTANT
Payer: COMMERCIAL

## 2024-12-24 PROCEDURE — 97165 OT EVAL LOW COMPLEX 30 MIN: CPT

## 2024-12-24 PROCEDURE — 97110 THERAPEUTIC EXERCISES: CPT

## 2024-12-24 NOTE — PROGRESS NOTES
OCCUPATIONAL THERAPY UPPER EXTREMITY EVALUATION     Diagnosis:   Primary osteoarthritis of first carpometacarpal joint of left hand      Referring Provider:   JOSE OTTO Date of Evaluation:    12/24/24    Precautions:  None Next MD visit:   none scheduled  Date of Surgery: 12/12/24   Order Date:  12/12/2024  Authorizing Provider:   JOSE OTTO     Procedure:  OP REFERRAL TO [facility] [[id number]]         Order #:   [id number]  Qty:  1     Priority:  Critical                   Class:   IHP - RFL     Standing Interval:          Standing Occurrences:          Expires on:            Expected by:    Associated DX:  Primary osteoarthritis of first carpometacarpal joint of left hand (M18.12)     Order summary:  IHP - RFL, Critical, 8 visits  Occupational  Therapy Area of Concentration: Orthopedic  Occupational Therapy Ortho: UE  If the patient can be seen sooner with a PT vs an OT, can we cancel this order and replace with a PT order? No         Comments:  Post op: please schedule in 5-7 days  Please fabricate a thumb spica forearm based splint, Ip free  Ok for gentle thumb motion  1-2x/week for 6 weeks, if needed        PATIENT SUMMARY   Pt is a 64 year old male who presents to therapy today with complaints of decreased function  Pt describes pain level current 2/10, at best 2/10, at worst 2/10.   Current functional limitations include using mouse, typing, gripping, carrying items, opening jars/bottles,lifting.carrying, driving.  Pt describes prior level of function independent.   Employment: Temporary leave. Pt is a   Hand Dominance: left  Living Situation: Family  Imaging/Tests: X-ray  Pt goals include return to PLOF.  Past medical history was reviewed with pt. Significant findings include:  Anemia     Diabetes (HCC)     from chronic steriod use    Family history of malignant hyperthermia     COUSIN HAD HISTORY    Flatulence/gas pain/belching    Heartburn    High blood pressure     History of blood transfusion     NO REACTION    Lab test positive for detection of COVID-19 virus     5/2020, subsequent testing negative    Microcytic anemia    Myasthenia gravis (HCC)     7/2020    OTHER DISEASES    Prostate nodule    Psoriasis    Sleep apnea     CPAP       ASSESSMENT  Pt presents to occupational therapy evaluation with primary c/o decreased function. The results of the objective tests and measures show decreased ROM, decreased strength, increased edema and increased subjective c/o pain .  Functional deficits include but are not limited to using mouse, typing, gripping, carrying items, opening jars/bottles.  Signs and symptoms are consistent with diagnosis of s/p (R) CMC arthroplasty. Pt and OT discussed evaluation findings, pathology, POC and HEP.  Pt voiced understanding and performs HEP correctly without reported pain. Skilled Occupational Therapy is medically necessary to address the above impairments and reach functional goals.     OBJECTIVE    OBSERVATION: Scars closed    ORTHOTICS: custom thumb spica forearm based    SCAR:  Closed      SENSORY: WNL      CIRCUMFERENTIAL EDEMA (cm):  Right Wrist crease: 22.0  Left Wrist crease: 19.5    ROM: (* denotes performed with pain)  Wrist   Flexion: R 55, L 85  Extension: R 65, L 60  Ulnar Deviation: R 30, L 25  Radial Deviation R 15, L 15     AROM Thumb    R L  Rabd  40 35  Pabd  15/45 0/45  MP flexion 30 55  IP flexion 65 65  Opposition WNL WNL        Strength (lbs) Right Average Left Average   : NT NT   2 pt Pinch: NT NT   3 pt Pinch: NT NT   Lateral Pinch: NT NT       Today’s Treatment and Response:   Treatment provided today in addition to the initial evaluation:   Date 12/24/24         Visit # 1/12         Evaluation Initial  X         Manual                                             Ther ex                                                                                                         HEP issued See table below         Therapeutic  Activity                                                          Neuromuscular Re-education                                  Modalities       P x 5\" x                    X= performed this date as previously outlined    Pt education was provided on exam findings, treatment diagnosis, treatment plan, expectations, and prognosis. Patient was instructed in and issued a HEP.    HEP Date issued  Date amended Date discharged  Comments (HEP2Go code if used)   AROM thumb 12/24/24      Scar management 12/24/24      Edema management 12/24/24                                      Charges: OT Eval: Low Complexity, 1TE      Total Timed Treatment: 45 min     Total Treatment Time: 45 min     Based on clinical rationale and outcome measures, this evaluation involved Low Complexity decision making due to 1-2 personal factors/comorbidities, body structures involved/activity limitations, and evolving symptoms including changing pain levels.  PLAN OF CARE   Goals: (to be met in 12 visits)  Increase AROM of (R) thumb pabd by 5-10 degrees to allow pt to grasp tools.  Increase AROM of (R) thumb MP flexion by 5-10 degrees to allow pt to use mouse.    Increase AROM of (R) wrist flexion by 5-10 degrees to allow pt to open jars.    Assess  and pinch when able.   Pt will be independent and compliant with comprehensive HEP to maintain progress achieved in OT    Frequency / Duration: Patient will be seen for 2 x/week or a total of 12 visits over a 90 day period.  Treatment will include: Manual Therapy, Neuromuscular Re-education, Self-Care Home Management, Therapeutic Activities, Therapeutic Exercise, Home Exercise Program instruction, and Modalities to include: Ultrasound    Education or treatment limitation: None  Rehab Potential:good    Patient/Family/Caregiver was advised of these findings, precautions, and treatment options and has agreed to actively participate in planning and for this course of care.    Thank you for your referral.  Please co-sign or sign and return this letter via fax as soon as possible to [phone]. If you have any questions, please contact me at Dept: [phone]    Sincerely,  Electronically signed by therapist: DORIS Castañeda  Physician's certification required: Yes  I certify the need for these services furnished under this plan of treatment and while under my care.    X___________________________________________________ Date____________________    Certification From: 12/24/2024  To:3/26/2025

## 2024-12-26 ENCOUNTER — LAB ENCOUNTER (OUTPATIENT)
Dept: LAB | Age: 64
End: 2024-12-26
Attending: STUDENT IN AN ORGANIZED HEALTH CARE EDUCATION/TRAINING PROGRAM
Payer: COMMERCIAL

## 2024-12-26 ENCOUNTER — TELEPHONE (OUTPATIENT)
Dept: NEUROLOGY | Facility: CLINIC | Age: 64
End: 2024-12-26

## 2024-12-26 DIAGNOSIS — R79.89 ELEVATED LFTS: ICD-10-CM

## 2024-12-26 DIAGNOSIS — G70.00 MYASTHENIA GRAVIS (HCC): ICD-10-CM

## 2024-12-26 DIAGNOSIS — D50.9 IRON DEFICIENCY ANEMIA, UNSPECIFIED IRON DEFICIENCY ANEMIA TYPE: ICD-10-CM

## 2024-12-26 LAB
ALBUMIN SERPL-MCNC: 4.4 G/DL (ref 3.2–4.8)
ALBUMIN/GLOB SERPL: 1.6 {RATIO} (ref 1–2)
ALP LIVER SERPL-CCNC: 126 U/L
ALT SERPL-CCNC: 63 U/L
ANION GAP SERPL CALC-SCNC: 8 MMOL/L (ref 0–18)
AST SERPL-CCNC: 62 U/L (ref ?–34)
BASOPHILS # BLD AUTO: 0.13 X10(3) UL (ref 0–0.2)
BASOPHILS NFR BLD AUTO: 1.7 %
BILIRUB SERPL-MCNC: 0.8 MG/DL (ref 0.2–1.1)
BUN BLD-MCNC: 12 MG/DL (ref 9–23)
BUN/CREAT SERPL: 15 (ref 10–20)
CALCIUM BLD-MCNC: 9.9 MG/DL (ref 8.7–10.4)
CHLORIDE SERPL-SCNC: 103 MMOL/L (ref 98–112)
CO2 SERPL-SCNC: 28 MMOL/L (ref 21–32)
CREAT BLD-MCNC: 0.8 MG/DL
DEPRECATED RDW RBC AUTO: 45.5 FL (ref 35.1–46.3)
EGFRCR SERPLBLD CKD-EPI 2021: 98 ML/MIN/1.73M2 (ref 60–?)
EOSINOPHIL # BLD AUTO: 0.33 X10(3) UL (ref 0–0.7)
EOSINOPHIL NFR BLD AUTO: 4.4 %
ERYTHROCYTE [DISTWIDTH] IN BLOOD BY AUTOMATED COUNT: 14.8 % (ref 11–15)
FASTING STATUS PATIENT QL REPORTED: YES
GLOBULIN PLAS-MCNC: 2.8 G/DL (ref 2–3.5)
GLUCOSE BLD-MCNC: 170 MG/DL (ref 70–99)
HAV IGM SER QL: NONREACTIVE
HBV CORE AB SERPL QL IA: NONREACTIVE
HBV CORE IGM SER QL: NONREACTIVE
HBV SURFACE AB SER QL: NONREACTIVE
HBV SURFACE AB SERPL IA-ACNC: <3.1 MIU/ML
HBV SURFACE AG SER-ACNC: <0.1 [IU]/L
HBV SURFACE AG SERPL QL IA: NONREACTIVE
HBV SURFACE AG SERPL QL IA: NONREACTIVE
HCT VFR BLD AUTO: 33.8 %
HCV AB SERPL QL IA: NONREACTIVE
HGB BLD-MCNC: 10 G/DL
IMM GRANULOCYTES # BLD AUTO: 0.16 X10(3) UL (ref 0–1)
IMM GRANULOCYTES NFR BLD: 2.1 %
LYMPHOCYTES # BLD AUTO: 1.09 X10(3) UL (ref 1–4)
LYMPHOCYTES NFR BLD AUTO: 14.5 %
MCH RBC QN AUTO: 24.9 PG (ref 26–34)
MCHC RBC AUTO-ENTMCNC: 29.6 G/DL (ref 31–37)
MCV RBC AUTO: 84.3 FL
MONOCYTES # BLD AUTO: 0.82 X10(3) UL (ref 0.1–1)
MONOCYTES NFR BLD AUTO: 10.9 %
NEUTROPHILS # BLD AUTO: 4.97 X10 (3) UL (ref 1.5–7.7)
NEUTROPHILS # BLD AUTO: 4.97 X10(3) UL (ref 1.5–7.7)
NEUTROPHILS NFR BLD AUTO: 66.4 %
OSMOLALITY SERPL CALC.SUM OF ELEC: 292 MOSM/KG (ref 275–295)
PLATELET # BLD AUTO: 230 10(3)UL (ref 150–450)
POTASSIUM SERPL-SCNC: 4.1 MMOL/L (ref 3.5–5.1)
PROT SERPL-MCNC: 7.2 G/DL (ref 5.7–8.2)
RBC # BLD AUTO: 4.01 X10(6)UL
SODIUM SERPL-SCNC: 139 MMOL/L (ref 136–145)
WBC # BLD AUTO: 7.5 X10(3) UL (ref 4–11)

## 2024-12-26 PROCEDURE — 80074 ACUTE HEPATITIS PANEL: CPT

## 2024-12-26 PROCEDURE — 86803 HEPATITIS C AB TEST: CPT

## 2024-12-26 PROCEDURE — 85025 COMPLETE CBC W/AUTO DIFF WBC: CPT

## 2024-12-26 PROCEDURE — 86706 HEP B SURFACE ANTIBODY: CPT

## 2024-12-26 PROCEDURE — 82103 ALPHA-1-ANTITRYPSIN TOTAL: CPT

## 2024-12-26 PROCEDURE — 36415 COLL VENOUS BLD VENIPUNCTURE: CPT

## 2024-12-26 PROCEDURE — 80053 COMPREHEN METABOLIC PANEL: CPT

## 2024-12-26 PROCEDURE — 83516 IMMUNOASSAY NONANTIBODY: CPT

## 2024-12-26 PROCEDURE — 87340 HEPATITIS B SURFACE AG IA: CPT

## 2024-12-26 PROCEDURE — 80503 PATH CLIN CONSLTJ SF 5-20: CPT

## 2024-12-26 PROCEDURE — 86704 HEP B CORE ANTIBODY TOTAL: CPT

## 2024-12-26 RX ORDER — MYCOPHENOLATE MOFETIL 500 MG/1
TABLET ORAL
Qty: 120 TABLET | Refills: 2 | Status: SHIPPED | OUTPATIENT
Start: 2024-12-26

## 2024-12-26 NOTE — TELEPHONE ENCOUNTER
Patient requesting refill of Cellcept before end of year.    Medication: Mycophenolate Mofetil 500mg     Date of last refill: 11/25/24 (#120/0)  [place]      Last office visit: 6/20/24  Due back to clinic per last office note:  8m  Date next office visit scheduled:    Future Appointments   Date Time Provider Department Center   12/31/2024  9:00 AM Larry Cordero MD [facility] SUB GI   12/31/2024  9:15 AM Larry Cordero MD [facility] SUB GI   1/3/2025 11:45 AM Jeffery Doradoha, OT [facility] [place]   1/6/2025 11:45 AM EstradaJefferyha, OT [facility] [place]   1/9/2025 10:15 AM EstradaJeffery maradiagaha, OT [facility] [place]   1/13/2025 10:15 AM EstradaJeffery maradiagaha, OT [facility] [place]   1/15/2025 10:40 AM Yolanda Major PA [facility] [place]   1/16/2025 10:15 AM Estrada, Jefferyha, OT [facility] [place]   1/23/2025 10:15 AM EstradaJefferyha, OT [facility] [place]   1/28/2025 10:15 AM Jeffery Doradoha, OT [facility] [place]   1/30/2025 10:15 AM Jeffery Doradoha, OT [facility] [place]   2/3/2025 10:15 AM Jeffery Doradoha, OT [facility] [place]   2/6/2025 10:15 AM EstradaJeffery maradiagaha, OT [facility] [place]   3/10/2025 11:35 AM Alma Villafana DO [facility] [place]           Last OV note recommendation:    Discussion/Plan:  Elevated LFTs- possible Cellcept related, have nearly normalized on repeat blood draw with Cellcept decrease back to 1250mg BID from 1500mg BID in 2023. However, now are elevated again. Repeat LFT's in 3-4 weeks. If still elevated, will recommend evaluation with Dr. Gibson. Has history of fatty liver.     MG seropositive   Proximal lower extremity fatigue less frequent with Vyvgart  At this time, change Vyvgart once weekly 4 weeks on, 7 weeks off   Continue Cellcept 1250mg BID- check repeat LFT's as above  Continue mestinon 60mg QID  Check CBC/CMP q 3 months

## 2024-12-26 NOTE — TELEPHONE ENCOUNTER
[facility] placed in nurses bin for review.

## 2024-12-27 LAB
A-1-ANTITRYPSIN: 196 MG/DL
ACTIN SMOOTH MUSCLE AB: 5 UNITS
HCV AB: NON REACTIVE
M2 MITOCHONDRIAL AB: <20 UNITS

## 2024-12-28 ENCOUNTER — TELEPHONE (OUTPATIENT)
Dept: FAMILY MEDICINE CLINIC | Facility: CLINIC | Age: 64
End: 2024-12-28

## 2024-12-30 ENCOUNTER — TELEPHONE (OUTPATIENT)
Dept: PHYSICAL THERAPY | Facility: HOSPITAL | Age: 64
End: 2024-12-30

## 2024-12-30 ENCOUNTER — OFFICE VISIT (OUTPATIENT)
Dept: PHYSICAL THERAPY | Age: 64
End: 2024-12-30
Payer: COMMERCIAL

## 2024-12-30 PROCEDURE — 97140 MANUAL THERAPY 1/> REGIONS: CPT

## 2024-12-30 PROCEDURE — 97110 THERAPEUTIC EXERCISES: CPT

## 2024-12-30 NOTE — PROGRESS NOTES
Diagnosis:   Primary osteoarthritis of first carpometacarpal joint of left hand      Referring Provider:   JOSE OTTO Date of Evaluation:    12/24/24    Precautions:  None Next MD visit:   none scheduled  Date of Surgery: 12/12/24   Order Date:  12/12/2024  Authorizing Provider:   JOSE OTTO     Procedure:  OP REFERRAL TO [facility] [[id number]]         Order #:   [id number]  Qty:  1     Priority:  Critical                   Class:   IHP - RFL     Standing Interval:          Standing Occurrences:          Expires on:            Expected by:    Associated DX:  Primary osteoarthritis of first carpometacarpal joint of left hand (M18.12)     Order summary:  IHP - RFL, Critical, 8 visits  Occupational  Therapy Area of Concentration: Orthopedic  Occupational Therapy Ortho: UE  If the patient can be seen sooner with a PT vs an OT, can we cancel this order and replace with a PT order? No         Comments:  Post op: please schedule in 5-7 days  Please fabricate a thumb spica forearm based splint, Ip free  Ok for gentle thumb motion  1-2x/week for 6 weeks, if needed      SUBJECTIVE: Pt states that he had a dissolvable suture come to the surface.    PAIN LEVELS: 2/10        OBJECTIVE: See tx log for details.     ORTHOTICS: custom thumb spica forearm based    SCAR:  Closed      SENSORY: WNL      CIRCUMFERENTIAL EDEMA (cm):  Right Wrist crease: 22.0  Left Wrist crease: 19.5    ROM: (* denotes performed with pain)  Wrist   Flexion: R 55, L 85  Extension: R 65, L 60  Ulnar Deviation: R 30, L 25  Radial Deviation R 15, L 15     AROM Thumb    R L  Rabd  40 35  Pabd  15/45 0/45  MP flexion 30 55  IP flexion 65 65  Opposition WNL WNL        Strength (lbs) Right Average Left Average   : NT NT   2 pt Pinch: NT NT   3 pt Pinch: NT NT   Lateral Pinch: NT NT     ASSESSMENT  Reviewed HEP with pt. Pt performing as instructed. Pt reported having dissolvable suture appearing to surface. No signs of infection or  drainage noted. Advised pt to continue to monitor incisions.  Pt agree.  Mild edema noted along dorsal thenar eminence.  Pt continues to wear splint.  Good tolerance to session.           Today’s Treatment and Response:   Treatment provided today in addition to the initial evaluation:   Date 12/24/24 12/30/24       Visit # 1/12 2/12       Evaluation Initial  X         Manual           STM   x        scar massage    x                   Ther ex            AROM with fluidotherapy x 10\"   x        sponges    grasp and release, add/abd        pens    grasping                                                                  HEP issued See table below         Therapeutic Activity                                                          Neuromuscular Re-education                                  Modalities       MHP x 5\" x                    X= performed this date as previously outlined    Pt education was provided on exam findings, treatment diagnosis, treatment plan, expectations, and prognosis. Patient was instructed in and issued a HEP.    HEP Date issued  Date amended Date discharged  Comments (HEP2Go code if used)   AROM thumb 12/24/24      Scar management 12/24/24      Edema management 12/24/24                                          Goals: (to be met in 12 visits)  Increase AROM of (R) thumb pabd by 5-10 degrees to allow pt to grasp tools.  Increase AROM of (R) thumb MP flexion by 5-10 degrees to allow pt to use mouse.    Increase AROM of (R) wrist flexion by 5-10 degrees to allow pt to open jars.    Assess  and pinch when able.   Pt will be independent and compliant with comprehensive HEP to maintain progress achieved in OT    PLAN: Patient will be seen for 2 x/week or a total of 12 visits over a 90 day period.  Treatment will include: Manual Therapy, Neuromuscular Re-education, Self-Care Home Management, Therapeutic Activities, Therapeutic Exercise, Home Exercise Program instruction, and Modalities to  include: Ultrasound      Charges:  1 MT, 2 TE      Total Timed Treatment: 45 min     Total Treatment Time: 45 min     Brian Dorado,OTR/L

## 2024-12-31 PROBLEM — D12.5 BENIGN NEOPLASM OF SIGMOID COLON: Status: ACTIVE | Noted: 2024-12-31

## 2024-12-31 PROBLEM — Z12.11 SPECIAL SCREENING FOR MALIGNANT NEOPLASMS, COLON: Status: ACTIVE | Noted: 2024-12-31

## 2024-12-31 PROBLEM — D12.2 BENIGN NEOPLASM OF ASCENDING COLON: Status: ACTIVE | Noted: 2024-12-31

## 2025-01-02 RX ORDER — EFGARTIGIMOD ALFA 20 MG/ML
400 INJECTION INTRAVENOUS AS DIRECTED
Refills: 0 | Status: CANCELLED | OUTPATIENT
Start: 2025-01-02

## 2025-01-03 ENCOUNTER — OFFICE VISIT (OUTPATIENT)
Dept: PHYSICAL THERAPY | Age: 65
End: 2025-01-03
Attending: PHYSICIAN ASSISTANT
Payer: COMMERCIAL

## 2025-01-03 PROCEDURE — 97140 MANUAL THERAPY 1/> REGIONS: CPT

## 2025-01-03 PROCEDURE — 97110 THERAPEUTIC EXERCISES: CPT

## 2025-01-03 NOTE — PROGRESS NOTES
Diagnosis:   Primary osteoarthritis of first carpometacarpal joint of left hand      Referring Provider:   JOSE OTTO Date of Evaluation:    12/24/24    Precautions:  None Next MD visit:   none scheduled  Date of Surgery: 12/12/24   Order Date:  12/12/2024  Authorizing Provider:   JOSE OTTO     Procedure:  OP REFERRAL TO [facility] [[id number]]         Order #:   [id number]  Qty:  1     Priority:  Critical                   Class:   IHP - RFL     Standing Interval:          Standing Occurrences:          Expires on:            Expected by:    Associated DX:  Primary osteoarthritis of first carpometacarpal joint of left hand (M18.12)     Order summary:  IHP - RFL, Critical, 8 visits  Occupational  Therapy Area of Concentration: Orthopedic  Occupational Therapy Ortho: UE  If the patient can be seen sooner with a PT vs an OT, can we cancel this order and replace with a PT order? No         Comments:  Post op: please schedule in 5-7 days  Please fabricate a thumb spica forearm based splint, Ip free  Ok for gentle thumb motion  1-2x/week for 6 weeks, if needed      SUBJECTIVE: Pt states that he has some redness around scar.     PAIN LEVELS: 2/10        OBJECTIVE: See tx log for details.     ORTHOTICS: custom thumb spica forearm based    SCAR:  Closed      SENSORY: WNL      CIRCUMFERENTIAL EDEMA (cm):  Right Wrist crease: 22.0  Left Wrist crease: 19.5    ROM: (* denotes performed with pain)  Wrist   Flexion: R 55, L 85  Extension: R 65, L 60  Ulnar Deviation: R 30, L 25  Radial Deviation R 15, L 15     AROM Thumb    R L  Rabd  40 35  Pabd  15/45 0/45  MP flexion 30 55  IP flexion 65 65  Opposition WNL WNL        Strength (lbs) Right Average Left Average   : NT NT   2 pt Pinch: NT NT   3 pt Pinch: NT NT   Lateral Pinch: NT NT     ASSESSMENT  Redness noted around scar. No further sutures appeared per pt. Pt advised to continue to monitor scar for any signs of infection. Pt agreed. Mild  difficulty with supination noted.  Improved digit and thumb ROM at this time.            Today’s Treatment and Response:   Treatment provided today in addition to the initial evaluation:   Date 12/24/24  12/30/24  1/3/25     Visit # 1/12  2/12  3/12     Evaluation Initial  X         Manual           STM   x  x      scar massage    x  x                 Ther ex            AROM with fluidotherapy x 10\"   x  x      sponges    grasp and release, add/abd  x      pens    grasping        frisbee pertubation      CW/CCW x 2 min in each direction                                                    HEP issued See table below         Therapeutic Activity                                                          Neuromuscular Re-education                                  Modalities       MHP x 5\" x                    X= performed this date as previously outlined    Pt education was provided on exam findings, treatment diagnosis, treatment plan, expectations, and prognosis. Patient was instructed in and issued a HEP.    HEP Date issued  Date amended Date discharged  Comments (HEP2Go code if used)   AROM thumb 12/24/24      Scar management 12/24/24      Edema management 12/24/24                                          Goals: (to be met in 12 visits)  Increase AROM of (R) thumb pabd by 5-10 degrees to allow pt to grasp tools.  Increase AROM of (R) thumb MP flexion by 5-10 degrees to allow pt to use mouse.    Increase AROM of (R) wrist flexion by 5-10 degrees to allow pt to open jars.    Assess  and pinch when able.   Pt will be independent and compliant with comprehensive HEP to maintain progress achieved in OT    PLAN: Patient will be seen for 2 x/week or a total of 12 visits over a 90 day period.  Treatment will include: Manual Therapy, Neuromuscular Re-education, Self-Care Home Management, Therapeutic Activities, Therapeutic Exercise, Home Exercise Program instruction, and Modalities to include: Ultrasound      Charges:  1  MT, 2 TE      Total Timed Treatment: 45 min     Total Treatment Time: 45 min     Brian Dorado,OTR/L

## 2025-01-06 ENCOUNTER — OFFICE VISIT (OUTPATIENT)
Dept: PHYSICAL THERAPY | Age: 65
End: 2025-01-06
Attending: PHYSICIAN ASSISTANT
Payer: COMMERCIAL

## 2025-01-06 PROCEDURE — 97140 MANUAL THERAPY 1/> REGIONS: CPT

## 2025-01-06 PROCEDURE — 97110 THERAPEUTIC EXERCISES: CPT

## 2025-01-06 NOTE — PROGRESS NOTES
Diagnosis:   Primary osteoarthritis of first carpometacarpal joint of left hand      Referring Provider:   JOSE OTTO Date of Evaluation:    12/24/24    Precautions:  None Next MD visit:   none scheduled  Date of Surgery: 12/12/24   Order Date:  12/12/2024  Authorizing Provider:   JOSE OTTO     Procedure:  OP REFERRAL TO [facility] [[id number]]         Order #:   [id number]  Qty:  1     Priority:  Critical                   Class:   IHP - RFL     Standing Interval:          Standing Occurrences:          Expires on:            Expected by:    Associated DX:  Primary osteoarthritis of first carpometacarpal joint of left hand (M18.12)     Order summary:  IHP - RFL, Critical, 8 visits  Occupational  Therapy Area of Concentration: Orthopedic  Occupational Therapy Ortho: UE  If the patient can be seen sooner with a PT vs an OT, can we cancel this order and replace with a PT order? No         Comments:  Post op: please schedule in 5-7 days  Please fabricate a thumb spica forearm based splint, Ip free  Ok for gentle thumb motion  1-2x/week for 6 weeks, if needed      SUBJECTIVE: Pt states that his hand is really weak.       PAIN LEVELS: 2/10        OBJECTIVE: See tx log for details.     ORTHOTICS: custom thumb spica forearm based    SCAR:  Closed      SENSORY: WNL      CIRCUMFERENTIAL EDEMA (cm):  Right Wrist crease: 22.0  Left Wrist crease: 19.5    ROM: (* denotes performed with pain)  Wrist   Flexion: R 55, L 85  Extension: R 65, L 60  Ulnar Deviation: R 30, L 25  Radial Deviation R 15, L 15     AROM Thumb    R L  Rabd  40 35  Pabd  15/45 0/45  MP flexion 30 55  IP flexion 65 65  Opposition WNL WNL        Strength (lbs) Right Average Left Average   : NT NT   2 pt Pinch: NT NT   3 pt Pinch: NT NT   Lateral Pinch: NT NT     ASSESSMENT  No redness noted around scar at this time. Improved ROM in thumb reported. Pt however continues to have weakness in thumb limiting his ability to  and  carry items in hand. Pt continues to wear splint due to instability in thumb. Pt performing HEP as instructed. Pt to see MD next week and will advance per MD recommendations.        Today’s Treatment and Response:   Treatment provided today in addition to the initial evaluation:   Date 12/24/24  12/30/24  1/3/25  1/6/25   Visit # 1/12  2/12  3/12  4/12   Evaluation Initial  X         Manual           STM   x  x  x    scar massage    x  x  x               Ther ex            AROM with fluidotherapy x 10\"   x  x  x    sponges    grasp and release, add/abd  x  x    pens    grasping        frisbee pertubation      CW/CCW x 2 min in each direction  x                                                  HEP issued See table below         Therapeutic Activity                                                          Neuromuscular Re-education                                  Modalities       MHP x 5\" x                    X= performed this date as previously outlined    Pt education was provided on exam findings, treatment diagnosis, treatment plan, expectations, and prognosis. Patient was instructed in and issued a HEP.    HEP Date issued  Date amended Date discharged  Comments (HEP2Go code if used)   AROM thumb 12/24/24      Scar management 12/24/24      Edema management 12/24/24                                          Goals: (to be met in 12 visits)  Increase AROM of (R) thumb pabd by 5-10 degrees to allow pt to grasp tools.  Increase AROM of (R) thumb MP flexion by 5-10 degrees to allow pt to use mouse.    Increase AROM of (R) wrist flexion by 5-10 degrees to allow pt to open jars.    Assess  and pinch when able.   Pt will be independent and compliant with comprehensive HEP to maintain progress achieved in OT    PLAN: Patient will be seen for 2 x/week or a total of 12 visits over a 90 day period.  Treatment will include: Manual Therapy, Neuromuscular Re-education, Self-Care Home Management, Therapeutic Activities,  Therapeutic Exercise, Home Exercise Program instruction, and Modalities to include: Ultrasound      Charges:  1 MT, 2 TE      Total Timed Treatment: 45 min     Total Treatment Time: 45 min     SHELBIE Skaggs/L

## 2025-01-07 ENCOUNTER — TELEPHONE (OUTPATIENT)
Dept: NEUROLOGY | Facility: CLINIC | Age: 65
End: 2025-01-07

## 2025-01-07 NOTE — TELEPHONE ENCOUNTER
Pt changed ins 1/1/25. Cancelled infusion, however [facility] called stating it was ok. Pt calling to discuss with RN. Transferring call.

## 2025-01-07 NOTE — TELEPHONE ENCOUNTER
Spoke to patient. The [facility] message for a refill on V[facility] was a mistake. He meant to send that to the prescribing provider, not Dr Gibson.     Patient states to disregard this message.

## 2025-01-07 NOTE — TELEPHONE ENCOUNTER
Spoke to patient who states he that he changed insurance and does not know how his infusion will get paid.     Patient currently receives the infusion from [facility]. Advised that he let them know that his insurance has changed as they are the ones to bill.     He will call [facility] and provide information to them for billing and get on the schedule.

## 2025-01-09 ENCOUNTER — OFFICE VISIT (OUTPATIENT)
Dept: PHYSICAL THERAPY | Age: 65
End: 2025-01-09
Attending: PHYSICIAN ASSISTANT
Payer: COMMERCIAL

## 2025-01-09 PROCEDURE — 97110 THERAPEUTIC EXERCISES: CPT

## 2025-01-09 PROCEDURE — 97140 MANUAL THERAPY 1/> REGIONS: CPT

## 2025-01-09 NOTE — PROGRESS NOTES
Diagnosis:   Primary osteoarthritis of first carpometacarpal joint of left hand      Referring Provider:   JOSE OTTO Date of Evaluation:    12/24/24    Precautions:  None Next MD visit:   none scheduled  Date of Surgery: 12/12/24   Order Date:  12/12/2024  Authorizing Provider:   JOSE OTTO     Procedure:  OP REFERRAL TO [facility] [[id number]]         Order #:   [id number]  Qty:  1     Priority:  Critical                   Class:   IHP - RFL     Standing Interval:          Standing Occurrences:          Expires on:            Expected by:    Associated DX:  Primary osteoarthritis of first carpometacarpal joint of left hand (M18.12)     Order summary:  IHP - RFL, Critical, 8 visits  Occupational  Therapy Area of Concentration: Orthopedic  Occupational Therapy Ortho: UE  If the patient can be seen sooner with a PT vs an OT, can we cancel this order and replace with a PT order? No         Comments:  Post op: please schedule in 5-7 days  Please fabricate a thumb spica forearm based splint, Ip free  Ok for gentle thumb motion  1-2x/week for 6 weeks, if needed      SUBJECTIVE: Pt states that he has to wear his splint at night or it's sore.     PAIN LEVELS: 2/10        OBJECTIVE: See tx log for details.     ORTHOTICS: custom thumb spica forearm based    SCAR:  Closed      SENSORY: WNL      CIRCUMFERENTIAL EDEMA (cm):  Right Wrist crease: 22.0  Left Wrist crease: 19.5    ROM: (* denotes performed with pain)  Wrist   Flexion: R 55, L 85  Extension: R 65, L 60  Ulnar Deviation: R 30, L 25  Radial Deviation R 15, L 15     AROM Thumb    R L  Rabd  40 35  Pabd  15/45 0/45  MP flexion 30 55  IP flexion 65 65  Opposition WNL WNL        Strength (lbs) Right Average Left Average   : NT NT   2 pt Pinch: NT NT   3 pt Pinch: NT NT   Lateral Pinch: NT NT     ASSESSMENT  Mild soreness reported at night. Pt wearing splint at night to manage symptoms. Improved ROM noted in wrist and digits.  Pt able to use  hand for light functional activities without difficulty.        Today’s Treatment and Response:   Treatment provided today in addition to the initial evaluation:   Date 12/24/24  12/30/24  1/3/25  1/6/25 1/9/25   Visit # 1/12  2/12  3/12  4/12 5/12   Evaluation Initial  X          Manual            STM   x  x  x x    scar massage    x  x  x x                Ther ex             AROM with fluidotherapy x 10\"   x  x  x x    sponges    grasp and release, add/abd  x  x     pens    grasping         frisbee pertubation      CW/CCW x 2 min in each direction  x     digiflex         Red x 3 min    wrist maze          X 3 min    bilateral ball movements         With weighted balls, wrist (all planes) x 2 min each direction               HEP issued See table below          Therapeutic Activity                                                                 Neuromuscular Re-education                                      Modalities        MHP x 5\" x                       X= performed this date as previously outlined    Pt education was provided on exam findings, treatment diagnosis, treatment plan, expectations, and prognosis. Patient was instructed in and issued a HEP.    HEP Date issued  Date amended Date discharged  Comments (HEP2Go code if used)   AROM thumb 12/24/24      Scar management 12/24/24      Edema management 12/24/24                                          Goals: (to be met in 12 visits)  Increase AROM of (R) thumb pabd by 5-10 degrees to allow pt to grasp tools.  Increase AROM of (R) thumb MP flexion by 5-10 degrees to allow pt to use mouse.    Increase AROM of (R) wrist flexion by 5-10 degrees to allow pt to open jars.    Assess  and pinch when able.   Pt will be independent and compliant with comprehensive HEP to maintain progress achieved in OT    PLAN: Patient will be seen for 2 x/week or a total of 12 visits over a 90 day period.  Treatment will include: Manual Therapy, Neuromuscular Re-education,  Self-Care Home Management, Therapeutic Activities, Therapeutic Exercise, Home Exercise Program instruction, and Modalities to include: Ultrasound      Charges:  1 MT, 2 TE      Total Timed Treatment: 45 min     Total Treatment Time: 45 min     SHELBIE Skaggs/MANOJ

## 2025-01-13 ENCOUNTER — OFFICE VISIT (OUTPATIENT)
Dept: PHYSICAL THERAPY | Age: 65
End: 2025-01-13
Attending: PHYSICIAN ASSISTANT
Payer: COMMERCIAL

## 2025-01-13 ENCOUNTER — PATIENT MESSAGE (OUTPATIENT)
Dept: ORTHOPEDICS CLINIC | Facility: CLINIC | Age: 65
End: 2025-01-13

## 2025-01-13 DIAGNOSIS — M18.12 PRIMARY OSTEOARTHRITIS OF FIRST CARPOMETACARPAL JOINT OF LEFT HAND: Primary | ICD-10-CM

## 2025-01-13 PROCEDURE — 97110 THERAPEUTIC EXERCISES: CPT

## 2025-01-13 PROCEDURE — 97035 APP MDLTY 1+ULTRASOUND EA 15: CPT

## 2025-01-13 PROCEDURE — 97140 MANUAL THERAPY 1/> REGIONS: CPT

## 2025-01-13 NOTE — PROGRESS NOTES
Diagnosis:   Primary osteoarthritis of first carpometacarpal joint of left hand      Referring Provider:   JOSE OTTO Date of Evaluation:    12/24/24    Precautions:  None Next MD visit:   none scheduled  Date of Surgery: 12/12/24   Order Date:  12/12/2024  Authorizing Provider:   JOSE OTTO     Procedure:  OP REFERRAL TO [facility] [[id number]]         Order #:   [id number]  Qty:  1     Priority:  Critical                   Class:   IHP - RFL     Standing Interval:          Standing Occurrences:          Expires on:            Expected by:    Associated DX:  Primary osteoarthritis of first carpometacarpal joint of left hand (M18.12)     Order summary:  IHP - RFL, Critical, 8 visits  Occupational  Therapy Area of Concentration: Orthopedic  Occupational Therapy Ortho: UE  If the patient can be seen sooner with a PT vs an OT, can we cancel this order and replace with a PT order? No         Comments:  Post op: please schedule in 5-7 days  Please fabricate a thumb spica forearm based splint, Ip free  Ok for gentle thumb motion  1-2x/week for 6 weeks, if needed      SUBJECTIVE: Pt states that his thumb was sore this morning.      PAIN LEVELS: 2/10        OBJECTIVE: See tx log for details.     ORTHOTICS: custom thumb spica forearm based    SCAR:  Closed      SENSORY: WNL      CIRCUMFERENTIAL EDEMA (cm):  Right Wrist crease: 22.0  Left Wrist crease: 19.5    ROM: (* denotes performed with pain)  Wrist   Flexion: R 55, L 85  Extension: R 65, L 60  Ulnar Deviation: R 30, L 25  Radial Deviation R 15, L 15     AROM Thumb    R L  Rabd  40 35  Pabd  15/45 0/45  MP flexion 30 55  IP flexion 65 65  Opposition WNL WNL        Strength (lbs) Right Average Left Average   : NT NT   2 pt Pinch: NT NT   3 pt Pinch: NT NT   Lateral Pinch: NT NT     ASSESSMENT  Pt continues to have soreness in thumb due to weather. Pt advised to apply heat to  manage soreness.  Pt unable to use thumb for gripping, pinching  pulling and opening jars due to weakness.     Today’s Treatment and Response:   Treatment provided today in addition to the initial evaluation:   Date 12/24/24  12/30/24  1/3/25  1/6/25 1/9/25 1/13/25   Visit # 1/12  2/12  3/12  4/12 5/12 6/12   Evaluation Initial  X           Manual             STM   x  x  x x x    scar massage    x  x  x x x                 Ther ex              AROM with fluidotherapy x 10\"   x  x  x x x    sponges    grasp and release, add/abd  x  x  X with lumbrical bar    pens    grasping          frisbee pertubation      CW/CCW x 2 min in each direction  x      digiflex         Red x 3 min     wrist maze          X 3 min     bilateral ball movements         With weighted balls, wrist (all planes) x 2 min each direction    powerweb          Wrist stretches x 3 min    HEP issued See table below           Therapeutic Activity                                                                        Neuromuscular Re-education                                          Modalities         MHP x 5\" x        U.S. 3.0 Mhz, 1.0 w/cm^2 x 8\", cont over dorsal scar      #1            X= performed this date as previously outlined    Pt education was provided on exam findings, treatment diagnosis, treatment plan, expectations, and prognosis. Patient was instructed in and issued a HEP.    HEP Date issued  Date amended Date discharged  Comments (HEP2Go code if used)   AROM thumb 12/24/24      Scar management 12/24/24      Edema management 12/24/24                                          Goals: (to be met in 12 visits)  Increase AROM of (R) thumb pabd by 5-10 degrees to allow pt to grasp tools.  Increase AROM of (R) thumb MP flexion by 5-10 degrees to allow pt to use mouse.    Increase AROM of (R) wrist flexion by 5-10 degrees to allow pt to open jars.    Assess  and pinch when able.   Pt will be independent and compliant with comprehensive HEP to maintain progress achieved in OT    PLAN: Patient will be seen  for 2 x/week or a total of 12 visits over a 90 day period.  Treatment will include: Manual Therapy, Neuromuscular Re-education, Self-Care Home Management, Therapeutic Activities, Therapeutic Exercise, Home Exercise Program instruction, and Modalities to include: Ultrasound      Charges:  1 MT, 1TE , 1US     Total Timed Treatment: 45 min     Total Treatment Time: 45 min     Brian Dorado OTR/MANOJ

## 2025-01-15 ENCOUNTER — HOSPITAL ENCOUNTER (OUTPATIENT)
Dept: GENERAL RADIOLOGY | Age: 65
Discharge: HOME | End: 2025-01-15
Attending: PHYSICIAN ASSISTANT
Payer: COMMERCIAL

## 2025-01-15 ENCOUNTER — TELEPHONE (OUTPATIENT)
Dept: NEUROLOGY | Facility: CLINIC | Age: 65
End: 2025-01-15

## 2025-01-15 ENCOUNTER — OFFICE VISIT (OUTPATIENT)
Dept: ORTHOPEDICS CLINIC | Facility: CLINIC | Age: 65
End: 2025-01-15

## 2025-01-15 VITALS — HEIGHT: 72 IN | WEIGHT: 206 LBS | BODY MASS INDEX: 27.9 KG/M2

## 2025-01-15 DIAGNOSIS — M18.12 PRIMARY OSTEOARTHRITIS OF FIRST CARPOMETACARPAL JOINT OF LEFT HAND: ICD-10-CM

## 2025-01-15 DIAGNOSIS — M18.12 PRIMARY OSTEOARTHRITIS OF FIRST CARPOMETACARPAL JOINT OF LEFT HAND: Primary | ICD-10-CM

## 2025-01-15 PROCEDURE — 73130 X-RAY EXAM OF HAND: CPT | Performed by: PHYSICIAN ASSISTANT

## 2025-01-15 PROCEDURE — 99024 POSTOP FOLLOW-UP VISIT: CPT | Performed by: PHYSICIAN ASSISTANT

## 2025-01-15 NOTE — TELEPHONE ENCOUNTER
Received exam notes from [facility] date of service 1/8/25. Placed in provider folder for review.

## 2025-01-15 NOTE — PROGRESS NOTES
Clinic Note     Assessment/Plan:  63 year old with     Right thumb CMC arthroplasty 12/12/2024-patient will continue use the brace full-time and protect the thumb.  I have given a thumb spica brace in case that is more comfortable than the Orthoplast splint.  We discussed only gentle motion.  No heavy lifting gripping or grasping.  He does have some intermittent numbness along the back of the hand which we will monitor.  All of his questions were answered.    Left thumb CMC osteoarthritis -status post 2 injections  Left ring finger trigger digit status post 2 corticosteroid injection with no recurrence.    Follow up: 4 weeks with x-rays before    Imaging:   XR right thumb: Degenerative changes of CMC joint.      Physical Exam:    Ht 6' (1.829 m)   Wt 250 lb (113.4 kg)   BMI 33.91 kg/m²     Skin: Incision healing well with no signs of infection..  Expected swelling and ecchymosis  Palpatory: Tender along the surgical site  Range of motion: Thumb has about 50% of normal extension flexion.  Full composite fist.      CC: Right base of thumb pain    HPI: This 65 year old left-hand-dominant male presents with pain at the base of the thumb.  He has had pain there for a number of years.  Pain is moderate to severe.  It is worse with use of his hand.  He works as a making .  The pain is described as sharp and constant.  He has tried wearing hand brace.  The pain does not wake him up at night.    Interval history: Patient underwent CMC arthroplasty and is postop is still improving  Past Medical History:    Anemia    Diabetes (HCC)    from chronic steriod use    Family history of malignant hyperthermia    COUSIN HAD HISTORY    Flatulence/gas pain/belching    Heartburn    High blood pressure    History of blood transfusion    NO REACTION    Lab test positive for detection of COVID-19 virus    5/2020, subsequent testing negative    Microcytic anemia    Myasthenia gravis (HCC)    7/2020    OTHER DISEASES    Prostate  nodule    Psoriasis    Sleep apnea    CPAP     Past Surgical History:   Procedure Laterality Date    Cataract Bilateral     Colonoscopy  2009    normal with diverticuli    Colonoscopy,diagnostic  09/09/2009    Performed by DEONTE TYLER at [facility], [facility]    Mastoidectomy,simple  11/29/2022    Other surgical history  1978    foot    Other surgical history  2009    prostate biopsy, normal    Upper gi endoscopy,biopsy  09/09/2009    duodenal ulcer     Current Outpatient Medications   Medication Sig Dispense Refill    Mycophenolate Mofetil 500 MG Oral Tab TAKE 2 tablets BY MOUTH TWICE DAILY. Please get repeat blood work end of February 120 tablet 2    HYDROcodone-acetaminophen (NORCO) 5-325 MG Oral Tab Take 1 tablet by mouth every 6 (six) hours as needed for Pain. 30 tablet 0    PANTOPRAZOLE 40 MG Oral Tab EC TAKE 1 TABLET(40 MG) BY MOUTH TWICE DAILY BEFORE MEALS 180 tablet 0    lisinopril 40 MG Oral Tab Take 1 tablet (40 mg total) by mouth daily. 90 tablet 1    metFORMIN 500 MG Oral Tab Take 1 tablet (500 mg total) by mouth 2 (two) times daily with meals. 180 tablet 1    TEMAZEPAM 15 MG Oral Cap TAKE 1 CAPSULE(15 MG) BY MOUTH EVERY NIGHT AS NEEDED FOR SLEEP 30 capsule 0    TALTZ 80 MG/ML Subcutaneous Solution Auto-injector       pyridostigmine 60 MG Oral Tab TAKE 1 TABLET BY MOUTH FIVE TIMES DAILY. 450 tablet 3    efgartigimod jamison-fcab (VYVGART) 400 MG/20ML Intravenous Solution injection Inject into the vein As Directed. EVERY 8 WEEK, 4 MORE SESSIONS LEFT      ferrous sulfate 325 (65 FE) MG Oral Tab EC Take 1 tablet (325 mg total) by mouth daily with breakfast.  0    Ascorbic Acid (VITAMIN C OR) Take 500 mg by mouth daily.      Acidophilus/Pectin Oral Cap Take 1 capsule by mouth daily.      omega-3 fatty acids 1000 MG Oral Cap Take 1,000 mg by mouth daily.      Turmeric 500 MG Oral Cap Take 1 capsule by mouth every evening.        VITAMIN D 400 UNIT OR CAPS 1 CAPSULE DAILY       No Known Allergies  Family  History   Problem Relation Age of Onset    Diabetes Father     Other (brain tumors) Father     Other (uremic poisoning) Paternal Grandfather     Other (malignant hyperthermia) Maternal Cousin Male      Social History     Occupational History    Not on file   Tobacco Use    Smoking status: Former     Current packs/day: 0.00     Average packs/day: 1 pack/day for 20.0 years (20.0 ttl pk-yrs)     Types: Cigarettes     Start date: 1978     Quit date: 1998     Years since quittin.7    Smokeless tobacco: Never   Vaping Use    Vaping status: Never Used   Substance and Sexual Activity    Alcohol use: Yes     Alcohol/week: 8.0 standard drinks of alcohol     Types: 8 Standard drinks or equivalent per week     Comment: 1-2 per day during the week, 3-4 on weekends    Drug use: No    Sexual activity: Not on file   Yolanda Major PA-C  Hand, Wrist, & Elbow Surgery  Physician Assistant to Dr. Remberto [email or web address]  t: [phone]  f: [phone]

## 2025-01-16 ENCOUNTER — APPOINTMENT (OUTPATIENT)
Dept: PHYSICAL THERAPY | Age: 65
End: 2025-01-16
Attending: PHYSICIAN ASSISTANT
Payer: COMMERCIAL

## 2025-01-23 ENCOUNTER — APPOINTMENT (OUTPATIENT)
Dept: PHYSICAL THERAPY | Age: 65
End: 2025-01-23
Attending: PHYSICIAN ASSISTANT
Payer: COMMERCIAL

## 2025-01-28 ENCOUNTER — APPOINTMENT (OUTPATIENT)
Dept: PHYSICAL THERAPY | Age: 65
End: 2025-01-28
Attending: PHYSICIAN ASSISTANT
Payer: COMMERCIAL

## 2025-01-30 ENCOUNTER — APPOINTMENT (OUTPATIENT)
Dept: PHYSICAL THERAPY | Age: 65
End: 2025-01-30
Attending: PHYSICIAN ASSISTANT
Payer: COMMERCIAL

## 2025-01-30 ENCOUNTER — PATIENT MESSAGE (OUTPATIENT)
Dept: ORTHOPEDICS CLINIC | Facility: CLINIC | Age: 65
End: 2025-01-30

## 2025-01-30 NOTE — TELEPHONE ENCOUNTER
Copy/pasted pts 2nd message into original message also sent today 1/30/25.  Will  be addressed in that encounter.

## 2025-02-03 ENCOUNTER — APPOINTMENT (OUTPATIENT)
Dept: PHYSICAL THERAPY | Age: 65
End: 2025-02-03
Attending: PHYSICIAN ASSISTANT
Payer: COMMERCIAL

## 2025-02-06 ENCOUNTER — APPOINTMENT (OUTPATIENT)
Dept: PHYSICAL THERAPY | Age: 65
End: 2025-02-06
Attending: PHYSICIAN ASSISTANT
Payer: COMMERCIAL

## 2025-02-11 ENCOUNTER — TELEPHONE (OUTPATIENT)
Dept: PHYSICAL THERAPY | Facility: HOSPITAL | Age: 65
End: 2025-02-11

## 2025-02-12 ENCOUNTER — OFFICE VISIT (OUTPATIENT)
Dept: ORTHOPEDICS CLINIC | Facility: CLINIC | Age: 65
End: 2025-02-12
Payer: COMMERCIAL

## 2025-02-12 ENCOUNTER — PATIENT MESSAGE (OUTPATIENT)
Dept: ORTHOPEDICS CLINIC | Facility: CLINIC | Age: 65
End: 2025-02-12

## 2025-02-12 ENCOUNTER — HOSPITAL ENCOUNTER (OUTPATIENT)
Dept: GENERAL RADIOLOGY | Age: 65
Discharge: HOME | End: 2025-02-12
Attending: ORTHOPAEDIC SURGERY
Payer: COMMERCIAL

## 2025-02-12 VITALS — BODY MASS INDEX: 27.9 KG/M2 | WEIGHT: 206 LBS | HEIGHT: 72 IN

## 2025-02-12 DIAGNOSIS — M18.12 PRIMARY OSTEOARTHRITIS OF FIRST CARPOMETACARPAL JOINT OF LEFT HAND: Primary | ICD-10-CM

## 2025-02-12 DIAGNOSIS — M18.12 PRIMARY OSTEOARTHRITIS OF FIRST CARPOMETACARPAL JOINT OF LEFT HAND: ICD-10-CM

## 2025-02-12 PROCEDURE — 73130 X-RAY EXAM OF HAND: CPT | Performed by: ORTHOPAEDIC SURGERY

## 2025-02-12 NOTE — PROGRESS NOTES
Clinic Note     Assessment/Plan:  63 year old with     Right thumb CMC arthroplasty 12/12/2024-can discontinue bracing.  Can begin therapy to work on range of motion and strengthening.  Left thumb CMC osteoarthritis -status post 2 injections, we will likely proceed with steroid injection next visit  Left ring finger trigger digit status post 2 corticosteroid injection with no recurrence.    Follow up: 4 weeks with x-rays before    Imaging:   XR right thumb: evidence of cmc arthroplasty with slight subsidence. Dr Hart reviewed xrays.    Physical Exam:    Ht 6' (1.829 m)   Wt 250 lb (113.4 kg)   BMI 33.91 kg/m²     Skin: Incision healing well with no signs of infection..  Expected swelling and ecchymosis  Palpatory: Tender along the surgical site  Range of motion: Thumb has about 50% of normal extension flexion.  Full composite fist.      CC: Right base of thumb pain    HPI: This 65 year old left-hand-dominant male presents with pain at the base of the thumb.  He has had pain there for a number of years.  Pain is moderate to severe.  It is worse with use of his hand.  He works as a making .  The pain is described as sharp and constant.  He has tried wearing hand brace.  The pain does not wake him up at night.    Interval Hx (2/12/2025): Patient reports improvement in pain compared to preop.  He still has some discomfort and pain from time to time at the base of his thumb.      Past Medical History:    Anemia    Diabetes (HCC)    from chronic steriod use    Family history of malignant hyperthermia    COUSIN HAD HISTORY    Flatulence/gas pain/belching    Heartburn    High blood pressure    History of blood transfusion    NO REACTION    Lab test positive for detection of COVID-19 virus    5/2020, subsequent testing negative    Microcytic anemia    Myasthenia gravis (HCC)    7/2020    OTHER DISEASES    Prostate nodule    Psoriasis    Sleep apnea    CPAP     Past Surgical History:   Procedure Laterality Date     Cataract Bilateral     Colonoscopy  2009    normal with diverticuli    Colonoscopy,diagnostic  09/09/2009    Performed by DEONTE TYLER at [facility], [facility]    Mastoidectomy,simple  11/29/2022    Other surgical history  1978    foot    Other surgical history  2009    prostate biopsy, normal    Upper gi endoscopy,biopsy  09/09/2009    duodenal ulcer     Current Outpatient Medications   Medication Sig Dispense Refill    Mycophenolate Mofetil 500 MG Oral Tab TAKE 2 tablets BY MOUTH TWICE DAILY. Please get repeat blood work end of February 120 tablet 2    HYDROcodone-acetaminophen (NORCO) 5-325 MG Oral Tab Take 1 tablet by mouth every 6 (six) hours as needed for Pain. 30 tablet 0    PANTOPRAZOLE 40 MG Oral Tab EC TAKE 1 TABLET(40 MG) BY MOUTH TWICE DAILY BEFORE MEALS 180 tablet 0    lisinopril 40 MG Oral Tab Take 1 tablet (40 mg total) by mouth daily. 90 tablet 1    metFORMIN 500 MG Oral Tab Take 1 tablet (500 mg total) by mouth 2 (two) times daily with meals. 180 tablet 1    TEMAZEPAM 15 MG Oral Cap TAKE 1 CAPSULE(15 MG) BY MOUTH EVERY NIGHT AS NEEDED FOR SLEEP 30 capsule 0    TALTZ 80 MG/ML Subcutaneous Solution Auto-injector       pyridostigmine 60 MG Oral Tab TAKE 1 TABLET BY MOUTH FIVE TIMES DAILY. 450 tablet 3    efgartigimod jamison-fcab (VYVGART) 400 MG/20ML Intravenous Solution injection Inject into the vein As Directed. EVERY 8 WEEK, 4 MORE SESSIONS LEFT      ferrous sulfate 325 (65 FE) MG Oral Tab EC Take 1 tablet (325 mg total) by mouth daily with breakfast.  0    Ascorbic Acid (VITAMIN C OR) Take 500 mg by mouth daily.      Acidophilus/Pectin Oral Cap Take 1 capsule by mouth daily.      omega-3 fatty acids 1000 MG Oral Cap Take 1,000 mg by mouth daily.      Turmeric 500 MG Oral Cap Take 1 capsule by mouth every evening.        VITAMIN D 400 UNIT OR CAPS 1 CAPSULE DAILY       No Known Allergies  Family History   Problem Relation Age of Onset    Diabetes Father     Other (brain tumors) Father     Other  (uremic poisoning) Paternal Grandfather     Other (malignant hyperthermia) Maternal Cousin Male      Social History     Occupational History    Not on file   Tobacco Use    Smoking status: Former     Current packs/day: 0.00     Average packs/day: 1 pack/day for 20.0 years (20.0 ttl pk-yrs)     Types: Cigarettes     Start date: 1978     Quit date: 1998     Years since quittin.8    Smokeless tobacco: Never   Vaping Use    Vaping status: Never Used   Substance and Sexual Activity    Alcohol use: Yes     Alcohol/week: 8.0 standard drinks of alcohol     Types: 8 Standard drinks or equivalent per week     Comment: 1-2 per day during the week, 3-4 on weekends    Drug use: No    Sexual activity: Not on file     Remberto Hart MD   Hand, Wrist, & Elbow Surgery  [email or web address]  t: [phone]  f: [phone]

## 2025-02-13 ENCOUNTER — OFFICE VISIT (OUTPATIENT)
Dept: PHYSICAL THERAPY | Age: 65
End: 2025-02-13
Attending: PHYSICIAN ASSISTANT
Payer: COMMERCIAL

## 2025-02-13 PROCEDURE — 97110 THERAPEUTIC EXERCISES: CPT

## 2025-02-13 NOTE — PROGRESS NOTES
PROGRESS SUMMARY    Diagnosis:   Primary osteoarthritis of first carpometacarpal joint of left hand      Referring Provider:   JOSE OTTO Date of Evaluation:    12/24/24    Precautions:  None Next MD visit:   none scheduled  Date of Surgery: 12/12/24   Order Date:  12/12/2024  Authorizing Provider:   JOSE OTTO     Procedure:  OP REFERRAL TO [facility] [[id number]]         Order #:   [id number]  Qty:  1     Priority:  Critical                   Class:   IHP - RFL     Standing Interval:          Standing Occurrences:          Expires on:            Expected by:    Associated DX:  Primary osteoarthritis of first carpometacarpal joint of left hand (M18.12)     Order summary:  IHP - RFL, Critical, 8 visits  Occupational  Therapy Area of Concentration: Orthopedic  Occupational Therapy Ortho: [employer]  If the patient can be seen sooner with a PT vs an OT, can we cancel this order and replace with a PT order? No         Comments:  Post op: please schedule in 5-7 days  Please fabricate a thumb spica forearm based splint, Ip free  Ok for gentle thumb motion  1-2x/week for 6 weeks, if needed      SUBJECTIVE: Pt states that his thumb is still weak.        PAIN LEVELS: 1/10        OBJECTIVE: See tx log for details.     ORTHOTICS: custom thumb spica forearm based    SCAR:  Closed      SENSORY: WNL      CIRCUMFERENTIAL EDEMA (cm):  Right Wrist crease: 22.0  Left Wrist crease: 19.5    ROM: (* denotes performed with pain)  Wrist   Flexion: R 55, L 85  Extension: R 65, L 60  Ulnar Deviation: R 30, L 25  Radial Deviation R 15, L 15     AROM Thumb    R L  R Date: 2/13/25  Rabd  40 35  40  Pabd  15/45 0/45  10/45  MP flexion 30 55  30  IP flexion 65 65  65  Opposition WNL WNL  WNL        Strength (lbs) Right Average Left Average Right Average  Date: 2/13/2025 Left Average  Date: 2/13/2025   : NT NT 35 40   2 pt Pinch: NT NT 8 12   3 pt Pinch: NT NT 8 12   Lateral Pinch: NT NT 10 17     ASSESSMENT  Pt has been  see for 7/12 visits. Treatment has consisted of therauptic exercises, therapeutic activities, manual therapy, neuromuscular reeducation, pt education, HEP and modalities prn.   Pt returns to OT after not being seeing for one month. Pt reports having continued weakness in hand. Objective measurements demonstrate mild limitations with thumb ROM and decreased strength. Issued strengthening HEP. Pt demonstrated good understanding of HEP.  Pt to be discharged after one additional visit for follow up with strengthening HEP.      Today’s Treatment and Response:   Treatment provided today in addition to the initial evaluation:   Date 12/24/24  12/30/24  1/3/25  1/6/25 1/9/25 1/13/25 2/13/25   Visit # 1/12  2/12  3/12  4/12 5/12 6/12 7/12   Evaluation Initial  X            Manual              STM   x  x  x x x     scar massage    x  x  x x x                   Ther ex               AROM with fluidotherapy x 10\"   x  x  x x x x    sponges    grasp and release, add/abd  x  x  X with lumbrical bar     pens    grasping           frisbee pertubation      CW/CCW x 2 min in each direction  x       digiflex         Red x 3 min      wrist maze          X 3 min      bilateral ball movements         With weighted balls, wrist (all planes) x 2 min each direction     powerweb          Wrist stretches x 3 min    Objective meaurements       X 10\"    HEP issued See table below         X therapy putty strengthening   Therapeutic Activity                                                                               Neuromuscular Re-education                                              Modalities          MHP x 5\" x         U.S. 3.0 Mhz, 1.0 w/cm^2 x 8\", cont over dorsal scar      #1              X= performed this date as previously outlined    Pt education was provided on exam findings, treatment diagnosis, treatment plan, expectations, and prognosis. Patient was instructed in and issued a HEP.    HEP Date issued  Date amended Date discharged   Comments (HEP2Go code if used)   AROM thumb 12/24/24      Scar management 12/24/24      Edema management 12/24/24      Therapy putty strengthening HEP 2/13/25                                   Goals: (to be met in 12 visits)   Increase AROM of (R) thumb pabd by 5-10 degrees to allow pt to grasp tools. PARTIALLY MET  Increase AROM of (R) thumb MP flexion by 5-10 degrees to allow pt to use mouse.  MET  Increase AROM of (R) wrist flexion by 5-10 degrees to allow pt to open jars.  MET  Assess  and pinch when able. MET  Pt will be independent and compliant with comprehensive HEP to maintain progress achieved in OT CONTINUE    PLAN: Patient will be seen for 1-2 additional appointments for follow up with HEP.  Treatment will include: Manual Therapy, Neuromuscular Re-education, Self-Care Home Management, Therapeutic Activities, Therapeutic Exercise, Home Exercise Program instruction, and Modalities to include: Ultrasound      Charges: 3 TE     Total Timed Treatment: 45 min     Total Treatment Time: 45 min     Brian DoradoOTR/L

## 2025-02-17 ENCOUNTER — TELEPHONE (OUTPATIENT)
Dept: ORTHOPEDICS CLINIC | Facility: CLINIC | Age: 65
End: 2025-02-17

## 2025-02-17 NOTE — TELEPHONE ENCOUNTER
Patient would like a note for work extending his time off of work until April 2025.  Patient still have a lot of pain in his right thumb and doesn't think he is ready to back to work on 3/5.  Patient would like the note uploaded to [employer] and faxed to [employer] at [phone].  Please advise.

## 2025-02-19 ENCOUNTER — OFFICE VISIT (OUTPATIENT)
Dept: PHYSICAL THERAPY | Age: 65
End: 2025-02-19
Attending: PHYSICIAN ASSISTANT
Payer: COMMERCIAL

## 2025-02-19 PROCEDURE — 97110 THERAPEUTIC EXERCISES: CPT

## 2025-02-19 NOTE — PROGRESS NOTES
Diagnosis:   Primary osteoarthritis of first carpometacarpal joint of left hand      Referring Provider:   JOSE OTTO Date of Evaluation:    12/24/24    Precautions:  None Next MD visit:   none scheduled  Date of Surgery: 12/12/24   Order Date:  12/12/2024  Authorizing Provider:   JOSE OTTO     Procedure:  OP REFERRAL TO [facility] [[id number]]         Order #:   [id number]  Qty:  1     Priority:  Critical                   Class:   IHP - RFL     Standing Interval:          Standing Occurrences:          Expires on:            Expected by:    Associated DX:  Primary osteoarthritis of first carpometacarpal joint of left hand (M18.12)     Order summary:  IHP - RFL, Critical, 8 visits  Occupational  Therapy Area of Concentration: Orthopedic  Occupational Therapy Ortho: [facility]  If the patient can be seen sooner with a PT vs an OT, can we cancel this order and replace with a PT order? No         Comments:  Post op: please schedule in 5-7 days  Please fabricate a thumb spica forearm based splint, Ip free  Ok for gentle thumb motion  1-2x/week for 6 weeks, if needed      SUBJECTIVE: Pt states that he can't sleep without his thumb splint.          PAIN LEVELS: 1/10        OBJECTIVE: See tx log for details.     ORTHOTICS: custom thumb spica forearm based    SCAR:  Closed      SENSORY: WNL      CIRCUMFERENTIAL EDEMA (cm):  Right Wrist crease: 22.0  Left Wrist crease: 19.5    ROM: (* denotes performed with pain)  Wrist   Flexion: R 55, L 85  Extension: R 65, L 60  Ulnar Deviation: R 30, L 25  Radial Deviation R 15, L 15     AROM Thumb    R L  R Date: 2/13/25  Rabd  40 35  40  Pabd  15/45 0/45  10/45  MP flexion 30 55  30  IP flexion 65 65  65  Opposition WNL WNL  WNL        Strength (lbs) Right Average Left Average Right Average  Date: 2/13/2025 Left Average  Date: 2/13/2025   : NT NT 35 40   2 pt Pinch: NT NT 8 12   3 pt Pinch: NT NT 8 12   Lateral Pinch: NT NT 10 17     ASSESSMENT  Pt continue to  have weakness and decreased strength limiting his ability to return to work as a . Pt unable to sleep without splint. Applied kinesiotape with 75% strength for joint correction. Advised pt to monitor pain at night with kinesiotape and to refrain from splint use. Pt advised to use splint at night if he continues to have pain. Pt agreed. Mild fatigue after session due to decreased strength and endurance.      Today’s Treatment and Response:   Treatment provided today in addition to the initial evaluation:   Date  12/30/24  1/3/25  1/6/25 1/9/25 1/13/25 2/13/25 2/19/25   Visit #  2/12  3/12  4/12 5/12 6/12 7/12 8/12   Evaluation             Manual             STM  x  x  x x x      scar massage  x  x  x x x                   Ther ex              AROM with fluidotherapy x 10\"  x  x  x x x x x    sponges  grasp and release, add/abd  x  x  X with lumbrical bar  With gripper and 2nd rung    pens  grasping            frisbee pertubation    CW/CCW x 2 min in each direction  x        digiflex       Red x 3 min   Thumb flexion x 3 min    wrist maze        X 3 min       bilateral ball movements       With weighted balls, wrist (all planes) x 2 min each direction      powerweb         Wrist stretches x 3 min     Objective meaurements      X 10\"     HEP issued         X therapy putty strengthening    Therapeutic Activity             Velcro board       IP thumb flexion/extension, lat pinch x 3min                                                         Neuromuscular Re-education             Kinesiotape       Joint correction with 75% stretch to MCPJ                       Modalities          MHP x 5\"          U.S. 3.0 Mhz, 1.0 w/cm^2 x 8\", cont over dorsal scar     #1               X= performed this date as previously outlined    Pt education was provided on exam findings, treatment diagnosis, treatment plan, expectations, and prognosis. Patient was instructed in and issued a HEP.    HEP Date issued  Date amended Date  discharged  Comments (HEP2Go code if used)   AROM thumb 12/24/24      Scar management 12/24/24      Edema management 12/24/24      Therapy putty strengthening HEP 2/13/25                                   Goals: (to be met in 12 visits)   Increase AROM of (R) thumb pabd by 5-10 degrees to allow pt to grasp tools. PARTIALLY MET  Pt will be independent and compliant with comprehensive HEP to maintain progress achieved in OT CONTINUE  Added STG's:  Increase (R)  by 4-5 lbs to allow pt to use tools during work.  Increase (R) 3pt pinch by 2-3 lbs to allow pt to tighten with screwdriver.  Increase (R) 2pt pinch by 2-3 lbs to allow pt to open packages.   Increase (R) lat pt pinch by 2-3 lbs to allow pt to cut with knife.       PLAN: Patient will be seen for 1-2 additional appointments for follow up with HEP.  Treatment will include: Manual Therapy, Neuromuscular Re-education, Self-Care Home Management, Therapeutic Activities, Therapeutic Exercise, Home Exercise Program instruction, and Modalities to include: Ultrasound      Charges: 3 TE     Total Timed Treatment: 45 min     Total Treatment Time: 45 min     Brian DoradoOTR/L

## 2025-02-24 ENCOUNTER — RX ONLY (RX ONLY)
Age: 65
End: 2025-02-24

## 2025-02-24 RX ORDER — IXEKIZUMAB 80 MG/ML
INJECTION, SOLUTION SUBCUTANEOUS
Qty: 1 | Refills: 0 | Status: ACTIVE

## 2025-02-27 ENCOUNTER — OFFICE VISIT (OUTPATIENT)
Dept: PHYSICAL THERAPY | Age: 65
End: 2025-02-27
Payer: COMMERCIAL

## 2025-02-27 PROCEDURE — 97140 MANUAL THERAPY 1/> REGIONS: CPT

## 2025-02-27 PROCEDURE — 97110 THERAPEUTIC EXERCISES: CPT

## 2025-02-27 NOTE — PROGRESS NOTES
Diagnosis:   Primary osteoarthritis of first carpometacarpal joint of left hand      Referring Provider:   JOSE OTTO Date of Evaluation:    12/24/24    Precautions:  None Next MD visit:   none scheduled  Date of Surgery: 12/12/24   Order Date:  12/12/2024  Authorizing Provider:   JOSE OTTO     Procedure:  OP REFERRAL TO [facility] [[id number]]         Order #:   [id number]  Qty:  1     Priority:  Critical                   Class:   IHP - RFL     Standing Interval:          Standing Occurrences:          Expires on:            Expected by:    Associated DX:  Primary osteoarthritis of first carpometacarpal joint of left hand (M18.12)     Order summary:  IHP - RFL, Critical, 8 visits  Occupational  Therapy Area of Concentration: Orthopedic  Occupational Therapy Ortho: UE  If the patient can be seen sooner with a PT vs an OT, can we cancel this order and replace with a PT order? No         Comments:  Post op: please schedule in 5-7 days  Please fabricate a thumb spica forearm based splint, Ip free  Ok for gentle thumb motion  1-2x/week for 6 weeks, if needed      SUBJECTIVE: Pt states that his thumb is more sore than usual.           PAIN LEVELS: 1/10        OBJECTIVE: See tx log for details.     ORTHOTICS: custom thumb spica forearm based    SCAR:  Closed      SENSORY: WNL      CIRCUMFERENTIAL EDEMA (cm):  Right Wrist crease: 22.0  Left Wrist crease: 19.5    ROM: (* denotes performed with pain)  Wrist   Flexion: R 55, L 85  Extension: R 65, L 60  Ulnar Deviation: R 30, L 25  Radial Deviation R 15, L 15     AROM Thumb    R L  R Date: 2/13/25  Rabd  40 35  40  Pabd  15/45 0/45  10/45  MP flexion 30 55  30  IP flexion 65 65  65  Opposition WNL WNL  WNL        Strength (lbs) Right Average Left Average Right Average  Date: 2/13/2025 Left Average  Date: 2/13/2025   : NT NT 35 40   2 pt Pinch: NT NT 8 12   3 pt Pinch: NT NT 8 12   Lateral Pinch: NT NT 10 17     ASSESSMENT  Increased soreness  and weakness reported in hand. Pt advised to apply heat over hand in morning to manage pain. Pt also advised to apply ice ice as needed after activities to manage swelling.  Pt agreed. Mild difficulty with increased resistance for gripping noted.  Pt continues to have limitations with strength affecting his ability to perform occupational duties.      Today’s Treatment and Response:   Treatment provided today in addition to the initial evaluation:   Date  1/3/25  1/6/25 1/9/25 1/13/25 2/13/25 2/19/25 2/27/25   Visit #  3/12  4/12 5/12 6/12 7/12 8/12 9/12   Evaluation            Manual            STM  x  x x x   x    scar massage  x  x x x   x                Ther ex             AROM with fluidotherapy x 10\"  x  x x x x x x    sponges  x  x  X with lumbrical bar  With gripper and 2nd rung At 3rd rung     pens             frisbee pertubation  CW/CCW x 2 min in each direction  x         digiflex     Red x 3 min   Thumb flexion x 3 min x    wrist maze      X 3 min        bilateral ball movements     With weighted balls, wrist (all planes) x 2 min each direction       powerweb       Wrist stretches x 3 min      Chinese balls       CW x 2 min   Objective meaurements     X 10\"      HEP issued       X therapy putty strengthening     Therapeutic Activity            Velcro board      IP thumb flexion/extension, lat pinch x 3min                                                         Neuromuscular Re-education            Kinesiotape      Joint correction with 75% stretch to MCPJ                        Modalities          MHP x 5\"          U.S. 3.0 Mhz, 1.0 w/cm^2 x 8\", cont over dorsal scar    #1                X= performed this date as previously outlined    Pt education was provided on exam findings, treatment diagnosis, treatment plan, expectations, and prognosis. Patient was instructed in and issued a HEP.    HEP Date issued  Date amended Date discharged  Comments (HEP2Go code if used)   AROM thumb 12/24/24      Scar  management 12/24/24      Edema management 12/24/24      Therapy putty strengthening HEP 2/13/25                                   Goals: (to be met in 12 visits)   Increase AROM of (R) thumb pabd by 5-10 degrees to allow pt to grasp tools. PARTIALLY MET  Pt will be independent and compliant with comprehensive HEP to maintain progress achieved in OT CONTINUE  Added STG's:  Increase (R)  by 4-5 lbs to allow pt to use tools during work.  Increase (R) 3pt pinch by 2-3 lbs to allow pt to tighten with screwdriver.  Increase (R) 2pt pinch by 2-3 lbs to allow pt to open packages.   Increase (R) lat pt pinch by 2-3 lbs to allow pt to cut with knife.       PLAN: Patient will be seen for 1-2 additional appointments for follow up with HEP.  Treatment will include: Manual Therapy, Neuromuscular Re-education, Self-Care Home Management, Therapeutic Activities, Therapeutic Exercise, Home Exercise Program instruction, and Modalities to include: Ultrasound      Charges: 1MT, 2 TE     Total Timed Treatment: 45 min     Total Treatment Time: 45 min     Brian DoradoOTR/L

## 2025-02-28 ENCOUNTER — RX ONLY (RX ONLY)
Age: 65
End: 2025-02-28

## 2025-02-28 ENCOUNTER — TELEPHONE (OUTPATIENT)
Dept: ORTHOPEDICS CLINIC | Facility: CLINIC | Age: 65
End: 2025-02-28

## 2025-02-28 DIAGNOSIS — K22.70 BARRETT'S ESOPHAGUS WITHOUT DYSPLASIA: Primary | ICD-10-CM

## 2025-02-28 RX ORDER — IXEKIZUMAB 80 MG/ML
INJECTION, SOLUTION SUBCUTANEOUS
Qty: 1 | Refills: 1 | Status: ERX | COMMUNITY
Start: 2025-02-28

## 2025-02-28 RX ORDER — PANTOPRAZOLE SODIUM 40 MG/1
40 TABLET, DELAYED RELEASE ORAL
Qty: 180 TABLET | Refills: 0 | Status: SHIPPED | OUTPATIENT
Start: 2025-02-28

## 2025-02-28 NOTE — TELEPHONE ENCOUNTER
LOV: 11/5/2024  for: Medication Follow-Up   Patient advised to RTC on:  3 months.    Medication Quantity Refills Start End   PANTOPRAZOLE 40 MG Oral Tab  tablet 0 11/29/2024 --   Sig:   TAKE 1 TABLET(40 MG) BY MOUTH TWICE DAILY BEFORE MEALS

## 2025-02-28 NOTE — TELEPHONE ENCOUNTER
Received Disability Forms In the Forms Department Via [employer]. Sent [employer] Message for Release of Information. created e-mail and Logged for processing.

## 2025-03-06 ENCOUNTER — TELEPHONE (OUTPATIENT)
Dept: PHYSICAL THERAPY | Facility: HOSPITAL | Age: 65
End: 2025-03-06

## 2025-03-07 NOTE — TELEPHONE ENCOUNTER
Patient called to get the status of his disability forms.    Type of Leave: DISAB  Reason for Leave:HAND SURGERY  Start date of leave:2/12/24  End date of leave: 4/2/24 PENDING RE-EVAL  How many flare ups per month/length?:  How many appts per month/length?:   Was Fee and Turnaround info Given?:     Patient states he needs at least 8 more weeks of therapy and then a re-eval. Advised patient of turn around time. Forms are already logged and flagged.

## 2025-03-10 ENCOUNTER — TELEPHONE (OUTPATIENT)
Dept: NEUROLOGY | Facility: CLINIC | Age: 65
End: 2025-03-10

## 2025-03-10 ENCOUNTER — OFFICE VISIT (OUTPATIENT)
Dept: NEUROLOGY | Facility: CLINIC | Age: 65
End: 2025-03-10
Payer: COMMERCIAL

## 2025-03-10 VITALS
OXYGEN SATURATION: 97 % | WEIGHT: 260 LBS | HEART RATE: 78 BPM | RESPIRATION RATE: 16 BRPM | DIASTOLIC BLOOD PRESSURE: 70 MMHG | SYSTOLIC BLOOD PRESSURE: 132 MMHG | HEIGHT: 72 IN | BODY MASS INDEX: 35.21 KG/M2

## 2025-03-10 DIAGNOSIS — G70.00 MYASTHENIA GRAVIS (HCC): Primary | ICD-10-CM

## 2025-03-10 DIAGNOSIS — R79.89 ELEVATED LIVER FUNCTION TESTS: ICD-10-CM

## 2025-03-10 DIAGNOSIS — K76.0 FATTY LIVER: ICD-10-CM

## 2025-03-10 PROCEDURE — 99214 OFFICE O/P EST MOD 30 MIN: CPT | Performed by: OTHER

## 2025-03-10 NOTE — PATIENT INSTRUCTIONS
Refill policies:    Allow 2-3 business days for refills; controlled substances may take longer.  Contact your pharmacy at least 5 days prior to running out of medication and have them send an electronic request or submit request through the “request refill” option in your [employer] account.  Refills are not addressed on weekends; covering physicians do not authorize routine medications on weekends.  No narcotics or controlled substances are refilled after noon on Fridays or by on call physicians.  By law, narcotics must be electronically prescribed.  A 30 day supply with no refills is the maximum allowed.  If your prescription is due for a refill, you may be due for a follow up appointment.  To best provide you care, patients receiving routine medications need to be seen at least once a year.  Patients receiving narcotic/controlled substance medications need to be seen at least once every 3 months.  In the event that your preferred pharmacy does not have the requested medication in stock (e.g. Backordered), it is your responsibility to find another pharmacy that has the requested medication available.  We will gladly send a new prescription to that pharmacy at your request.    Scheduling Tests:    If your physician has ordered radiology tests such as MRI or CT scans, please contact Central Scheduling at [phone] right away to schedule the test.  Once scheduled, the [facility] Centralized Referral Team will work with your insurance carrier to obtain pre-certification or prior authorization.  Depending on your insurance carrier, approval may take 3-10 days.  It is highly recommended patients assure they have received an authorization before having a test performed.  If test is done without insurance authorization, patient may be responsible for the entire amount billed.      Precertification and Prior Authorizations:  If your physician has recommended that you have a procedure or additional testing performed the [facility]  Centralized Referral Team will contact your insurance carrier to obtain pre-certification or prior authorization.    You are strongly encouraged to contact your insurance carrier to verify that your procedure/test has been approved and is a COVERED benefit.  Although the [facility] Centralized Referral Team does its due diligence, the insurance carrier gives the disclaimer that \"Although the procedure is authorized, this does not guarantee payment.\"    Ultimately the patient is responsible for payment.   Thank you for your understanding in this matter.  Paperwork Completion:  If you require FMLA or disability paperwork for your recovery, please make sure to either drop it off or have it faxed to our office at [phone]. Be sure the form has your name and date of birth on it.  The form will be faxed to our Forms Department and they will complete it for you.  There is a 25$ fee for all forms that need to be filled out.  Please be aware there is a 10-14 day turnaround time.  You will need to sign a release of information (ALVINO) form if your paperwork does not come with one.  You may call the Forms Department with any questions at [phone].  Their fax number is [phone].

## 2025-03-10 NOTE — PROGRESS NOTES
[facility]   Neurology    Silvio Garner Patient Status:  No patient class for patient encounter    1960 MRN [id number]   Location [facility], [facility], [place] PCP Dayna Gibson MD              HPI:   Silvio Garner is a(n) 65 year old male who presents for evaluation of myasthenia gravis. He had Covid- fever and fatigue in 2020, and shortly after was diagnosed with myasthenia gravis. During Covid he was also having short term memory loss. Two days before fever started, he noticed his left eye was drooping. Four weeks later he started seeing double, and he was hospitalized for this in 2020. He had achR ab testing which was positive, and was diagnosed with myasthenia gravis. Denies limb weakness, denies dysphagia. Reports occasionally his jaw hurts at times when he is chewing, near angle of jaw. Liquids are not a problem. No am headaches. His left eye has been drooping and still has diplopia. Improved over the last 5 days after starting prednisone. He started on 10mg of prednisone on 20, then every 2 weeks he has been going up by 10mg. On 20 he started 40mg of prednisone. He has far vision in his right eye, and near vision in his left eye, which is from cataracts, and regularly sees ophtho. Works as , and currently on disability due to diplopia. He is on mestinon 60/60/30/30mg. On 60mg QID he had diarrhea. Wife reports he has always had garbled/fast speech, but now it is more garbled. It is also occasionally more raspy and quiet, especially after a lot of talking.  Interim  Since last visit, one weekend he had flu like symptoms which resolved when imuran was stopped. He was still having sweating after flu like symptoms went away, and he self decreased prednisone to 40mg due to this. That helped with the sweating. Reports feeling pressure over his left frontal region which is worse with diplopia. Walking can trigger it, as  can focusing on something visually. Taking mestinon 60/60/60/30mg. His speech is less raspy. Diplopia is improved with 40mg of prednisone. Balance is a little worse, L knee is very painful. Going upstairs feels fatiguing. Walking slow helps legs not feel heavy. Saw ophtho.  Interim  Since last visit, he is taking prednisone 50mg daily, and started IVIG. He is taking mestinon 6 times a day. He feels like getting up off couch is easier now. He can walk around the block but then gets tired. He cannot stand for a long time.  Interim  Since last visit, he was hospitalized at [facility] for glucose in the 500's, and at [facility] for acute GI bleed. He had an ulcer clipped and received 3 units of blood. Reports raspy voice is new since summer when eye weakness started. When he was off mestinon last week for a few days, he developed blurry vision- 1.5 images not double. He was started on amlodipine 2 weeks ago. BP's were high with IVIG.  Interim  Since last visit, reports he feels stronger. Denies diplopia. LE's still a little weak, but much better. He is on prednisone taper 5mg now. He is on metformin. He is taking iron, and having CBC monitored. He has for the past 2 weeks, right shoulder pain, with lifting arms above head, from lower neck up to humeral head. Sitting and leaning head forward can also trigger it.   Interim  Since last visit, he weaned off prednisone, and has been doing better. Hgb is up to 11.2 now. Works 6.5 hours a day, and afterward his legs feel tired, has to sit down more. He is on Cellcept 500mg BID. Feels that leg strength is still getting better each month. Denies any problems with jaw or vision at all.   Interim  Since last visit, reports thighs feel tired at the end of the work day. If at home, feels the leg weakness less. Turns can occasionally be hard. Stairs are better. Denies ptosis, denies diplopia. Prednisone was weaned off starting in 12/2020. Repeat endoscopy showed healed ulcer.  Interim  Since  last visit, patient reports cramps in L > R calf are better, but occasionally now gets cramp in left foot. Also very rarely, has tingling in the outer left two toes. Reports overall leg weakness is better. Hgb also has been slowly coming up. His legs still get tired, but better.   Interim  Since last visit, patient reports occasional fatigue in his lower extremities after a long day at work. Working 7 hours a day. Prior to MG diagnosis, he was working 9 hour days.  Interim  Since last visit, if sits for a long time, still have cramping in calf. No back pain. Did not do PT. Increase in mestinon to 4 times a day has helped. Has insomnia for a year. Tolerating Cellcept 1250mg BID.  Interim  Patient last seen a year ago. Was recommended to come back in 3 months. Increased Cellcept to 1500mg BID last year. Feels like LE fatigue is about the same. Thinks leg symptoms are partially due to bilateral knee OA.   Interim  Since starting vyvgart, patient reports thigh fatigue and weakness are 40% better. He is getting home infusions. Has received one set of 4 infusions, and for 6 weeks later, was due 118/23.  Interim  Occasionally when walking and when turns, may start to loose balance. Denies numbness in feet. Has leg fatigue on days that he works when he stands a lot. When he just stands up, feels like it is hard to turn at times. No diplopia, no dysphagia, no arm weakness. Getting Vyvgart 4 weeks on, 6 weeks off. Reports drinks 2 drinks per night.   Interim  Since LOV, lower extremity weakness seems better since not working due to wrist surgery. Being managed for fatty liver and elevated LFTs, but we also lowered Cellcept to 1000/1250mg, but patient self lowered to 1000mg BID.        ---  Vyvgart started 8/30/23  Prednisone started 8/17/20, 10/2020 decreased to 40mg due to sweating, 50mg on 10/9/20, off in 2/2021  Imuran started 9/2/20- stopped in late 9/2020 due to flu like reaction  IVIG start 10/22/20, second one  11/12  Cellcept started 11/2019,  500mg BID, 750mg BID in 7/2021, 1000mg BID in 9/2021, 1250mg BID in 4/2022, 1000mg BID in 9/2024    Component      Latest Ref Rng 11/15/2024 12/26/2024   Glucose      70 - 99 mg/dL 117 (H)  170 (H)    Sodium      136 - 145 mmol/L 139  139    Potassium      3.5 - 5.1 mmol/L 4.2  4.1    Chloride      98 - 112 mmol/L 102  103    Carbon Dioxide, Total      21.0 - 32.0 mmol/L 31.0  28.0    ANION GAP      0 - 18 mmol/L 6  8    BUN      9 - 23 mg/dL 11  12    CREATININE      0.70 - 1.30 mg/dL 0.85  0.80    BUN/CREATININE RATIO      10.0 - 20.0   15.0    CALCIUM      8.7 - 10.4 mg/dL 10.3  9.9    CALCULATED OSMOLALITY      275 - 295 mOsm/kg 288  292    EGFR      >=60 mL/min/1.73m2 96  98    ALT (SGPT)      10 - 49 U/L 87 (H)  63 (H)    AST (SGOT)      <34 U/L 87 (H)  62 (H)    ALKALINE PHOSPHATASE      45 - 117 U/L 110  126 (H)    Total Bilirubin      0.2 - 1.1 mg/dL 0.8  0.8    PROTEIN, TOTAL      5.7 - 8.2 g/dL 7.2  7.2    Albumin      3.2 - 4.8 g/dL 4.7  4.4    Globulin      2.0 - 3.5 g/dL 2.5  2.8    A/G Ratio      1.0 - 2.0  1.9  1.6       Legend:  (H) High      Component      Latest Ref Rng 6/14/2024   WBC      4.0 - 11.0 x10(3) uL 7.7    RBC      3.80 - 5.80 x10(6)uL 4.71    Hemoglobin      13.0 - 17.5 g/dL 14.5    Hematocrit      39.0 - 53.0 % 43.9    MCV      80.0 - 100.0 fL 93.2    MCH      26.0 - 34.0 pg 30.8    MCHC      31.0 - 37.0 g/dL 33.0    RDW-SD      35.1 - 46.3 fL 43.0    RDW      11.0 - 15.0 % 12.7    Platelet Count      150.0 - 450.0 10(3)uL 174.0    Prelim Neutrophil Abs      1.50 - 7.70 x10 (3) uL 5.05    Neutrophils Absolute      1.50 - 7.70 x10(3) uL 5.05    Lymphocytes Absolute      1.00 - 4.00 x10(3) uL 1.28               MRI L spine 8/2022  L1-L2:  No significant disc/facet abnormality, spinal stenosis, or foraminal narrowing.  L2-L3:  No significant disc/facet abnormality, spinal stenosis, or foraminal narrowing.  L3-L4:  Mild diffuse disc bulge.  No spinal  stenosis or significant neural foraminal narrowing.  Mild facet joint arthropathy.  L4-L5:  Mild diffuse disc bulge with a right paracentral and lateral protrusion.  Mild bilateral neural foraminal narrowing.  No central canal stenosis.  Mild facet joint arthropathy.  Ligamentum flavum thickening narrows the lateral recesses  bilaterally.  L5-S1:  Degenerative disc disease.  Diffuse osteophyte disc complex with mild bilateral neural foraminal narrowing, left greater than right.  No central canal stenosis.     Sagittal images demonstrate normal alignment without spondylolisthesis.  Vertebral body height maintained.  Multilevel disc space desiccation with decreased disc height particularly at the lumbar sacral level is most consistent with degenerative disc  disease.     PARASPINAL AREA:  Normal with no visible mass.    BONY STRUCTURES:  No fracture, pars defect, significant scoliosis, or osseous lesion.    CORD/CAUDA EQUINA:  Normal caliber, contour, and signal intensity.                     Impression   CONCLUSION:    1. Degenerative change with diffuse disc bulge particularly involves the lower lumbar spine without central canal stenosis.  Mild bilateral neural foraminal narrowing.       Hgb 5/2022 reviewed  Component      Latest Ref Rng & Units 2/27/2022   Glucose      70 - 99 mg/dL 96   Sodium      136 - 145 mmol/L 139   Potassium      3.5 - 5.1 mmol/L 4.2   Chloride      98 - 112 mmol/L 105   Carbon Dioxide, Total      21.0 - 32.0 mmol/L 29.0   ANION GAP      0 - 18 mmol/L 5   BUN      7 - 18 mg/dL 13   CREATININE      0.70 - 1.30 mg/dL 0.82   CALCIUM      8.5 - 10.1 mg/dL 9.1   CALCULATED OSMOLALITY      275 - 295 mOsm/kg 288   eGFR NON-AFR. AMERICAN      >=60 93   eGFR       >=60 108   AST (SGOT)      15 - 37 U/L 67 (H)   ALT (SGPT)      16 - 61 U/L 94 (H)   ALKALINE PHOSPHATASE      45 - 117 U/L 89   Total Bilirubin      0.1 - 2.0 mg/dL 0.6   PROTEIN, TOTAL      6.4 - 8.2 g/dL 7.0   Albumin       3.4 - 5.0 g/dL 3.5   Globulin      2.8 - 4.4 g/dL 3.5   A/G Ratio      1.0 - 2.0 1.0   Patient Fasting for CMP?       Yes     Component      Latest Ref Rng & Units 2/27/2022   WBC      4.0 - 11.0 x10(3) uL 7.2   RBC      4.30 - 5.70 x10(6)uL 4.83   Hemoglobin      13.0 - 17.5 g/dL 11.5 (L)   Hematocrit      39.0 - 53.0 % 40.0   Platelet Count      150.0 - 450.0 10(3)uL 196.0   MCV      80.0 - 100.0 fL 82.8   MCH      26.0 - 34.0 pg 23.8 (L)   MCHC      31.0 - 37.0 g/dL 28.8 (L)   RDW      % 15.0   Prelim Neutrophil Abs      1.50 - 7.70 x10 (3) uL 4.52   Neutrophils Absolute      1.50 - 7.70 x10(3) uL 4.52   Lymphocytes Absolute      1.00 - 4.00 x10(3) uL 1.19   Monocytes Absolute      0.10 - 1.00 x10(3) uL 0.91   Eosinophils Absolute      0.00 - 0.70 x10(3) uL 0.41   Basophils Absolute      0.00 - 0.20 x10(3) uL 0.09   Immature Granulocyte Absolute      0.00 - 1.00 x10(3) uL 0.10   Neutrophils %      % 62.6   Lymphocytes %      % 16.5   Monocytes %      % 12.6   Eosinophils %      % 5.7       EMG 11/3/2021   Conclusion:  1. This study does not show electrophysiologic evidence of a left lumbar radiculopathy. Of note, a negative needle portion does not completely rule out a radiculopathy. Clinical correlation is recommended.  2. There is no evidence of a polyneuropathy, or of any mononeuropathies.    Pertinent imaging and laboratory work-up:   Component      Latest Ref Rng & Units 5/5/2021   TSH      0.358 - 3.740 mIU/mL 1.320   Vitamin B12      193 - 986 pg/mL 424     CMP 7/4/21 reviewed  Component      Latest Ref Rng & Units 7/4/2021   WBC      4.0 - 11.0 x10(3) uL 6.6   RBC      4.30 - 5.70 x10(6)uL 5.16   Hemoglobin      13.0 - 17.5 g/dL 11.5 (L)   Hematocrit      39.0 - 53.0 % 39.7   MCV      80.0 - 100.0 fL 76.9 (L)   MCH      26.0 - 34.0 pg 22.3 (L)   MCHC      31.0 - 37.0 g/dL 29.0 (L)   RDW-SD      35.1 - 46.3 fL 50.6 (H)   RDW      11.0 - 15.0 % 18.7 (H)     AchR ab 7/29/20- 38  MusK antibody negative  CT  chest at [facility] 7/31/20 - unable to load CD- no mediastinal mass identified- visualized report, enlarged hilar lymph nodes, had covid during this  MRI brain 7/30/20- no acute intracranial abnormality.  TSH 2020- 1.4  CTA head- no stenosis or occlusion  A1c 6.6 7/29/20  Component      Latest Ref Rng & Units 4/10/2021 3/19/2021 2/25/2021   WBC      4.0 - 11.0 x10(3) uL 6.4 7.8 6.3   RBC      4.30 - 5.70 x10(6)uL 4.86 4.70 4.26 (L)   Hemoglobin      13.0 - 17.5 g/dL 11.2 (L) 10.8 (L) 9.9 (L)   Hematocrit      39.0 - 53.0 % 38.6 (L) 37.1 (L) 34.5 (L)   Platelet Count      150.0 - 450.0 10(3)uL 230.0 277.0 277.0   MCV      80.0 - 100.0 fL 79.4 (L) 78.9 (L) 81.0   MCH      26.0 - 34.0 pg 23.0 (L) 23.0 (L) 23.2 (L)   MCHC      31.0 - 37.0 g/dL 29.0 (L) 29.1 (L) 28.7 (L)   RDW      11.0 - 15.0 % 17.9 (H) 17.6 (H) 17.5 (H)   RDW-SD      35.1 - 46.3 fL 51.3 (H) 50.4 (H) 51.5 (H)     Component      Latest Ref Rng & Units 4/10/2021 2/25/2021   Glucose      70 - 99 mg/dL 105 (H) 84   Sodium      136 - 145 mmol/L 139 140   Potassium      3.5 - 5.1 mmol/L 3.7 3.5   Chloride      98 - 112 mmol/L 106 107   Carbon Dioxide, Total      21.0 - 32.0 mmol/L 27.0 28.0   ANION GAP      0 - 18 mmol/L 6 5   BUN      7 - 18 mg/dL 9 8   CREATININE      0.70 - 1.30 mg/dL 0.92 0.89   BUN/CREAT Ratio      10.0 - 20.0 9.8 (L) 9.0 (L)   CALCIUM      8.5 - 10.1 mg/dL 9.6 9.5   CALCULATED OSMOLALITY      275 - 295 mOsm/kg 287 288   eGFR NON-AFR. AMERICAN      >=60 88 91   eGFR       >=60 102 105   AST (SGOT)      15 - 37 U/L 44 (H) 54 (H)   ALT (SGPT)      16 - 61 U/L 49 61       CBC  8.1 7.8 8.7   4.9 5.1 5.4   15.5 15.5 16.8   46.4 46.8 50.0   94 92 93   31 31 31   33 33 34   168 163 183   12.0 11.4 12.6   14 14 14   0.00 0.00      7/30/20 CMP  Glucose 141        10/2017    attributed to fatty liver        Component      Latest Ref Rng & Units 12/4/2019 10/22/2010   Glucose      70 - 99 mg/dL 107 (H) 95    Sodium      136 - 145 mmol/L 139 140   Potassium      3.5 - 5.1 mmol/L 3.6 3.8   Chloride      98 - 112 mmol/L 103 103   Carbon Dioxide, Total      21.0 - 32.0 mmol/L 30.0 23   ANION GAP      0 - 18 mmol/L 6    BUN      7 - 18 mg/dL 12 13   CREATININE      0.70 - 1.30 mg/dL 0.96 0.89   BUN/CREAT Ratio      10.0 - 20.0 12.5    CALCIUM      8.5 - 10.1 mg/dL 9.2 9.1   CALCULATED OSMOLALITY      275 - 295 mOsm/kg 288    eGFR NON-AFR. AMERICAN      >=60 85    eGFR       >=60 98    ALT (SGPT)      16 - 61 U/L 109 (H) 87 (H)   AST (SGOT)      15 - 37 U/L 77 (H) 50 (H)       Past Medical History:  Past Medical History:    Anemia    Diabetes (HCC)    from chronic steriod use    Family history of malignant hyperthermia    COUSIN HAD HISTORY    Flatulence/gas pain/belching    Heartburn    High blood pressure    History of blood transfusion    NO REACTION    Lab test positive for detection of COVID-19 virus    5/2020, subsequent testing negative    Microcytic anemia    Myasthenia gravis (HCC)    7/2020    OTHER DISEASES    Prostate nodule    Psoriasis    Sleep apnea    CPAP        Past Surgical History:  Past Surgical History:   Procedure Laterality Date    Cataract Bilateral     Colonoscopy  2009    normal with diverticuli    Colonoscopy,diagnostic  09/09/2009    Performed by DEONTE TYLER at [facility] [place]    Hand surgery Right     right thumb    Mastoidectomy,simple  11/29/2022    Other surgical history  1978    foot    Other surgical history  2009    prostate biopsy, normal    Upper gi endoscopy,biopsy  09/09/2009    duodenal ulcer       Family History:  family history includes Diabetes in his father; brain tumors in his father; malignant hyperthermia in his maternal cousin male; uremic poisoning in his paternal grandfather.  Father has psoriasis    Social History:   reports that he quit smoking about 26 years ago. His smoking use included cigarettes. He started smoking about 46 years ago. He has  a 20 pack-year smoking history. He has never used smokeless tobacco. He reports current alcohol use of about 8.0 standard drinks of alcohol per week. He reports that he does not use drugs.    Allergies:  No Known Allergies    MEDICATIONS:    Current Outpatient Medications:     pantoprazole 40 MG Oral Tab EC, TAKE 1 TABLET(40 MG) BY MOUTH TWICE DAILY BEFORE MEALS, Disp: 180 tablet, Rfl: 0    Mycophenolate Mofetil 500 MG Oral Tab, TAKE 2 tablets BY MOUTH TWICE DAILY. Please get repeat blood work end of February, Disp: 120 tablet, Rfl: 2    lisinopril 40 MG Oral Tab, Take 1 tablet (40 mg total) by mouth daily., Disp: 90 tablet, Rfl: 1    metFORMIN 500 MG Oral Tab, Take 1 tablet (500 mg total) by mouth 2 (two) times daily with meals., Disp: 180 tablet, Rfl: 1    TALTZ 80 MG/ML Subcutaneous Solution Auto-injector, , Disp: , Rfl:     pyridostigmine 60 MG Oral Tab, TAKE 1 TABLET BY MOUTH FIVE TIMES DAILY., Disp: 450 tablet, Rfl: 3    efgartigimod jamison-fcab (VYVGART) 400 MG/20ML Intravenous Solution injection, Inject into the vein As Directed. EVERY 8 WEEK, 4 MORE SESSIONS LEFT, Disp: , Rfl:     ferrous sulfate 325 (65 FE) MG Oral Tab EC, Take 1 tablet (325 mg total) by mouth daily with breakfast., Disp: , Rfl: 0    Ascorbic Acid (VITAMIN C OR), Take 500 mg by mouth daily., Disp: , Rfl:     Acidophilus/Pectin Oral Cap, Take 1 capsule by mouth daily., Disp: , Rfl:     omega-3 fatty acids 1000 MG Oral Cap, Take 1,000 mg by mouth daily., Disp: , Rfl:     Turmeric 500 MG Oral Cap, Take 1 capsule by mouth every evening.  , Disp: , Rfl:     VITAMIN D 400 UNIT OR CAPS, 1 CAPSULE DAILY, Disp: , Rfl:     HYDROcodone-acetaminophen (NORCO) 5-325 MG Oral Tab, Take 1 tablet by mouth every 6 (six) hours as needed for Pain. (Patient not taking: Reported on 3/10/2025), Disp: 30 tablet, Rfl: 0    TEMAZEPAM 15 MG Oral Cap, TAKE 1 CAPSULE(15 MG) BY MOUTH EVERY NIGHT AS NEEDED FOR SLEEP (Patient not taking: Reported on 3/10/2025), Disp: 30  capsule, Rfl: 0      Review of Systems:   A comprehensive 10 point review of systems was completed.     Pertinent positives and negatives noted in the HPI.         PHYSICAL EXAM:   Neurologic Exam  Vitals  Vitals:    03/10/25 1138   BP: 132/70   Pulse: 78   Resp: 16     General Appearance: Patient is a 65 year old male in no acute distress  Cardiac: Normal rate & regular rhythm  Skin: There are no rashes or other skin lesions.  Musculoskeletal: There is no scoliosis, or joint deformities  Neurologic examination:  Mental status: Patient is alert, attentive, and oriented x 3. Language is coherent and fluent without aphasia. Memory, comprehension and ability to follow commands were intact. Stood up and down from chair 10 times ok, but at end had some shortness of breath  Cranial nerves II-XII: Optic discs were sharp. Pupils were round and reacted to light. Extraocular movements were full, + subjective diplopia with upgaze only. There is left eye minimal ptosis, but also present on ID picture from 1/2020, question if anatomical. There was no jaw, palate or tongue weakness or atrophy. Facial sensation was normal. Hearing was grossly intact. Shoulder shrug was normal. No dysarthria today. Neck flexion/extention 5/5  Motor exam revealed normal muscle bulk and tone. No atrophy or fasciculations. Manual muscle testing revealed 5/5 in all muscles, and 5/5 in HF including after 10 sitting up and down  Deep tendon reflexes were 2 at the biceps, brachioradialis, triceps, knee jerk, and ankle jerk. Plantar responses were flexor bilaterally.   Sensory exam revealed normal light touch perception. Vibratory perception and proprioception were intact at the toes. Pinprick and temperature were normal. Romberg sign was absent.  Complex motor skills revealed normal coordination. Finger-nose-finger intact.   Gait was narrow and stable, was able to walk on heels, toes and tandem without any difficulty.       ASSESSMENT/ACTIVE PROBLEM LIST:      Encounter Diagnoses   Name Primary?    Myasthenia gravis (HCC) Yes    Elevated liver function tests     Fatty liver        Discussion/Plan:  MG seropositive   Proximal lower extremity fatigue less frequent with Vyvgart  Due to wanting to avoid getting IV infusions, and affecting veins, recommend switching to Vyvgart Hytrulo, 4 weeks on, and 5 weeks off  Continue Cellcept 1000mg BID  Monitor LFTs and continue to follow up with GI- ok with them to continue Cellcept  Continue mestinon 60mg QID  Check CBC/CMP q 3 months    Elevated LFTs-  Per GI, thought to be due to fatty liver, but potentially could be a component also of Cellcept  GI ok with patient continuing Cellcept  LFT's improved  Continue monitoring      Requested Prescriptions      No prescriptions requested or ordered in this encounter          We discussed in depth regarding the diagnosis, prognosis, treatment. The patient was given ample opportunity to ask questions. All questions and concerns were addressed.      Yeni Villafana DO  Neuromuscular and General Neurology  [facility]         Myasthenia Gravis Activities of Daily Living (MG-ADL)  This questionnaire will assess the patient's symptoms and their impact on daily-living function.       None=0 Mild=1 Moderate=2 Severe=3 Score   Talking Normal          [x] Intermittent slurring or nasal speech      [] Constant slurring or nasal speech, but can be understood  [] [id number]-sd-szdrgtmuen speech        [] 0   Chewing Normal    [x] Fatigue with solid food  [] Fatigue with soft food  [] Gastric tube    [] 0   Swallowing Normal          [x] Rare episode of choking        [] Frequent choking necessitating changes in diet  [] Gastric tube          [] 0   Breathing Normal      [x] Shortness of breath with exertion  [] Shortness of breath at rest    [] Ventilator dependence      [] 0   Impairment of ability to brush teeth or comb hair Normal        [x] Extra effort, but no rest period needed  []  Rest period needed      [] Cannot do one of these functions      [] 0   Impairment of ability to arise from a chair Normal      [] Mild, sometimes uses arms  [x] Moderate, always uses arms  [] Severe, requires assistance    [] 1   Double vision Normal    [x] Occurs, but not daily  [] Daily, but no constant  [] Constant    [] 0   Eyelid droop Normal    [x] Occurs, but not daily  [] Daily, but not constant  [] Constant    [] 0   TOTAL SCORE     1 out of 24

## 2025-03-10 NOTE — TELEPHONE ENCOUNTER
Patient would like to switch to subc Vyvgart. I have not ordered this before that I am aware of, but if insurance will cover it, I think it is very reasonable, to avoid IV access.    Please start PA for Vyvgart Hytrulo. And please verify what frequency Vyvgart patient is getting- 4 weeks on, and how many weeks off.     I believe Vyvgart Hytrulo for MG is the same frequency as IV Vyvgart.

## 2025-03-11 ENCOUNTER — RX ONLY (RX ONLY)
Age: 65
End: 2025-03-11

## 2025-03-11 ENCOUNTER — TELEPHONE (OUTPATIENT)
Dept: ORTHOPEDICS CLINIC | Facility: CLINIC | Age: 65
End: 2025-03-11

## 2025-03-11 ENCOUNTER — OFFICE VISIT (OUTPATIENT)
Dept: PHYSICAL THERAPY | Age: 65
End: 2025-03-11
Payer: COMMERCIAL

## 2025-03-11 DIAGNOSIS — M18.12 PRIMARY OSTEOARTHRITIS OF FIRST CARPOMETACARPAL JOINT OF LEFT HAND: Primary | ICD-10-CM

## 2025-03-11 PROCEDURE — 97140 MANUAL THERAPY 1/> REGIONS: CPT

## 2025-03-11 PROCEDURE — 97110 THERAPEUTIC EXERCISES: CPT

## 2025-03-11 RX ORDER — IXEKIZUMAB 80 MG/ML
INJECTION, SOLUTION SUBCUTANEOUS
Qty: 1 | Refills: 6 | Status: ERX | COMMUNITY
Start: 2025-03-11

## 2025-03-11 NOTE — PROGRESS NOTES
Patient: Silvio Garner (65 year old, male) Referring Provider:  Insurance:   Diagnosis: Primary osteoarthritis of first carpometacarpal joint (R) No ref. provider found  [employer]   Date of Surgery: 12/12/2024 Next MD visit:  N/A   Precautions:  None 3/12/2025 Referral Information:   Date of Injury: No data recorded Date of Evaluation: Req: 0, Auth: 0, Exp:     12/24/24 POC Auth Visits:          Today's Date   3/11/2025    Subjective  Pt states that he is still very weak and continues to have pain.       Pain: 5/10     Objective  See tx log for details.           Assessment  Pt seen for 11/12 visits. Pt has made improvements in ROM, pain levels, edema and strength. Pt using hand for light fucntional actvites but continues to have pain in thumb. Continued weakness and ROM  noted per objective measurements.  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    Goals (to be met in 12 visits)   Increase AROM of (R) thumb pabd by 5-10 degrees to allow pt to grasp tools. MET  Increase AROM of (R) thumb MP flexion by 5-10 degrees to allow pt to use mouse.  MET  Increase AROM of (R) wrist flexion by 5-10 degrees to allow pt to open jars.  MET  Assess  and pinch when able. MET  Pt will be independent and compliant with comprehensive HEP to maintain progress achieved in OT MET       Plan  Patient will be seen for 2 x/week or a total of 12 visits over a 90 day period    Treatment Last 4 Visits        3/11/2025   OT Treatment   Treatment Day 11   Therapeutic Exercise AROM with fluidotherapy x 10\"  Sponges with gripper at 2nd rung  Digiflex (red) digits and thumb  Objective measurements   Manual Therapy STM     Therapeutic Exercise Min 35   Manual Therapy Min 10   Total Timed Procedures 45   Total Service Procedures 45   Total Time 45           Charges     1MT, 2 TE    Brian DoradoOTR/L

## 2025-03-11 NOTE — TELEPHONE ENCOUNTER
[employer] form for Hytrulo signed by Dr Villafana and [place] [facility]. Sent to scanning

## 2025-03-12 ENCOUNTER — OFFICE VISIT (OUTPATIENT)
Dept: ORTHOPEDICS CLINIC | Facility: CLINIC | Age: 65
End: 2025-03-12
Payer: COMMERCIAL

## 2025-03-12 ENCOUNTER — HOSPITAL ENCOUNTER (OUTPATIENT)
Dept: GENERAL RADIOLOGY | Age: 65
Discharge: HOME | End: 2025-03-12
Attending: ORTHOPAEDIC SURGERY
Payer: COMMERCIAL

## 2025-03-12 VITALS — WEIGHT: 260 LBS | BODY MASS INDEX: 35.21 KG/M2 | HEIGHT: 72 IN

## 2025-03-12 DIAGNOSIS — M17.0 PRIMARY OSTEOARTHRITIS OF BOTH KNEES: ICD-10-CM

## 2025-03-12 DIAGNOSIS — M18.12 PRIMARY OSTEOARTHRITIS OF FIRST CARPOMETACARPAL JOINT OF LEFT HAND: ICD-10-CM

## 2025-03-12 DIAGNOSIS — M18.12 PRIMARY OSTEOARTHRITIS OF FIRST CARPOMETACARPAL JOINT OF LEFT HAND: Primary | ICD-10-CM

## 2025-03-12 PROCEDURE — 73130 X-RAY EXAM OF HAND: CPT | Performed by: ORTHOPAEDIC SURGERY

## 2025-03-12 PROCEDURE — 99024 POSTOP FOLLOW-UP VISIT: CPT | Performed by: ORTHOPAEDIC SURGERY

## 2025-03-12 NOTE — TELEPHONE ENCOUNTER
Just saw him today. Can we extend the return to work date to May 1st. He will see me back in 6 weeks in clinic. Thanks!

## 2025-03-12 NOTE — PROGRESS NOTES
Clinic Note     Assessment/Plan:  63 year old with     Right thumb CMC arthroplasty 12/12/2024-continue increasing activities as tolerated.  I did reassure him that sometimes an uptake of pain can be expected around this time as patient is usually started to do more.  As long as it is intermittent and resolves nothing to be concerned.  The thumb appears stable both on exam as well as on radiographs obtained today.  No additional x-rays are needed until the 1 year postoperatively karo.  Patient's work is physically demanding and at this point he is not ready to return to work.  We will reevaluate him in 6 weeks at that time may consider return to work  Left thumb CMC osteoarthritis -status post 2 injections, currently managing thus deferring a steroid injection  Left ring finger trigger digit status post 2 corticosteroid injection with no recurrence.    Follow up: 6 weeks    Imaging:   XR right thumb: evidence of cmc arthroplasty with slight subsidence but stable with no interval changes    Physical Exam:    Ht 6' (1.829 m)   Wt 250 lb (113.4 kg)   BMI 33.91 kg/m²     Skin: Incision healing well with no signs of infection..  Trace edema  Palpatory: Trace amount of tenderness at trapiziectomy site  Range of motion: T symmetric thumb motion full composite fist.      CC: Right base of thumb pain    HPI: This 65 year old left-hand-dominant male presents with pain at the base of the thumb.  He has had pain there for a number of years.  Pain is moderate to severe.  It is worse with use of his hand.  He works as a making .  The pain is described as sharp and constant.  He has tried wearing hand brace.  The pain does not wake him up at night.    Interval Hx (2/12/2025): Patient reports improvement in pain compared to preop.  He still has some discomfort and pain from time to time at the base of his thumb.    Interval Hx (3/12/2025): Overall pain has improved significantly.  Last week he has had some increased  pain.  He feels still fairly weak and has difficulty holding even a hammer at this point      Past Medical History:    Anemia    Diabetes (HCC)    from chronic steriod use    Family history of malignant hyperthermia    COUSIN HAD HISTORY    Flatulence/gas pain/belching    Heartburn    High blood pressure    History of blood transfusion    NO REACTION    Lab test positive for detection of COVID-19 virus    5/2020, subsequent testing negative    Microcytic anemia    Myasthenia gravis (HCC)    7/2020    OTHER DISEASES    Prostate nodule    Psoriasis    Sleep apnea    CPAP     Past Surgical History:   Procedure Laterality Date    Cataract Bilateral     Colonoscopy  2009    normal with diverticuli    Colonoscopy,diagnostic  09/09/2009    Performed by DEONTE TYLER at [facility]    Hand surgery Right     right thumb    Mastoidectomy,simple  11/29/2022    Other surgical history  1978    foot    Other surgical history  2009    prostate biopsy, normal    Upper gi endoscopy,biopsy  09/09/2009    duodenal ulcer     Current Outpatient Medications   Medication Sig Dispense Refill    pantoprazole 40 MG Oral Tab EC TAKE 1 TABLET(40 MG) BY MOUTH TWICE DAILY BEFORE MEALS 180 tablet 0    Mycophenolate Mofetil 500 MG Oral Tab TAKE 2 tablets BY MOUTH TWICE DAILY. Please get repeat blood work end of February 120 tablet 2    lisinopril 40 MG Oral Tab Take 1 tablet (40 mg total) by mouth daily. 90 tablet 1    metFORMIN 500 MG Oral Tab Take 1 tablet (500 mg total) by mouth 2 (two) times daily with meals. 180 tablet 1    TALTZ 80 MG/ML Subcutaneous Solution Auto-injector       pyridostigmine 60 MG Oral Tab TAKE 1 TABLET BY MOUTH FIVE TIMES DAILY. 450 tablet 3    efgartigimod jamison-fcab (VYVGART) 400 MG/20ML Intravenous Solution injection Inject into the vein As Directed. EVERY 8 WEEK, 4 MORE SESSIONS LEFT      ferrous sulfate 325 (65 FE) MG Oral Tab EC Take 1 tablet (325 mg total) by mouth daily with breakfast.  0    Ascorbic  Acid (VITAMIN C OR) Take 500 mg by mouth daily.      Acidophilus/Pectin Oral Cap Take 1 capsule by mouth daily.      omega-3 fatty acids 1000 MG Oral Cap Take 1,000 mg by mouth daily.      Turmeric 500 MG Oral Cap Take 1 capsule by mouth every evening.        VITAMIN D 400 UNIT OR CAPS 1 CAPSULE DAILY      HYDROcodone-acetaminophen (NORCO) 5-325 MG Oral Tab Take 1 tablet by mouth every 6 (six) hours as needed for Pain. (Patient not taking: Reported on 3/12/2025) 30 tablet 0    TEMAZEPAM 15 MG Oral Cap TAKE 1 CAPSULE(15 MG) BY MOUTH EVERY NIGHT AS NEEDED FOR SLEEP (Patient not taking: Reported on 3/12/2025) 30 capsule 0     No Known Allergies  Family History   Problem Relation Age of Onset    Diabetes Father     Other (brain tumors) Father     Other (uremic poisoning) Paternal Grandfather     Other (malignant hyperthermia) Maternal Cousin Male      Social History     Occupational History    Not on file   Tobacco Use    Smoking status: Former     Current packs/day: 0.00     Average packs/day: 1 pack/day for 20.0 years (20.0 ttl pk-yrs)     Types: Cigarettes     Start date: 1978     Quit date: 1998     Years since quittin.8    Smokeless tobacco: Never   Vaping Use    Vaping status: Never Used   Substance and Sexual Activity    Alcohol use: Yes     Alcohol/week: 8.0 standard drinks of alcohol     Types: 8 Standard drinks or equivalent per week     Comment: 1-2 per day during the week, 3-4 on weekends    Drug use: No    Sexual activity: Not on file     Remberto Hart MD   Hand, Wrist, & Elbow Surgery  [email or web address]  t: [phone]  f: [phone]

## 2025-03-13 NOTE — TELEPHONE ENCOUNTER
Dr. Hart,    ** I have updated patient's disability **     Please sign off on form if you agree to: disability due to Primary osteoarthritis of first carpometacarpal joint of left hand. Start date 02/12/25-04/30/25. Return to work 05/01/25     -Signature page will be the first page scanned  -From your Inbasket, Highlight the patient and click Chart   -Double click the 02/28/2025 Forms Completion telephone encounter  -Scroll down to the Media section   -Click the blue Hyperlink: Updated disability Dr Hart 03/13/25  -Click Acknowledge located in the top right ribbon/menu   -Drag the mouse into the blank space of the document and a + sign will appear. Left click to   electronically sign the document.  -Once signed, simply exit out of the screen and you signature will be saved.     Thank you,  Nicole

## 2025-03-18 NOTE — TELEPHONE ENCOUNTER
Called patient to inform we received Release of Information Questionnaire. Fax patient wrote is incorrect. Informed patient if he would like forms faxed to that number. Patient states that was a typo. Patient requesting a new quest be sent.

## 2025-03-18 NOTE — TELEPHONE ENCOUNTER
Patient called forms dept. Patient states forms are incorrect it should be for his R hand and not L hand. Informed patient we will revise forms and upload to [employer]. Patient requesting forms to be faxed. Informed patient we are unable to fax out, we do not have an authorization on file. Informed patient I can send [employer] message for completion of Release of Information. Patient verbalized understanding.

## 2025-03-19 ENCOUNTER — APPOINTMENT (OUTPATIENT)
Dept: PHYSICAL THERAPY | Age: 65
End: 2025-03-19
Payer: COMMERCIAL

## 2025-03-19 ENCOUNTER — APPOINTMENT (OUTPATIENT)
Dept: URBAN - METROPOLITAN AREA CLINIC 244 | Age: 65
Setting detail: DERMATOLOGY
End: 2025-03-19

## 2025-03-19 DIAGNOSIS — Z79.899 OTHER LONG TERM (CURRENT) DRUG THERAPY: ICD-10-CM

## 2025-03-19 DIAGNOSIS — L40.0 PSORIASIS VULGARIS: ICD-10-CM

## 2025-03-19 PROCEDURE — OTHER TALTZ MONITORING: OTHER

## 2025-03-19 PROCEDURE — OTHER PRESCRIPTION MEDICATION MANAGEMENT: OTHER

## 2025-03-19 PROCEDURE — OTHER ORDER TESTS: OTHER

## 2025-03-19 PROCEDURE — OTHER DIAGNOSIS COMMENT: OTHER

## 2025-03-19 PROCEDURE — OTHER COUNSELING: OTHER

## 2025-03-19 PROCEDURE — 99214 OFFICE O/P EST MOD 30 MIN: CPT

## 2025-03-19 ASSESSMENT — LOCATION DETAILED DESCRIPTION DERM
LOCATION DETAILED: LEFT KNEE
LOCATION DETAILED: LEFT LATERAL ABDOMEN
LOCATION DETAILED: PERIUMBILICAL SKIN
LOCATION DETAILED: RIGHT ELBOW
LOCATION DETAILED: RIGHT KNEE
LOCATION DETAILED: LEFT ELBOW

## 2025-03-19 ASSESSMENT — LOCATION ZONE DERM
LOCATION ZONE: ARM
LOCATION ZONE: LEG
LOCATION ZONE: TRUNK

## 2025-03-19 ASSESSMENT — BSA PSORIASIS: % BODY COVERED IN PSORIASIS: 7

## 2025-03-19 ASSESSMENT — LOCATION SIMPLE DESCRIPTION DERM
LOCATION SIMPLE: RIGHT KNEE
LOCATION SIMPLE: LEFT ELBOW
LOCATION SIMPLE: LEFT KNEE
LOCATION SIMPLE: RIGHT ELBOW
LOCATION SIMPLE: ABDOMEN

## 2025-03-19 ASSESSMENT — PGA PSORIASIS: PGA PSORIASIS 2020: MILD

## 2025-03-19 ASSESSMENT — ITCH NUMERIC RATING SCALE: HOW SEVERE IS YOUR ITCHING?: 1

## 2025-03-19 NOTE — PROCEDURE: ORDER TESTS
Performing Laboratory: -[id number]
Expected Date Of Service: 10/16/2024
Billing Type: Third-Party Bill
Bill For Surgical Tray: no

## 2025-03-19 NOTE — PROCEDURE: DIAGNOSIS COMMENT
Render Risk Assessment In Note?: no
Detail Level: Simple
Comment: TB test negative since 11/2024. Pt will repeat liver test and send it to us.

## 2025-03-19 NOTE — PROCEDURE: TALTZ MONITORING
Add High Risk Medication Management Associated Diagnosis?: No
Initial Quantiferon Gold (Optional): 11/2024
Detail Level: Zone
Length Of Therapy: 1 year

## 2025-03-19 NOTE — TELEPHONE ENCOUNTER
Disability forms completed and uploaded to patient's [employer]. Release of Information incorrect fax number.

## 2025-03-21 ENCOUNTER — MED REC SCAN ONLY (OUTPATIENT)
Dept: FAMILY MEDICINE CLINIC | Facility: CLINIC | Age: 65
End: 2025-03-21

## 2025-03-24 ENCOUNTER — OFFICE VISIT (OUTPATIENT)
Dept: PHYSICAL THERAPY | Age: 65
End: 2025-03-24
Payer: COMMERCIAL

## 2025-03-24 PROCEDURE — 97110 THERAPEUTIC EXERCISES: CPT

## 2025-03-24 PROCEDURE — 97140 MANUAL THERAPY 1/> REGIONS: CPT

## 2025-03-24 NOTE — PROGRESS NOTES
Patient: Silvio Garner (65 year old, male) Referring Provider:  Insurance:   Diagnosis: Primary osteoarthritis of first carpometacarpal joint (R) No ref. provider found  [employer]   Date of Surgery: 4/21/25 Next MD visit:  N/A   Precautions:  None 3/12/2025 Referral Information:   Date of Injury: No data recorded Date of Evaluation: Req: 0, Auth: 0, Exp:     12/24/24 POC Auth Visits:  8       Today's Date   3/24/2025    Subjective  Pt states that he saw the doctor and he got an extension for 6 more weeks from work.       Pain: 3/10     Objective  See tx log for details.           Assessment  Pt continues to have pain with certain movemens such as rotation, twisting anf pinching. Issued HEP consiting of joint proctection and do's and don'ts of thumb position during activities. Noted subluxation of CMC with grasping and pinching. Reviewed proper position of thumb during actvties. pt required moderate cues to maitain position. Issued RB strengthening to increase stabilty of thumb during activities.    Goals (to be met in 12 visits)   Increase AROM of (R) thumb pabd by 5-10 degrees to allow pt to grasp tools. MET  Increase AROM of (R) thumb MP flexion by 5-10 degrees to allow pt to use mouse.  MET  Increase AROM of (R) wrist flexion by 5-10 degrees to allow pt to open jars.  MET  Assess  and pinch when able. MET  Pt will be independent and compliant with comprehensive HEP to maintain progress achieved in OT MET           Plan  Patient will be seen for 2 x/week or a total of 12 visits over a 90 day period    Treatment Last 4 Visits        3/11/2025 3/24/2025   OT Treatment   Treatment Day 11 12   Therapeutic Exercise AROM with fluidotherapy x 10\"  Sponges with gripper at 2nd rung  Digiflex (red) digits and thumb  Objective measurements AROM with fluidotherapy x 10\"  Pt education: joint protection  Rubber band strengthening   Manual Therapy STM   STM   Therapeutic Exercise Min 35 35   Manual Therapy  Min 10 10   Total Timed Procedures 45 45   Total Service Procedures 45 45   Total Time 45 45         HEP  Joint protection  Rubber band strengthening (yellow/red)    Charges     1MT, 2TE    Brian Dorado,OTR/L

## 2025-03-25 ENCOUNTER — TELEPHONE (OUTPATIENT)
Dept: NEUROLOGY | Facility: CLINIC | Age: 65
End: 2025-03-25

## 2025-03-25 NOTE — TELEPHONE ENCOUNTER
Fax received from [employer] regarding patient Plan of Treatment. Document endorsed to nurses bin for provider review and signature.

## 2025-03-25 NOTE — TELEPHONE ENCOUNTER
Vyvgart orders signed by Dr Villafana, faxed to [facility] with fax confirmation received and sent to scanning.

## 2025-03-25 NOTE — TELEPHONE ENCOUNTER
Patient called stating he completed new Release of Information on [employer] with correct fax and is asking for revised form to be faxed to [employer]. Efaxed to [phone].

## 2025-04-02 ENCOUNTER — OFFICE VISIT (OUTPATIENT)
Dept: PHYSICAL THERAPY | Age: 65
End: 2025-04-02
Payer: COMMERCIAL

## 2025-04-02 PROCEDURE — 97110 THERAPEUTIC EXERCISES: CPT

## 2025-04-02 PROCEDURE — 97140 MANUAL THERAPY 1/> REGIONS: CPT

## 2025-04-02 NOTE — PROGRESS NOTES
Patient: Silvio Garner (65 year old, male) Referring Provider:  Insurance:   Diagnosis: Primary osteoarthritis of first carpometacarpal joint (R) No ref. provider found  [employer]   Date of Surgery: 4/21/25 Next MD visit:  N/A   Precautions:  None 3/12/2025 Referral Information:   Date of Injury: No data recorded Date of Evaluation: Req: 0, Auth: 0, Exp:     12/24/24 POC Auth Visits:  8       Today's Date   4/2/2025    Subjective  Pt states that he saw the doctor and he got an extension for 6 more weeks from work.       Pain: 3/10     Objective  See tx log for details.           Assessment  Pain with gripping also reported in thumb and wrist with activites such as using plunger. Reviewed joint protection and do's and dont's of thumb position. Provided red restiance band for pt to utilize for thumb strengthening exercises. In additon, provided wrist strengthening HEP as weakness could be attributed to wrist. Pt performed wrist PRE's in clinic without any subjective c/o. Pt noted dimpling of skin in left palm. Dimpling appeared to be early stages of Dupytren's. Advised pt to notify MD of status and to avoid excessive gripping. Pt agreed.    Goals (to be met in 12 visits)   Increase AROM of (R) thumb pabd by 5-10 degrees to allow pt to grasp tools. MET  Increase AROM of (R) thumb MP flexion by 5-10 degrees to allow pt to use mouse.  MET  Increase AROM of (R) wrist flexion by 5-10 degrees to allow pt to open jars.  MET  Assess  and pinch when able. MET  Pt will be independent and compliant with comprehensive HEP to maintain progress achieved in OT MET               Plan  Patient will be seen for 2 x/week or a total of 12 visits over a 90 day period    Treatment Last 4 Visits        3/11/2025 3/24/2025 4/2/2025   OT Treatment   Treatment Day 11 12 12   Therapeutic Exercise AROM with fluidotherapy x 10\"  Sponges with gripper at 2nd rung  Digiflex (red) digits and thumb  Objective measurements AROM  with fluidotherapy x 10\"  Pt education: joint protection  Rubber band strengthening AROM with fluidotherapy x 10\"  Power web thumb flexion/extension x 3 min  Wrist PRE's 2# (all planes) x 2\" each   Manual Therapy STM   STM STM   Therapeutic Exercise Min 35 35 35   Manual Therapy Min 10 10 10   Total Timed Procedures 45 45 45   Total Service Procedures 45 45 45   Total Time 45 45 45         HEP  Wrist PRE's  Red RB    Charges     1MT, 2 TE    Brian Dorado,OTR/L

## 2025-04-09 ENCOUNTER — OFFICE VISIT (OUTPATIENT)
Dept: PHYSICAL THERAPY | Age: 65
End: 2025-04-09
Payer: COMMERCIAL

## 2025-04-09 PROCEDURE — 97140 MANUAL THERAPY 1/> REGIONS: CPT

## 2025-04-09 PROCEDURE — 97110 THERAPEUTIC EXERCISES: CPT

## 2025-04-09 NOTE — PROGRESS NOTES
Patient: Silvio Garner (65 year old, male) Referring Provider:  Insurance:   Diagnosis: Primary osteoarthritis of first carpometacarpal joint (R) No ref. provider found  [employer]   Date of Surgery: 4/21/25 Next MD visit:  N/A   Precautions:  None 3/12/2025 Referral Information:   Date of Injury: No data recorded Date of Evaluation: Req: 0, Auth: 0, Exp:     12/24/24 POC Auth Visits:  13       Today's Date   4/9/2025    Subjective  Pt states that his hand was really sore yesterday probably due to the weather.       Pain: 3/10     Objective  See tx log for details.           Assessment  Pt continues to have increased pain with weather and at wrist. Applied kinesiotape with space correction alomg radial border of wrist. Decreased pain reported after kinesiotape application. Pt to see MD in two weeks. Pt continues to have concern about returning to work with pain levels and requirements of job. Good tolerance with wrist PRE's.    Goals (to be met in 12 visits)   Increase AROM of (R) thumb pabd by 5-10 degrees to allow pt to grasp tools. MET  Increase AROM of (R) thumb MP flexion by 5-10 degrees to allow pt to use mouse.  MET  Increase AROM of (R) wrist flexion by 5-10 degrees to allow pt to open jars.  MET  Assess  and pinch when able. MET  Pt will be independent and compliant with comprehensive HEP to maintain progress achieved in OT MET                   Plan       Treatment Last 4 Visits        3/11/2025 3/24/2025 4/2/2025 4/9/2025   OT Treatment   Treatment Day 11 12 12 13   Therapeutic Exercise AROM with fluidotherapy x 10\"  Sponges with gripper at 2nd rung  Digiflex (red) digits and thumb  Objective measurements AROM with fluidotherapy x 10\"  Pt education: joint protection  Rubber band strengthening AROM with fluidotherapy x 10\"  Power web thumb flexion/extension x 3 min  Wrist PRE's 2# (all planes) x 2\" each AROM with fluidotherapy x 10\"  Wrist PRE's 3# (all planes) x 2\" each  Gripper at  3rd rung with sponges  Digiflex (green) x 3 min  Forearm workout with 3 weights x 3 min     Neuro Re-Educ    Kinesiotape: space correction with 50% stretch along radial border of wrist and dorsal carpals.    Manual Therapy STM   STM STM STM  Edema massage   Neuro Re-Ed Min    3   Therapeutic Exercise Min 35 35 35 32   Manual Therapy Min 10 10 10 10   Total Timed Procedures 45 45 45 45   Total Service Procedures 45 45 45 45   Total Time 45 45 45 45         HEP       Charges     1 MT, 2 CHARLOTTE Dorado,OTR/L

## 2025-04-12 DIAGNOSIS — G70.00 MYASTHENIA GRAVIS (HCC): ICD-10-CM

## 2025-04-14 RX ORDER — MYCOPHENOLATE MOFETIL 500 MG/1
1000 TABLET ORAL 2 TIMES DAILY
Qty: 28 TABLET | Refills: 0 | Status: SHIPPED | OUTPATIENT
Start: 2025-04-14

## 2025-04-14 NOTE — TELEPHONE ENCOUNTER
Please call patient and inform I am signing for a week's worth since patient has not completed blood work yet, and liver enzymes were elevated. The CMP he gets down may only be routed to his GI, so 1-2 days after he gets it drawn, he should let us know so that I can look it.

## 2025-04-14 NOTE — TELEPHONE ENCOUNTER
Medication:  MYCOPHENOLATE MOFETIL 500 MG      Date of last refill: 12/26/2024 (#120/2)  [place]      Last office visit: 3/10/2025  Due back to clinic per last office note:  6 months  Date next office visit scheduled:    Future Appointments   Date Time Provider Department Center   4/16/2025 11:45 AM Brian Dorado OT [facility] [place]   4/21/2025 11:15 AM Remberto Hart MD [id number]   9/22/2025  1:15 PM Alma Villafana DO [facility] [place]           Last OV note recommendation:    MG seropositive   Proximal lower extremity fatigue less frequent with Vyvgart  Due to wanting to avoid getting IV infusions, and affecting veins, recommend switching to Vyvgart Hytrulo, 4 weeks on, and 5 weeks off  Continue Cellcept 1000mg BID  Monitor LFTs and continue to follow up with GI- ok with them to continue Cellcept  Continue mestinon 60mg QID  Check CBC/CMP q 3 months     Elevated LFTs-  Per GI, thought to be due to fatty liver, but potentially could be a component also of Cellcept  GI ok with patient continuing Cellcept  LFT's improved  ConReturn in about 6 months (around 9/10/2025). tinue monitoring

## 2025-04-15 NOTE — TELEPHONE ENCOUNTER
Patient notified that Dr Villafana sent Mycophenolate 7 day refill but he needs to get blood work done and inform her 2 days after completed so Dr Villafana can review before next prescription.Patient verbalized understanding.

## 2025-04-16 ENCOUNTER — OFFICE VISIT (OUTPATIENT)
Dept: PHYSICAL THERAPY | Age: 65
End: 2025-04-16
Payer: COMMERCIAL

## 2025-04-16 PROCEDURE — 97140 MANUAL THERAPY 1/> REGIONS: CPT

## 2025-04-16 PROCEDURE — 97110 THERAPEUTIC EXERCISES: CPT

## 2025-04-16 NOTE — PROGRESS NOTES
Patient: Silvio Garner (65 year old, male) Referring Provider:  Insurance:   Diagnosis: Primary osteoarthritis of first carpometacarpal joint (R) No ref. provider found  [employer]   Date of Surgery: 4/21/25 Next MD visit:  N/A   Precautions:  None 3/12/2025 Referral Information:   Date of Injury: No data recorded Date of Evaluation: Req: 0, Auth: 0, Exp:     12/24/24 POC Auth Visits:  13       Today's Date   4/16/2025    Subjective  Pt states that he doesn't have the strength in his hand to go back to work as a .       Pain: 2/10     Objective  See tx log for details.         Assessment  Paraffin applied today for pain mangement. Pt reported mild relief after application. Recommended pt purchase for home use for pain management.  Pt continue to decreased strength limiting his ablity to sutain  and pinch for extended period of time which would impact his ability to perform work duties as .  Good tolerance to strengthening exercises in clinic without any increase in symptoms.    Goals (to be met in 12 visits)   Increase AROM of (R) thumb pabd by 5-10 degrees to allow pt to grasp tools. MET  Increase AROM of (R) thumb MP flexion by 5-10 degrees to allow pt to use mouse.  MET  Increase AROM of (R) wrist flexion by 5-10 degrees to allow pt to open jars.  MET  Assess  and pinch when able. MET  Pt will be independent and compliant with comprehensive HEP to maintain progress achieved in OT MET    Added STG's  Increase (R)  strength by 4-5 lbs to allow pt to carry groceries.  Increase (R) lat pinch by 2-3 lbs to allow pt to open packages.  Increase (R) 3pt pinch by 2-3 lbs to allow pt to use wrench.  Increase (R) 2pt pinch by 2-3 lbs to allow pt use screwdriver.   Pt to verbalize 2/3 proper joint protection principles with 100% accuracy.          Plan   Continue OT 2-3 x week for 4-6 weeks for a total of 18 visits to allow pt to return to PLOF.     Treatment Last 4 Visits         3/24/2025 4/2/2025 4/9/2025 4/16/2025   OT Treatment   Treatment Day 12 12 13 14   Therapeutic Exercise AROM with fluidotherapy x 10\"  Pt education: joint protection  Rubber band strengthening AROM with fluidotherapy x 10\"  Power web thumb flexion/extension x 3 min  Wrist PRE's 2# (all planes) x 2\" each AROM with fluidotherapy x 10\"  Wrist PRE's 3# (all planes) x 2\" each  Gripper at 3rd rung with sponges  Digiflex (green) x 3 min  Forearm workout with 3 weights x 3 min   Gripper at 3rd rung with sponges  Digiflex (green) x 3 min  Forearm workout with 3 weights x 3 min     Neuro Re-Educ   Kinesiotape: space correction with 50% stretch along radial border of wrist and dorsal carpals.     Manual Therapy STM STM STM  Edema massage STM   Modalities    Paraffin x 5 \"   Neuro Re-Ed Min   3    Therapeutic Exercise Min 35 35 32 30   Manual Therapy Min 10 10 10 10   Other Therapy Min    5   Total Timed Procedures 45 45 45 40   Total Service Procedures 45 45 45 45   Total Time 45 45 45 45           Charges     1 MT, 2 CHARLOTTE Dorado,OTR/L

## 2025-04-18 DIAGNOSIS — G47.9 SLEEP DIFFICULTIES: ICD-10-CM

## 2025-04-18 RX ORDER — TEMAZEPAM 15 MG/1
15 CAPSULE ORAL NIGHTLY PRN
Qty: 30 CAPSULE | Refills: 1 | Status: SHIPPED | OUTPATIENT
Start: 2025-04-18

## 2025-04-18 NOTE — TELEPHONE ENCOUNTER
Did not pass protocol  Last office visit 11/5  Last refill was:   TEMAZEPAM 15 MG Oral Cap30 [id number]/24/2024--Sig - Route: TAKE 1 CAPSULE(15 MG) BY MOUTH EVERY NIGHT AS NEEDED FOR SLEEP - OralPatient not taking: Reported on 3/12/2025Sent to pharmacy as: Temazepam 15 MG Oral Capsule (Restoril)E-Prescribing Status: Receipt confirmed by pharmacy (10/24/2024  1:55 PM CDT)     Next appointment: none    Please sign pended medication if appropriate

## 2025-04-21 ENCOUNTER — TELEPHONE (OUTPATIENT)
Dept: ORTHOPEDICS CLINIC | Facility: CLINIC | Age: 65
End: 2025-04-21

## 2025-04-21 ENCOUNTER — OFFICE VISIT (OUTPATIENT)
Dept: ORTHOPEDICS CLINIC | Facility: CLINIC | Age: 65
End: 2025-04-21
Payer: COMMERCIAL

## 2025-04-21 VITALS — WEIGHT: 260 LBS | BODY MASS INDEX: 35.21 KG/M2 | HEIGHT: 72 IN

## 2025-04-21 DIAGNOSIS — M18.12 PRIMARY OSTEOARTHRITIS OF FIRST CARPOMETACARPAL JOINT OF LEFT HAND: Primary | ICD-10-CM

## 2025-04-21 RX ORDER — BETAMETHASONE SODIUM PHOSPHATE AND BETAMETHASONE ACETATE 3; 3 MG/ML; MG/ML
6 INJECTION, SUSPENSION INTRA-ARTICULAR; INTRALESIONAL; INTRAMUSCULAR; SOFT TISSUE ONCE
Status: COMPLETED | OUTPATIENT
Start: 2025-04-21 | End: 2025-04-21

## 2025-04-21 RX ADMIN — BETAMETHASONE SODIUM PHOSPHATE AND BETAMETHASONE ACETATE 6 MG: 3; 3 INJECTION, SUSPENSION INTRA-ARTICULAR; INTRALESIONAL; INTRAMUSCULAR; SOFT TISSUE at 11:45:00

## 2025-04-21 NOTE — PROGRESS NOTES
Clinic Note     Assessment/Plan:  63 year old with     Right thumb CMC arthroplasty 12/12/2024-patient is progressing nicely with consistent improvement in pinch strength from visit to visit.  Currently has 7 pounds of pinch strength compared to contralateral side which is 12 pounds.  Patient expected to make improvements in pinch strength over the next 4 to 6 weeks which should allow him to return to work potentially without restrictions.  We will reassess him in 5 to 6 weeks right before his tentative return to work date June  Left thumb CMC osteoarthritis -status post 2 injections, proceed with third corticosteroid injection today  Left ring finger trigger digit status post 2 corticosteroid injection with no recurrence.    Follow up: 6 weeks, x-rays prior    Injection:    Written consent was obtained.  The skin was prepped with alcohol.  Ethyl chloride spray was used anesthetize the superficial skin.  A 25-gauge needle was used to inject 1.5 mL mixture of 1 mL of 6 mg of betamethasone and 1 mL of 1% lidocaine into left thumb CMC joint.  Hemostasis achieved.  Band-Aid was applied.  Patient tolerated procedure without complication.    Imaging:   XR right thumb: evidence of cmc arthroplasty with slight subsidence but stable with no interval changes    Physical Exam:    Ht 6' (1.829 m)   Wt 250 lb (113.4 kg)   BMI 33.91 kg/m²     Skin: Incision healing well with no signs of infection.  Trace edema  Palpatory: Trace amount of tenderness at trapiziectomy site  Range of motion: Slightly decreased thumb opposition when compared to the contralateral side.  Thumb abduction motion symmetric to the contralateral side.  Pinch strength 7 pounds compared to contralateral side which is 12 pounds      CC: Right base of thumb pain    HPI: This 65 year old left-hand-dominant male presents with pain at the base of the thumb.  He has had pain there for a number of years.  Pain is moderate to severe.  It is worse with use of his  hand.  He works as a making .  The pain is described as sharp and constant.  He has tried wearing hand brace.  The pain does not wake him up at night.    Interval Hx (2/12/2025): Patient reports improvement in pain compared to preop.  He still has some discomfort and pain from time to time at the base of his thumb.    Interval Hx (3/12/2025): Overall pain has improved significantly.  Last week he has had some increased pain.  He feels still fairly weak and has difficulty holding even a hammer at this point    Interval Hx (4/21/2025): Patient notes steady improvement in strength and function.  Patient can perform activity daily living without too much of an issue however continues to have difficulty with more strenuous activities.      Past Medical History:    Anemia    Diabetes (HCC)    from chronic steriod use    Family history of malignant hyperthermia    COUSIN HAD HISTORY    Flatulence/gas pain/belching    Heartburn    High blood pressure    History of blood transfusion    NO REACTION    Lab test positive for detection of COVID-19 virus    5/2020, subsequent testing negative    Microcytic anemia    Myasthenia gravis (HCC)    7/2020    OTHER DISEASES    Prostate nodule    Psoriasis    Sleep apnea    CPAP     Past Surgical History:   Procedure Laterality Date    Cataract Bilateral     Colonoscopy  2009    normal with diverticuli    Colonoscopy,diagnostic  09/09/2009    Performed by DEONTE TYLER at [facility] [facility] [place], [facility]    Hand surgery Right     right thumb    Mastoidectomy,simple  11/29/2022    Other surgical history  1978    foot    Other surgical history  2009    prostate biopsy, normal    Upper gi endoscopy,biopsy  09/09/2009    duodenal ulcer     Current Outpatient Medications   Medication Sig Dispense Refill    temazepam 15 MG Oral Cap Take 1 capsule (15 mg total) by mouth nightly as needed for Sleep. 30 capsule 1    Mycophenolate Mofetil 500 MG Oral Tab TAKE 2 TABLETS BY MOUTH TWICE DAILY. 28  tablet 0    pantoprazole 40 MG Oral Tab EC TAKE 1 TABLET(40 MG) BY MOUTH TWICE DAILY BEFORE MEALS 180 tablet 0    lisinopril 40 MG Oral Tab Take 1 tablet (40 mg total) by mouth daily. 90 tablet 1    metFORMIN 500 MG Oral Tab Take 1 tablet (500 mg total) by mouth 2 (two) times daily with meals. 180 tablet 1    TALTZ 80 MG/ML Subcutaneous Solution Auto-injector       pyridostigmine 60 MG Oral Tab TAKE 1 TABLET BY MOUTH FIVE TIMES DAILY. 450 tablet 3    efgartigimod jamison-fcab (VYVGART) 400 MG/20ML Intravenous Solution injection Inject into the vein As Directed. EVERY 8 WEEK, 4 MORE SESSIONS LEFT      ferrous sulfate 325 (65 FE) MG Oral Tab EC Take 1 tablet (325 mg total) by mouth daily with breakfast.  0    Ascorbic Acid (VITAMIN C OR) Take 500 mg by mouth daily.      Acidophilus/Pectin Oral Cap Take 1 capsule by mouth daily.      omega-3 fatty acids 1000 MG Oral Cap Take 1,000 mg by mouth daily.      Turmeric 500 MG Oral Cap Take 1 capsule by mouth every evening.        VITAMIN D 400 UNIT OR CAPS 1 CAPSULE DAILY      HYDROcodone-acetaminophen (NORCO) 5-325 MG Oral Tab Take 1 tablet by mouth every 6 (six) hours as needed for Pain. (Patient not taking: Reported on 2025) 30 tablet 0     No Known Allergies  Family History   Problem Relation Age of Onset    Diabetes Father     Other (brain tumors) Father     Other (uremic poisoning) Paternal Grandfather     Other (malignant hyperthermia) Maternal Cousin Male      Social History     Occupational History    Not on file   Tobacco Use    Smoking status: Former     Current packs/day: 0.00     Average packs/day: 1 pack/day for 20.0 years (20.0 ttl pk-yrs)     Types: Cigarettes     Start date: 1978     Quit date: 1998     Years since quittin.0    Smokeless tobacco: Never   Vaping Use    Vaping status: Never Used   Substance and Sexual Activity    Alcohol use: Yes     Alcohol/week: 8.0 standard drinks of alcohol     Types: 8 Standard drinks or equivalent per  week     Comment: 1-2 per day during the week, 3-4 on weekends    Drug use: No    Sexual activity: Not on file     Remberto Hart MD   Hand, Wrist, & Elbow Surgery  [email or web address]  t: [phone]  f: [phone]

## 2025-04-23 ENCOUNTER — OFFICE VISIT (OUTPATIENT)
Dept: PHYSICAL THERAPY | Age: 65
End: 2025-04-23
Payer: COMMERCIAL

## 2025-04-23 PROCEDURE — 97035 APP MDLTY 1+ULTRASOUND EA 15: CPT

## 2025-04-23 PROCEDURE — 97110 THERAPEUTIC EXERCISES: CPT

## 2025-04-23 PROCEDURE — 97140 MANUAL THERAPY 1/> REGIONS: CPT

## 2025-04-23 NOTE — PROGRESS NOTES
Patient: Silvio Garner (65 year old, male) Referring Provider:  Insurance:   Diagnosis: Primary osteoarthritis of first carpometacarpal joint (R) No ref. provider found  [employer]   Date of Surgery: 4/21/25 Next MD visit:  N/A   Precautions:  None 3/12/2025 Referral Information:   Date of Injury: No data recorded Date of Evaluation: Req: 0, Auth: 0, Exp:     12/24/24 POC Auth Visits:  13       Today's Date   4/23/2025    Subjective  Pt states he has of pain in the hand and wrist       Pain: 4/10     Objective  See tx log for details.         Assessment  Pt reported increased pain in the beginning of session. Pt mentioned he did yard work yesterday, which may have contributed to the symptoms due to overuse. Paraffin applied for pain management. Pt responded well and reported decreased pain. Pt advised to take breaks during prolonged gripping activities.    Goals (to be met in 12 visits)   Increase (R)  strength by 4-5 lbs to allow pt to carry groceries.  Increase (R) lat pinch by 2-3 lbs to allow pt to open packages.  Increase (R) 3pt pinch by 2-3 lbs to allow pt to use wrench.  Increase (R) 2pt pinch by 2-3 lbs to allow pt use screwdriver.   Pt to verbalize 2/3 proper joint protection principles with 100% accuracy.         Plan  Patient will be seen for 2 x/week or a total of 12 visits over a 90 day period    Treatment Last 4 Visits        4/2/2025 4/9/2025 4/16/2025 4/23/2025   OT Treatment   Treatment Day 12 13 14 15   Therapeutic Exercise AROM with fluidotherapy x 10\"  Power web thumb flexion/extension x 3 min  Wrist PRE's 2# (all planes) x 2\" each AROM with fluidotherapy x 10\"  Wrist PRE's 3# (all planes) x 2\" each  Gripper at 3rd rung with sponges  Digiflex (green) x 3 min  Forearm workout with 3 weights x 3 min   Gripper at 3rd rung with sponges  Digiflex (green) x 3 min  Forearm workout with 3 weights x 3 min   AROM with fluidotherapy x 10\"  Power web wrist flexion/extension x 3 min    Neuro Re-Educ  Kinesiotape: space correction with 50% stretch along radial border of wrist and dorsal carpals.      Manual Therapy STM STM  Edema massage STM STM   Modalities   Paraffin x 5 \" U.S. 3.0 MHz, 1.0w /cm^2 x 8\" pulse   Paraffin x 5 \"   Neuro Re-Ed Min  3     Therapeutic Exercise Min 35 32 30 22   Manual Therapy Min 10 10 10 10   Ultrasound Min    8   Other Therapy Min   5 5   Total Timed Procedures 45 45 40 40   Total Service Procedures 45 45 45 45   Total Time 45 45 45 45         Charges     1MT, 1TE, 1US    Jodi Burnett, SOT  Brian Dorado, OTR/L

## 2025-04-30 ENCOUNTER — LAB ENCOUNTER (OUTPATIENT)
Dept: LAB | Age: 65
End: 2025-04-30
Attending: STUDENT IN AN ORGANIZED HEALTH CARE EDUCATION/TRAINING PROGRAM
Payer: COMMERCIAL

## 2025-04-30 ENCOUNTER — OFFICE VISIT (OUTPATIENT)
Dept: PHYSICAL THERAPY | Age: 65
End: 2025-04-30
Payer: COMMERCIAL

## 2025-04-30 ENCOUNTER — TELEPHONE (OUTPATIENT)
Dept: NEUROLOGY | Facility: CLINIC | Age: 65
End: 2025-04-30

## 2025-04-30 DIAGNOSIS — D50.9 IRON DEFICIENCY ANEMIA, UNSPECIFIED IRON DEFICIENCY ANEMIA TYPE: ICD-10-CM

## 2025-04-30 DIAGNOSIS — R79.89 ELEVATED LFTS: ICD-10-CM

## 2025-04-30 DIAGNOSIS — G70.00 MYASTHENIA GRAVIS (HCC): ICD-10-CM

## 2025-04-30 LAB
ALBUMIN SERPL-MCNC: 4.8 G/DL (ref 3.2–4.8)
ALBUMIN/GLOB SERPL: 1.9 {RATIO} (ref 1–2)
ALP LIVER SERPL-CCNC: 111 U/L (ref 45–117)
ALT SERPL-CCNC: 72 U/L (ref 10–49)
ANION GAP SERPL CALC-SCNC: 11 MMOL/L (ref 0–18)
AST SERPL-CCNC: 75 U/L (ref ?–34)
BASOPHILS # BLD AUTO: 0.12 X10(3) UL (ref 0–0.2)
BASOPHILS NFR BLD AUTO: 2 %
BILIRUB SERPL-MCNC: 1 MG/DL (ref 0.2–1.1)
BUN BLD-MCNC: 13 MG/DL (ref 9–23)
BUN/CREAT SERPL: 14.4 (ref 10–20)
CALCIUM BLD-MCNC: 9.8 MG/DL (ref 8.7–10.4)
CHLORIDE SERPL-SCNC: 101 MMOL/L (ref 98–112)
CO2 SERPL-SCNC: 26 MMOL/L (ref 21–32)
CREAT BLD-MCNC: 0.9 MG/DL (ref 0.7–1.3)
DEPRECATED HBV CORE AB SER IA-ACNC: 8 NG/ML (ref 50–336)
DEPRECATED RDW RBC AUTO: 49 FL (ref 35.1–46.3)
EGFRCR SERPLBLD CKD-EPI 2021: 94 ML/MIN/1.73M2 (ref 60–?)
EOSINOPHIL # BLD AUTO: 0.28 X10(3) UL (ref 0–0.7)
EOSINOPHIL NFR BLD AUTO: 4.6 %
ERYTHROCYTE [DISTWIDTH] IN BLOOD BY AUTOMATED COUNT: 18.5 % (ref 11–15)
FASTING STATUS PATIENT QL REPORTED: YES
GLOBULIN PLAS-MCNC: 2.5 G/DL (ref 2–3.5)
GLUCOSE BLD-MCNC: 150 MG/DL (ref 70–99)
HCT VFR BLD AUTO: 28.8 % (ref 39–53)
HGB BLD-MCNC: 7.9 G/DL (ref 13–17.5)
IMM GRANULOCYTES # BLD AUTO: 0.08 X10(3) UL (ref 0–1)
IMM GRANULOCYTES NFR BLD: 1.3 %
IRON SATN MFR SERPL: 5 % (ref 20–50)
IRON SERPL-MCNC: 20 UG/DL (ref 65–175)
LYMPHOCYTES # BLD AUTO: 1.04 X10(3) UL (ref 1–4)
LYMPHOCYTES NFR BLD AUTO: 16.9 %
MCH RBC QN AUTO: 20.3 PG (ref 26–34)
MCHC RBC AUTO-ENTMCNC: 27.4 G/DL (ref 31–37)
MCV RBC AUTO: 74 FL (ref 80–100)
MONOCYTES # BLD AUTO: 0.78 X10(3) UL (ref 0.1–1)
MONOCYTES NFR BLD AUTO: 12.7 %
NEUTROPHILS # BLD AUTO: 3.84 X10 (3) UL (ref 1.5–7.7)
NEUTROPHILS # BLD AUTO: 3.84 X10(3) UL (ref 1.5–7.7)
NEUTROPHILS NFR BLD AUTO: 62.5 %
OSMOLALITY SERPL CALC.SUM OF ELEC: 289 MOSM/KG (ref 275–295)
PLATELET # BLD AUTO: 220 10(3)UL (ref 150–450)
PLATELETS.RETICULATED NFR BLD AUTO: 9 % (ref 0–7)
POTASSIUM SERPL-SCNC: 4.2 MMOL/L (ref 3.5–5.1)
PROT SERPL-MCNC: 7.3 G/DL (ref 5.7–8.2)
RBC # BLD AUTO: 3.89 X10(6)UL (ref 3.8–5.8)
SODIUM SERPL-SCNC: 138 MMOL/L (ref 136–145)
TOTAL IRON BINDING CAPACITY: 441 UG/DL (ref 250–425)
TRANSFERRIN SERPL-MCNC: 370 MG/DL (ref 215–365)
WBC # BLD AUTO: 6.1 X10(3) UL (ref 4–11)

## 2025-04-30 PROCEDURE — 83540 ASSAY OF IRON: CPT

## 2025-04-30 PROCEDURE — 82728 ASSAY OF FERRITIN: CPT

## 2025-04-30 PROCEDURE — 97140 MANUAL THERAPY 1/> REGIONS: CPT

## 2025-04-30 PROCEDURE — 84466 ASSAY OF TRANSFERRIN: CPT

## 2025-04-30 PROCEDURE — 36415 COLL VENOUS BLD VENIPUNCTURE: CPT

## 2025-04-30 PROCEDURE — 80053 COMPREHEN METABOLIC PANEL: CPT

## 2025-04-30 PROCEDURE — 97110 THERAPEUTIC EXERCISES: CPT

## 2025-04-30 PROCEDURE — 85025 COMPLETE CBC W/AUTO DIFF WBC: CPT

## 2025-04-30 NOTE — PROGRESS NOTES
Patient: Silvio Garner (65 year old, male) Referring Provider:  Insurance:   Diagnosis: Primary osteoarthritis of first carpometacarpal joint (R) No ref. provider found  [employer]   Date of Surgery: 4/21/25 Next MD visit:  N/A   Precautions:  None 3/12/2025 Referral Information:   Date of Injury: No data recorded Date of Evaluation: Req: 0, Auth: 0, Exp:     12/24/24 POC Auth Visits:  13       Today's Date   4/30/2025    Subjective  Pt states his pain is consistent since the yard work       Pain: 3/10     Objective  See tx log for details.      Assessment  Pt continues to have residual pain in the thenar and dorsal aspect of hand and thumb.Symptoms most likely due to a strain from overuse when he was doing yard work a couple days ago. Discussed with pt the importance of modifying grasping position. Issued pt thumb CMC strengthening excercis to improve stability and strength. Pt is to bring thumb splint to recreate grasping scenerio. Pt agreed.    Goals (to be met in 12 visits)   Increase (R)  strength by 4-5 lbs to allow pt to carry groceries.  Increase (R) lat pinch by 2-3 lbs to allow pt to open packages.  Increase (R) 3pt pinch by 2-3 lbs to allow pt to use wrench.  Increase (R) 2pt pinch by 2-3 lbs to allow pt use screwdriver.   Pt to verbalize 2/3 proper joint protection principles with 100% accuracy.             Plan  Patient will be seen for 2 x/week or a total of 12 visits over a 90 day period    Treatment Last 4 Visits        4/9/2025 4/16/2025 4/23/2025 4/30/2025   OT Treatment   Treatment Day 13 14 15 16   Therapeutic Exercise AROM with fluidotherapy x 10\"  Wrist PRE's 3# (all planes) x 2\" each  Gripper at 3rd rung with sponges  Digiflex (green) x 3 min  Forearm workout with 3 weights x 3 min   Gripper at 3rd rung with sponges  Digiflex (green) x 3 min  Forearm workout with 3 weights x 3 min   AROM with fluidotherapy x 10\"  Power web wrist flexion/extension x 3 min AROM with  fluidotherapy x 10\"  Power web wrist flexion/extension x 3 min  Gripper at 1st rung with sponges   Neuro Re-Educ Kinesiotape: space correction with 50% stretch along radial border of wrist and dorsal carpals.       Manual Therapy STM  Edema massage STM STM    Modalities  Paraffin x 5 \" U.S. 3.0 MHz, 1.0w /cm^2 x 8\" pulse   Paraffin x 5 \"    Neuro Re-Ed Min 3      Therapeutic Exercise Min 32 30 22 35   Manual Therapy Min 10 10 10 10   Ultrasound Min   8    Other Therapy Min  5 5    Total Timed Procedures 45 40 40 45   Total Service Procedures 45 45 45 45   Total Time 45 45 45 45         Charges     1MT, 2TE     Brian Dorado OTR/MANOJ Burnett SOT

## 2025-05-01 ENCOUNTER — APPOINTMENT (OUTPATIENT)
Dept: PHYSICAL THERAPY | Age: 65
End: 2025-05-01
Payer: COMMERCIAL

## 2025-05-01 ENCOUNTER — TELEPHONE (OUTPATIENT)
Dept: FAMILY MEDICINE CLINIC | Facility: CLINIC | Age: 65
End: 2025-05-01

## 2025-05-01 NOTE — TELEPHONE ENCOUNTER
Patient had the blood work ordered by GI-  hemoglobin is 7.9 please call him and ask if GI gave him recommendations regarding his anemia?  Please ask how patient is doing.  Thank you

## 2025-05-01 NOTE — TELEPHONE ENCOUNTER
I would like patient to call GI doctor.  He has a history of GI bleeding previously may need to be evaluated for that.  Continue iron supplement with vitamin C.  If patient would notice chest pain ,shortness of breath ,heart palpitations, feeling passing out or active GI bleeding - proceed to ER.  Okay to use [facility] in May  for his med check.

## 2025-05-01 NOTE — TELEPHONE ENCOUNTER
Patient informed of Dr. Gibson's recommendations below. He verbalized understanding and agreed with plan. Patient scheduled for med check on 5/19.

## 2025-05-01 NOTE — TELEPHONE ENCOUNTER
Patient informed of Dr. Gibson's note below. States that he is still waiting for a call from his GI. He's been taking iron 325mg. States that he's a little tired but no other symptoms reported. Feels fine. Patient wants to know what's causing the anemia and if Dr. Gibson can manage it as well. Thank you.

## 2025-05-02 NOTE — TELEPHONE ENCOUNTER
Patient called back to give update in condition. He already spoke with GI and will be scheduled for 3 doses of IV venofer and CT ordered as well.

## 2025-05-07 ENCOUNTER — OFFICE VISIT (OUTPATIENT)
Dept: PHYSICAL THERAPY | Age: 65
End: 2025-05-07
Payer: COMMERCIAL

## 2025-05-07 PROCEDURE — 97140 MANUAL THERAPY 1/> REGIONS: CPT

## 2025-05-07 PROCEDURE — 97110 THERAPEUTIC EXERCISES: CPT

## 2025-05-07 NOTE — PROGRESS NOTES
Patient: Silvio Garner (65 year old, male) Referring Provider:  Insurance:   Diagnosis: Primary osteoarthritis of first carpometacarpal joint (R) No ref. provider found  [employer]   Date of Surgery: 4/21/25 Next MD visit:  N/A   Precautions:  None 3/12/2025 Referral Information:   Date of Injury: No data recorded Date of Evaluation: Req: 0, Auth: 0, Exp:     12/24/24 POC Auth Visits:  13       Today's Date   5/7/2025    Subjective  Pt states he continue to have pain due to yard work       Pain: 3/10     Objective  See tx log for details.      Assessment  Pt has been performing light yard work such as trimming grass. However, continues to have pain in the thenar and dorsal aspect of wrist and thumb.  Pt performed activities during session with thumb splint with minimal pain. Pt advised to wear thumb splint when completing functional activities at home. Discussed with pt making changes with lifestyle such as diet and changing ways to perform functional activities to prevent further injury. Issued pt joint protection techniques. Pt verbalized good understanding. Suggested metagrip splint for additional support if current splint is not satisfactory.    Goals (to be met in 12 visits)   Increase (R)  strength by 4-5 lbs to allow pt to carry groceries.  Increase (R) lat pinch by 2-3 lbs to allow pt to open packages.  Increase (R) 3pt pinch by 2-3 lbs to allow pt to use wrench.  Increase (R) 2pt pinch by 2-3 lbs to allow pt use screwdriver.   Pt to verbalize 2/3 proper joint protection principles with 100% accuracy.               Plan  Patient will be seen for 2 x/week or a total of 12 visits over a 90 day period    Treatment Last 4 Visits        4/16/2025 4/23/2025 4/30/2025 5/7/2025   OT Treatment   Treatment Day 14 15 16 17   Therapeutic Exercise Gripper at 3rd rung with sponges  Digiflex (green) x 3 min  Forearm workout with 3 weights x 3 min   AROM with fluidotherapy x 10\"  Power web wrist  flexion/extension x 3 min AROM with fluidotherapy x 10\"  Power web wrist flexion/extension x 3 min  Gripper at 1st rung with sponges Velcro board (2pt pinch/lat pinch) 2 min each   Manual Therapy STM STM  STM   Modalities Paraffin x 5 \" U.S. 3.0 MHz, 1.0w /cm^2 x 8\" pulse   Paraffin x 5 \"  U.S. 3.0 MHz, 1.0w /cm^2 x 8\" pulse   Paraffin x 5 \"     Therapeutic Exercise Min 30 22 35 20   Manual Therapy Min 10 10 10 10   Ultrasound Min  8  8   Other Therapy Min 5 5     Total Timed Procedures 40 40 45 38   Total Service Procedures 45 45 45 38   Total Time 45 45 45 38         HEP  Joint protection techniques    Charges     1MT, 2TE    Jodi Burnett, SOT  Brian Dorado, OTR/L

## 2025-05-12 ENCOUNTER — TELEPHONE (OUTPATIENT)
Dept: NEUROLOGY | Facility: CLINIC | Age: 65
End: 2025-05-12

## 2025-05-13 DIAGNOSIS — E11.65 TYPE 2 DIABETES MELLITUS WITH HYPERGLYCEMIA, WITHOUT LONG-TERM CURRENT USE OF INSULIN (HCC): Primary | ICD-10-CM

## 2025-05-14 NOTE — TELEPHONE ENCOUNTER
LOV:  11/5/2024 for: Medication Follow-Up   Patient advised to RTC on:  3 months.   Nov:05/19/2025      Medication Quantity Refills Start End   metFORMIN 500 MG Oral Tab 180 tablet 1 11/5/2024 --   Sig:   Take 1 tablet (500 mg total) by mouth 2 (two) times daily with meals.

## 2025-05-16 ENCOUNTER — HOSPITAL ENCOUNTER (OUTPATIENT)
Dept: CT IMAGING | Age: 65
Discharge: HOME | End: 2025-05-16
Attending: STUDENT IN AN ORGANIZED HEALTH CARE EDUCATION/TRAINING PROGRAM
Payer: COMMERCIAL

## 2025-05-16 DIAGNOSIS — D50.9 IRON DEFICIENCY ANEMIA, UNSPECIFIED IRON DEFICIENCY ANEMIA TYPE: ICD-10-CM

## 2025-05-16 PROCEDURE — 74177 CT ABD & PELVIS W/CONTRAST: CPT | Performed by: STUDENT IN AN ORGANIZED HEALTH CARE EDUCATION/TRAINING PROGRAM

## 2025-05-19 ENCOUNTER — OFFICE VISIT (OUTPATIENT)
Dept: FAMILY MEDICINE CLINIC | Facility: CLINIC | Age: 65
End: 2025-05-19
Payer: COMMERCIAL

## 2025-05-19 VITALS
DIASTOLIC BLOOD PRESSURE: 60 MMHG | BODY MASS INDEX: 35.35 KG/M2 | SYSTOLIC BLOOD PRESSURE: 124 MMHG | HEIGHT: 72 IN | RESPIRATION RATE: 18 BRPM | WEIGHT: 261 LBS | OXYGEN SATURATION: 98 % | HEART RATE: 72 BPM | TEMPERATURE: 97 F

## 2025-05-19 DIAGNOSIS — M18.12 PRIMARY OSTEOARTHRITIS OF FIRST CARPOMETACARPAL JOINT OF LEFT HAND: ICD-10-CM

## 2025-05-19 DIAGNOSIS — D50.9 IRON DEFICIENCY ANEMIA, UNSPECIFIED IRON DEFICIENCY ANEMIA TYPE: ICD-10-CM

## 2025-05-19 DIAGNOSIS — G70.00 MYASTHENIA GRAVIS (HCC): ICD-10-CM

## 2025-05-19 DIAGNOSIS — Z12.5 SCREENING FOR PROSTATE CANCER: ICD-10-CM

## 2025-05-19 DIAGNOSIS — L40.9 PSORIASIS: ICD-10-CM

## 2025-05-19 DIAGNOSIS — G47.9 SLEEP DIFFICULTIES: ICD-10-CM

## 2025-05-19 DIAGNOSIS — Z23 NEED FOR HEPATITIS B VACCINATION: ICD-10-CM

## 2025-05-19 DIAGNOSIS — E78.00 HYPERCHOLESTEROLEMIA: ICD-10-CM

## 2025-05-19 DIAGNOSIS — K22.70 BARRETT'S ESOPHAGUS WITHOUT DYSPLASIA: ICD-10-CM

## 2025-05-19 DIAGNOSIS — I10 ESSENTIAL HYPERTENSION: ICD-10-CM

## 2025-05-19 DIAGNOSIS — E11.65 TYPE 2 DIABETES MELLITUS WITH HYPERGLYCEMIA, WITHOUT LONG-TERM CURRENT USE OF INSULIN (HCC): Primary | ICD-10-CM

## 2025-05-19 PROCEDURE — 90746 HEPB VACCINE 3 DOSE ADULT IM: CPT | Performed by: FAMILY MEDICINE

## 2025-05-19 PROCEDURE — 90471 IMMUNIZATION ADMIN: CPT | Performed by: FAMILY MEDICINE

## 2025-05-19 PROCEDURE — 99214 OFFICE O/P EST MOD 30 MIN: CPT | Performed by: FAMILY MEDICINE

## 2025-05-19 RX ORDER — LISINOPRIL 40 MG/1
40 TABLET ORAL DAILY
Qty: 90 TABLET | Refills: 1 | Status: SHIPPED | OUTPATIENT
Start: 2025-05-19

## 2025-05-19 RX ORDER — TEMAZEPAM 15 MG/1
15 CAPSULE ORAL NIGHTLY PRN
Qty: 30 CAPSULE | Refills: 1 | Status: SHIPPED | OUTPATIENT
Start: 2025-05-19

## 2025-05-19 RX ORDER — PANTOPRAZOLE SODIUM 40 MG/1
40 TABLET, DELAYED RELEASE ORAL
Qty: 180 TABLET | Refills: 1 | Status: SHIPPED | OUTPATIENT
Start: 2025-05-19

## 2025-05-19 NOTE — PROGRESS NOTES
Silvio Garner is a 65 year old male.  HPI:       The following individual(s) verbally consented to be recorded using ambient AI listening technology and understand that they can each withdraw their consent to this listening technology at any point by asking the clinician to turn off or pause the recording:    Patient name: Silvio Garner    History of Present Illness  Silvio Garner is a 65 year old male who presents for follow-up multiple problems,  medication check, follow-up on anemia.     They are currently on metformin for diabetes management. Blood sugar levels have been slightly elevated, around 140 mg/dL, with fluctuations postprandially. They have not increased their metformin dosage.  Denies any numbness or tingling of the extremities for examination done today.  Needs diabetic eye examination.    They are taking pantoprazole for acid reflux, reduced to once daily after a consultation with their gastroenterologist. A colonoscopy in December revealed polyps, gastritis, a hiatal hernia, and Sesay's esophagus without dysplasia.    They have anemia, with hemoglobin decreasing from 13.2 g/dL in August to 7.9 g/dL recently. They are scheduled for iron infusions and are taking an oral iron supplement once daily without gastrointestinal side effects. They feel tired due to low hemoglobin but do not experience significant discomfort or pain. A recently CT enterography was performed to investigate potential sources of bleeding in the small intestine, with results pending.    They are on lisinopril for blood pressure management, with recent readings around 124/60 mmHg. They use temazepam for sleep on an as-needed basis, preferring not to take it frequently due to its addictive potential.    Their sister has stage four pancreatic cancer with liver metastasis, currently undergoing treatment.    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 They are  considering senior living due to ongoing health issues and are currently on disability leave, contemplating whether to return to work or retire fully.    No significant discomfort or pain, no shortness of breath with stairs, but mild shortness of breath when mowing the lawn.    Myasthenia gravis patient is seeing specialist doing well.  Continue present management.  Seeing neurologist.     Current Medications[1]   Past Medical History[2]   Social History:  Short Social Hx on File[3]     REVIEW OF SYSTEMS:   GENERAL HEALTH: feels well otherwise  SKIN: denies any unusual skin lesions or rashes  HEENT no congestion no runny nose no sore throat  Neck no neck pain   RESPIRATORY: denies shortness of breath with exertion  CARDIOVASCULAR: denies chest pain on exertion  GI: denies abdominal pain and denies heartburn  NEURO: denies headaches  Psych normal mood    EXAM:   /60   Pulse 72   Temp 97.3 °F (36.3 °C) (Temporal)   Resp 18   Ht 6' (1.829 m)   Wt 261 lb (118.4 kg)   SpO2 98%   BMI 35.40 kg/m²   GENERAL: well developed, well nourished,in no apparent distress  SKIN: no rashes,no suspicious lesions  HEENT: atraumatic, normocephalic,ears and throat are clear  NECK: supple,no adenopathy  LUNGS: clear to auscultation  CARDIO: RRR without murmur  GI: good BS's,no masses, HSM or tenderness  EXTREMITIES: no cyanosis, clubbing or edema  Bilateral barefoot skin diabetic exam is normal, visualized feet with osteoarthritis change of the feet ,hammertoes  Bilateral monofilament/sensation of both feet is normal.  Pulsation pedal pulse exam of both lower legs/feet is normal as well.       Results for orders placed or performed in visit on 04/30/25   Comp Metabolic Panel (14)    Collection Time: 04/30/25 11:17 AM   Result Value Ref Range    Glucose 150 (H) 70 - 99 mg/dL    Sodium 138 136 - 145 mmol/L    Potassium 4.2 3.5 - 5.1 mmol/L    Chloride 101 98 - 112 mmol/L    CO2 26.0 21.0 - 32.0 mmol/L    Anion Gap 11 0 - 18 mmol/L     BUN 13 9 - 23 mg/dL    Creatinine 0.90 0.70 - 1.30 mg/dL    BUN/CREA Ratio 14.4 10.0 - 20.0    Calcium, Total 9.8 8.7 - 10.4 mg/dL    Calculated Osmolality 289 275 - 295 mOsm/kg    eGFR-Cr 94 >=60 mL/min/1.73m2    ALT 72 (H) 10 - 49 U/L    AST 75 (H) <34 U/L    Alkaline Phosphatase 111 45 - 117 U/L    Bilirubin, Total 1.0 0.2 - 1.1 mg/dL    Total Protein 7.3 5.7 - 8.2 g/dL    Albumin 4.8 3.2 - 4.8 g/dL    Globulin  2.5 2.0 - 3.5 g/dL    A/G Ratio 1.9 1.0 - 2.0    Patient Fasting for CMP? Yes    CBC With Diff (6399)    Collection Time: 04/30/25 11:17 AM   Result Value Ref Range    WBC 6.1 4.0 - 11.0 x10(3) uL    RBC 3.89 3.80 - 5.80 x10(6)uL    HGB 7.9 (L) 13.0 - 17.5 g/dL    HCT 28.8 (L) 39.0 - 53.0 %    MCV 74.0 (L) 80.0 - 100.0 fL    MCH 20.3 (L) 26.0 - 34.0 pg    MCHC 27.4 (L) 31.0 - 37.0 g/dL    RDW-SD 49.0 (H) 35.1 - 46.3 fL    RDW 18.5 (H) 11.0 - 15.0 %    .0 150.0 - 450.0 10(3)uL    Immature Platelet Fraction 9.0 (H) 0.0 - 7.0 %    Neutrophil Absolute Prelim 3.84 1.50 - 7.70 x10 (3) uL    Neutrophil Absolute 3.84 1.50 - 7.70 x10(3) uL    Lymphocyte Absolute 1.04 1.00 - 4.00 x10(3) uL    Monocyte Absolute 0.78 0.10 - 1.00 x10(3) uL    Eosinophil Absolute 0.28 0.00 - 0.70 x10(3) uL    Basophil Absolute 0.12 0.00 - 0.20 x10(3) uL    Immature Granulocyte Absolute 0.08 0.00 - 1.00 x10(3) uL    Neutrophil % 62.5 %    Lymphocyte % 16.9 %    Monocyte % 12.7 %    Eosinophil % 4.6 %    Basophil % 2.0 %    Immature Granulocyte % 1.3 %   Iron And Tibc (7573)    Collection Time: 04/30/25 11:17 AM   Result Value Ref Range    Iron 20 (L) 65 - 175 ug/dL    Transferrin 370 (H) 215 - 365 mg/dL    Total Iron Binding Capacity 441 (H) 250 - 425 ug/dL    % Saturation 5 (L) 20 - 50 %   Ferritin (457)    Collection Time: 04/30/25 11:17 AM   Result Value Ref Range    Ferritin 8 (L) 50 - 336 ng/mL      ASSESSMENT AND PLAN:     Encounter Diagnoses   Name Primary?    Type 2 diabetes mellitus with hyperglycemia, without  long-term current use of insulin (HCC) Yes    Hypercholesterolemia     Iron deficiency anemia, unspecified iron deficiency anemia type     Sleep difficulties     Sesay's esophagus without dysplasia     Essential hypertension     Screening for prostate cancer     Need for hepatitis B vaccination     Myasthenia gravis (HCC)     Primary osteoarthritis of first carpometacarpal joint of left hand     Psoriasis      Assessment & Plan  Iron deficiency anemia  Iron deficiency anemia likely secondary to chronic gastritis with bleeding. Hemoglobin decreased from 13.2 g/dL to 7.9 g/dL.  - Administer iron infusions starting tomorrow.  - Monitor hemoglobin levels post-infusion.  - Follow up with GI for further management.    Gastritis with bleeding  Chronic gastritis with slow bleeding contributing to anemia. CT enterography performed to evaluate bleeding sources.  - Review CT enterography results when available.  - Consider medication adjustments if symptoms persist.    Type 2 diabetes mellitus  Type 2 diabetes mellitus managed with metformin. Blood sugar levels around 140 mg/dL. No recent diabetic eye exam.  - Continue metformin as prescribed.  - Schedule diabetic eye examination.  - Monitor blood sugar levels and dietary intake.    Hypertension  Hypertension managed with lisinopril. Blood pressure 124/60 mmHg.  - Continue lisinopril as prescribed.    Insomnia  Intermittent insomnia managed with temazepam. Aware of addictive potential.  - Continue temazepam as needed.  - Encourage non-pharmacological sleep hygiene practices.    Sesay's esophagus without dysplasia  Sesay's esophagus without dysplasia noted during endoscopy.    Hiatal hernia  Hiatal hernia identified during endoscopy.    Gastric nodule  Gastric nodule identified during endoscopy.     Myasthenia gravis  -Management per neurology stable.    Osteoarthritis of the first carpometacarpal joint  - s/p surgery still recovering back at work yet.    Orders Placed  This Encounter   Procedures    Comp Metabolic Panel (14)    Hemoglobin A1C    Microalb/Creat Ratio, Random Urine    Lipid Panel    Ferritin    Iron And Tibc    TSH W Reflex To Free T4 [E]    PSA, Total (Screening) [E]    UA/M With Culture Reflex [E]    CBC W Differential W Platelet [E]    Hepatitis B Surface Antibody [E]    Hep B, Adult [20442]       Meds & Refills for this Visit:  Requested Prescriptions     Signed Prescriptions Disp Refills    metFORMIN 500 MG Oral Tab 180 tablet 1     Sig: Take 1 tablet (500 mg total) by mouth 2 (two) times daily with meals.    temazepam 15 MG Oral Cap 30 capsule 1     Sig: Take 1 capsule (15 mg total) by mouth nightly as needed for Sleep.    pantoprazole 40 MG Oral Tab  tablet 1     Sig: Take 1 tablet (40 mg total) by mouth 2 (two) times daily before meals.    lisinopril 40 MG Oral Tab 90 tablet 1     Sig: Take 1 tablet (40 mg total) by mouth daily.     Continue current meds.   Watch diet for fats and carbs.   Stay active.    Do labs prior visit in August 2025.   Schedule complete physical for August 2025.  Complete testing per GI as recommended.  Do  iron infusions.    VISIT SUMMARY:  Today, you came in for a medication check. We reviewed your current medications and discussed your recent health concerns, including your diabetes, anemia, acid reflux, and blood pressure. We also talked about your recent colonoscopy findings and your sister's health condition. You are considering assisted due to ongoing health issues.    YOUR PLAN:  -IRON DEFICIENCY ANEMIA: Iron deficiency anemia means you have a lower than normal number of red blood cells due to a lack of iron. This is likely due to chronic gastritis with bleeding. We will start iron infusions tomorrow and monitor your hemoglobin levels after the infusions. Please follow up with your gastroenterologist for further management.    -GASTRITIS WITH BLEEDING: Gastritis with bleeding means that the lining of your stomach is  inflamed and bleeding slowly, which can contribute to anemia.you had  performed a CT enterography to evaluate the sources of bleeding .  Follow-up on the test your gastroenterologist.      -TYPE 2 DIABETES MELLITUS: Type 2 diabetes mellitus is a condition where your body does not use insulin properly, leading to high blood sugar levels. Your blood sugar levels are around 140 mg/dL. Continue taking metformin as prescribed, schedule a diabetic eye examination, and monitor your blood sugar levels and dietary intake.    -HYPERTENSION: Hypertension means high blood pressure. Your blood pressure is currently well-managed at 124/60 mmHg with lisinopril. Continue taking lisinopril as prescribed.    -INSOMNIA: Insomnia is difficulty falling or staying asleep. You are managing it with temazepam as needed and are aware of its addictive potential. Continue using temazepam as needed and practice good sleep hygiene.    -HALEY'S ESOPHAGUS WITHOUT DYSPLASIA: Haley's esophagus is a condition where the lining of the esophagus changes due to acid reflux. There is no dysplasia, meaning no precancerous changes were found.    -HIATAL HERNIA: A hiatal hernia occurs when part of the stomach pushes up through the diaphragm. This was identified during your endoscopy.    -GASTRIC NODULE: A gastric nodule is a small growth in the stomach. This was identified during your endoscopy.    INSTRUCTIONS:  Please start your iron infusions tomorrow and monitor your hemoglobin levels after the infusions. Schedule a diabetic eye examination and continue monitoring your blood sugar levels and dietary intake. Follow up with your gastroenterologist for further management of your gastritis and review the CT enterography results when available.    Contains text generated by Arara     Imaging & Consults:  HEPATITIS B VACCINE, OVER 20   The patient indicates understanding of these issues and agrees to the plan.  The patient is asked to return in 3 months.           [1]   Current Outpatient Medications   Medication Sig Dispense Refill    metFORMIN 500 MG Oral Tab Take 1 tablet (500 mg total) by mouth 2 (two) times daily with meals. 180 tablet 1    temazepam 15 MG Oral Cap Take 1 capsule (15 mg total) by mouth nightly as needed for Sleep. 30 capsule 1    pantoprazole 40 MG Oral Tab EC Take 1 tablet (40 mg total) by mouth 2 (two) times daily before meals. 180 tablet 1    lisinopril 40 MG Oral Tab Take 1 tablet (40 mg total) by mouth daily. 90 tablet 1    Mycophenolate Mofetil 500 MG Oral Tab TAKE 2 TABLETS BY MOUTH TWICE DAILY. 28 tablet 0    TALTZ 80 MG/ML Subcutaneous Solution Auto-injector       pyridostigmine 60 MG Oral Tab TAKE 1 TABLET BY MOUTH FIVE TIMES DAILY. 450 tablet 3    efgartigimod jamison-fcab (VYVGART) 400 MG/20ML Intravenous Solution injection Inject into the vein As Directed. EVERY 8 WEEK, 4 MORE SESSIONS LEFT      ferrous sulfate 325 (65 FE) MG Oral Tab EC Take 1 tablet (325 mg total) by mouth daily with breakfast.  0    Ascorbic Acid (VITAMIN C OR) Take 500 mg by mouth daily.      Acidophilus/Pectin Oral Cap Take 1 capsule by mouth daily.      omega-3 fatty acids 1000 MG Oral Cap Take 1,000 mg by mouth daily.      Turmeric 500 MG Oral Cap Take 1 capsule by mouth every evening.        VITAMIN D 400 UNIT OR CAPS 1 CAPSULE DAILY     [2]   Past Medical History:   Anemia    Diabetes (HCC)    from chronic steriod use    Family history of malignant hyperthermia    COUSIN HAD HISTORY    Flatulence/gas pain/belching    Heartburn    High blood pressure    History of blood transfusion    NO REACTION    Lab test positive for detection of COVID-19 virus    5/2020, subsequent testing negative    Microcytic anemia    Myasthenia gravis (HCC)    7/2020    OTHER DISEASES    Prostate nodule    Psoriasis    Sleep apnea    CPAP   [3]   Social History  Socioeconomic History    Marital status:    Tobacco Use    Smoking status: Former     Current packs/day: 0.00      Average packs/day: 1 pack/day for 20.0 years (20.0 ttl pk-yrs)     Types: Cigarettes     Start date: 1978     Quit date: 1998     Years since quittin.0    Smokeless tobacco: Never   Vaping Use    Vaping status: Never Used   Substance and Sexual Activity    Alcohol use: Yes     Alcohol/week: 8.0 standard drinks of alcohol     Types: 8 Standard drinks or equivalent per week     Comment: 1-2 per day during the week, 3-4 on weekends    Drug use: No   Other Topics Concern    Caffeine Concern Yes     Comment: Occ    Exercise No     Comment: not really     Social Drivers of Health     Food Insecurity: No Food Insecurity (3/3/2025)    Received from [facility]    Hunger Vital Sign     Worried About Running Out of Food in the Last Year: Never true     Ran Out of Food in the Last Year: Never true   Transportation Needs: Unknown (3/3/2025)    Received from [facility]    PRAPARE - Transportation     Lack of Transportation (Medical): Patient declined     Lack of Transportation (Non-Medical): No   Housing Stability: Low Risk  (2024)    Received from [facility]    Housing Stability Vital Sign     Unable to Pay for Housing in the Last Year: No     Number of Places Lived in the Last Year: 1     Unstable Housing in the Last Year: No

## 2025-05-19 NOTE — PATIENT INSTRUCTIONS
Continue current meds.   Watch diet for fats and carbs.   Stay active.    Do labs prior visit in August 2025.   Schedule complete physical for August 2025.  Complete testing per GI as recommended.  Do  iron infusions.    VISIT SUMMARY:  Today, you came in for a medication check. We reviewed your current medications and discussed your recent health concerns, including your diabetes, anemia, acid reflux, and blood pressure. We also talked about your recent colonoscopy findings and your sister's health condition. You are considering penitentiary due to ongoing health issues.    YOUR PLAN:  -IRON DEFICIENCY ANEMIA: Iron deficiency anemia means you have a lower than normal number of red blood cells due to a lack of iron. This is likely due to chronic gastritis with bleeding. We will start iron infusions tomorrow and monitor your hemoglobin levels after the infusions. Please follow up with your gastroenterologist for further management.    -GASTRITIS WITH BLEEDING: Gastritis with bleeding means that the lining of your stomach is inflamed and bleeding slowly, which can contribute to anemia.you had  performed a CT enterography to evaluate the sources of bleeding .  Follow-up on the test your gastroenterologist.      -TYPE 2 DIABETES MELLITUS: Type 2 diabetes mellitus is a condition where your body does not use insulin properly, leading to high blood sugar levels. Your blood sugar levels are around 140 mg/dL. Continue taking metformin as prescribed, schedule a diabetic eye examination, and monitor your blood sugar levels and dietary intake.    -HYPERTENSION: Hypertension means high blood pressure. Your blood pressure is currently well-managed at 124/60 mmHg with lisinopril. Continue taking lisinopril as prescribed.    -INSOMNIA: Insomnia is difficulty falling or staying asleep. You are managing it with temazepam as needed and are aware of its addictive potential. Continue using temazepam as needed and practice good sleep  hygiene.    -HALEY'S ESOPHAGUS WITHOUT DYSPLASIA: Haley's esophagus is a condition where the lining of the esophagus changes due to acid reflux. There is no dysplasia, meaning no precancerous changes were found.    -HIATAL HERNIA: A hiatal hernia occurs when part of the stomach pushes up through the diaphragm. This was identified during your endoscopy.    -GASTRIC NODULE: A gastric nodule is a small growth in the stomach. This was identified during your endoscopy.    INSTRUCTIONS:  Please start your iron infusions tomorrow and monitor your hemoglobin levels after the infusions. Schedule a diabetic eye examination and continue monitoring your blood sugar levels and dietary intake. Follow up with your gastroenterologist for further management of your gastritis and review the CT enterography results when available.    Contains text generated by Nany

## 2025-05-27 ENCOUNTER — TELEPHONE (OUTPATIENT)
Dept: ORTHOPEDICS CLINIC | Facility: CLINIC | Age: 65
End: 2025-05-27

## 2025-05-27 DIAGNOSIS — M18.11 ARTHRITIS OF CARPOMETACARPAL (CMC) JOINT OF RIGHT THUMB: Primary | ICD-10-CM

## 2025-05-28 ENCOUNTER — OFFICE VISIT (OUTPATIENT)
Dept: ORTHOPEDICS CLINIC | Facility: CLINIC | Age: 65
End: 2025-05-28
Payer: COMMERCIAL

## 2025-05-28 ENCOUNTER — HOSPITAL ENCOUNTER (OUTPATIENT)
Dept: GENERAL RADIOLOGY | Age: 65
Discharge: HOME | End: 2025-05-28
Attending: ORTHOPAEDIC SURGERY
Payer: COMMERCIAL

## 2025-05-28 VITALS — BODY MASS INDEX: 35.35 KG/M2 | WEIGHT: 261 LBS | HEIGHT: 72 IN

## 2025-05-28 DIAGNOSIS — M65.4 TENOSYNOVITIS OF RADIAL STYLOID: Primary | ICD-10-CM

## 2025-05-28 DIAGNOSIS — M18.11 ARTHRITIS OF CARPOMETACARPAL (CMC) JOINT OF RIGHT THUMB: ICD-10-CM

## 2025-05-28 PROCEDURE — 20550 NJX 1 TENDON SHEATH/LIGAMENT: CPT | Performed by: ORTHOPAEDIC SURGERY

## 2025-05-28 PROCEDURE — 99213 OFFICE O/P EST LOW 20 MIN: CPT | Performed by: ORTHOPAEDIC SURGERY

## 2025-05-28 PROCEDURE — 73130 X-RAY EXAM OF HAND: CPT | Performed by: ORTHOPAEDIC SURGERY

## 2025-05-28 RX ORDER — BETAMETHASONE SODIUM PHOSPHATE AND BETAMETHASONE ACETATE 3; 3 MG/ML; MG/ML
6 INJECTION, SUSPENSION INTRA-ARTICULAR; INTRALESIONAL; INTRAMUSCULAR; SOFT TISSUE ONCE
Status: COMPLETED | OUTPATIENT
Start: 2025-05-28 | End: 2025-05-28

## 2025-05-28 RX ADMIN — BETAMETHASONE SODIUM PHOSPHATE AND BETAMETHASONE ACETATE 6 MG: 3; 3 INJECTION, SUSPENSION INTRA-ARTICULAR; INTRALESIONAL; INTRAMUSCULAR; SOFT TISSUE at 13:56:00

## 2025-05-28 NOTE — PROGRESS NOTES
Clinic Note     Assessment/Plan:  63 year old with     Right thumb CMC arthroplasty 12/12/2024-patient continues to make progress from a pinch and  strength standpoint.  Today pinch on the right side was measured to be 10 pounds and contralateral side is 13 pounds.  Would expect right side to be about 15 pounds given he is right-hand dominant.   strength is about 55 pounds and expected  strength in the right side to be 65 pounds.  Patient will have to be kept off work as he is not adequately recovered to return to work without restrictions.  Will reassess in 3 months.  Continue home exercise program.  He did have some irritation of the first dorsal compartment tendons and the steroid injection was injected there.  X-rays were stable without any interval subsidence.  Left thumb CMC osteoarthritis -status post 3 steroid injection  Left ring finger trigger digit status post 2 corticosteroid injection with no recurrence.    Follow up: 3 months    Injection:    Written consent was obtained.  The skin was prepped with alcohol.  Ethyl chloride spray was used anesthetize the superficial skin.  A 25-gauge needle was used to inject 1.5 mL mixture of 1 mL of 6 mg of betamethasone and 1 mL of 1% lidocaine into right thumb first dorsal compartment  Hemostasis achieved.  Band-Aid was applied.  Patient tolerated procedure without complication.    Imaging:   XR right thumb: evidence of cmc arthroplasty with slight subsidence but stable with no interval changes    Physical Exam:    Ht 6' (1.829 m)   Wt 250 lb (113.4 kg)   BMI 33.91 kg/m²     Skin: Incision healing well with no signs of infection.  Trace edema  Palpatory: Trace amount of tenderness at trapiziectomy site  Range of motion: Slightly decreased thumb opposition when compared to the contralateral side.  Thumb abduction motion symmetric to the contralateral side.        CC: Right base of thumb pain    HPI: This 65 year old left-hand-dominant male presents  with pain at the base of the thumb.  He has had pain there for a number of years.  Pain is moderate to severe.  It is worse with use of his hand.  He works as a making .  The pain is described as sharp and constant.  He has tried wearing hand brace.  The pain does not wake him up at night.    Interval Hx (2/12/2025): Patient reports improvement in pain compared to preop.  He still has some discomfort and pain from time to time at the base of his thumb.    Interval Hx (3/12/2025): Overall pain has improved significantly.  Last week he has had some increased pain.  He feels still fairly weak and has difficulty holding even a hammer at this point    Interval Hx (4/21/2025): Patient notes steady improvement in strength and function.  Patient can perform activity daily living without too much of an issue however continues to have difficulty with more strenuous activities.    Interval Hx (5/28/2025): Patient continues to make progress but still has difficulty strength.  Still has difficulty opening bottles.      Past Medical History:    Anemia    Diabetes (HCC)    from chronic steriod use    Family history of malignant hyperthermia    COUSIN HAD HISTORY    Flatulence/gas pain/belching    Heartburn    High blood pressure    History of blood transfusion    NO REACTION    Lab test positive for detection of COVID-19 virus    5/2020, subsequent testing negative    Microcytic anemia    Myasthenia gravis (HCC)    7/2020    OTHER DISEASES    Prostate nodule    Psoriasis    Sleep apnea    CPAP     Past Surgical History:   Procedure Laterality Date    Cataract Bilateral     Colonoscopy  2009    normal with diverticuli    Colonoscopy,diagnostic  09/09/2009    Performed by DEONTE TYLER at [facility] [place]    Hand surgery Right     right thumb    Mastoidectomy,simple  11/29/2022    Other surgical history  1978    foot    Other surgical history  2009    prostate biopsy, normal    Upper gi endoscopy,biopsy  09/09/2009     duodenal ulcer     Current Outpatient Medications   Medication Sig Dispense Refill    metFORMIN 500 MG Oral Tab Take 1 tablet (500 mg total) by mouth 2 (two) times daily with meals. 180 tablet 1    temazepam 15 MG Oral Cap Take 1 capsule (15 mg total) by mouth nightly as needed for Sleep. 30 capsule 1    pantoprazole 40 MG Oral Tab EC Take 1 tablet (40 mg total) by mouth 2 (two) times daily before meals. 180 tablet 1    lisinopril 40 MG Oral Tab Take 1 tablet (40 mg total) by mouth daily. 90 tablet 1    Mycophenolate Mofetil 500 MG Oral Tab TAKE 2 TABLETS BY MOUTH TWICE DAILY. 28 tablet 0    TALTZ 80 MG/ML Subcutaneous Solution Auto-injector       pyridostigmine 60 MG Oral Tab TAKE 1 TABLET BY MOUTH FIVE TIMES DAILY. 450 tablet 3    efgartigimod jamison-fcab (VYVGART) 400 MG/20ML Intravenous Solution injection Inject into the vein As Directed. EVERY 8 WEEK, 4 MORE SESSIONS LEFT      ferrous sulfate 325 (65 FE) MG Oral Tab EC Take 1 tablet (325 mg total) by mouth daily with breakfast.  0    Ascorbic Acid (VITAMIN C OR) Take 500 mg by mouth daily.      Acidophilus/Pectin Oral Cap Take 1 capsule by mouth daily.      omega-3 fatty acids 1000 MG Oral Cap Take 1,000 mg by mouth daily.      Turmeric 500 MG Oral Cap Take 1 capsule by mouth every evening.        VITAMIN D 400 UNIT OR CAPS 1 CAPSULE DAILY       No Known Allergies  Family History   Problem Relation Age of Onset    Diabetes Father     Other (brain tumors) Father     Other (uremic poisoning) Paternal Grandfather     Other (malignant hyperthermia) Maternal Cousin Male      Social History     Occupational History    Not on file   Tobacco Use    Smoking status: Former     Current packs/day: 0.00     Average packs/day: 1 pack/day for 20.0 years (20.0 ttl pk-yrs)     Types: Cigarettes     Start date: 1978     Quit date: 1998     Years since quittin.1    Smokeless tobacco: Never   Vaping Use    Vaping status: Never Used   Substance and Sexual Activity     Alcohol use: Yes     Alcohol/week: 8.0 standard drinks of alcohol     Types: 8 Standard drinks or equivalent per week     Comment: 1-2 per day during the week, 3-4 on weekends    Drug use: No    Sexual activity: Not on file     Wally Hart MD   Hand, Wrist, & Elbow Surgery  [email or web address]  t: [phone]  f: [phone]

## 2025-05-29 ENCOUNTER — TELEPHONE (OUTPATIENT)
Facility: LOCATION | Age: 65
End: 2025-05-29

## 2025-05-29 DIAGNOSIS — K22.70 BARRETT'S ESOPHAGUS WITHOUT DYSPLASIA: ICD-10-CM

## 2025-05-30 RX ORDER — PANTOPRAZOLE SODIUM 40 MG/1
40 TABLET, DELAYED RELEASE ORAL
Qty: 180 TABLET | Refills: 1 | OUTPATIENT
Start: 2025-05-30

## 2025-05-30 NOTE — TELEPHONE ENCOUNTER
Requested Prescriptions     Pending Prescriptions Disp Refills    PANTOPRAZOLE 40 MG Oral Tab EC [Pharmacy Med Name: PANTOPRAZOLE 40MG TABLETS] 180 tablet 1     Sig: TAKE 1 TABLET(40 MG) BY MOUTH TWICE DAILY BEFORE MEALS     Duplicate request   Refill sent on 5/19/25 #180 x 1   Denied.

## 2025-06-12 ENCOUNTER — TELEPHONE (OUTPATIENT)
Dept: NEUROLOGY | Facility: CLINIC | Age: 65
End: 2025-06-12

## 2025-06-12 NOTE — TELEPHONE ENCOUNTER
Myasthenia Gravis Activities of Daily Living (MG-ADL)  This questionnaire will assess the patient's symptoms and their impact on daily-living function.       None=0 Mild=1 Moderate=2 Severe=3 Score   Talking Normal          [x] Intermittent slurring or nasal speech      [] Constant slurring or nasal speech, but can be understood  [] Irqjdgrjl-[id number]-wixrnamhbo speech        [] 0   Chewing Normal    [x] Fatigue with solid food  [] Fatigue with soft food  [] Gastric tube    [] 0   Swallowing Normal          [x] Rare episode of choking        [] Frequent choking necessitating changes in diet  [] Gastric tube          [] 0   Breathing Normal      [x] Shortness of breath with exertion  [] Shortness of breath at rest    [] Ventilator dependence      [] 0   Impairment of ability to brush teeth or comb hair Normal        [x] Extra effort, but no rest period needed  [] Rest period needed      [] Cannot do one of these functions      [] 0   Impairment of ability to arise from a chair Normal      [] Mild, sometimes uses arms  [x] Moderate, always uses arms  [] Severe, requires assistance    [] 1   Double vision Normal    [x] Occurs, but not daily  [] Daily, but no constant  [] Constant    [] 0   Eyelid droop Normal    [] Occurs, but not daily  [x] Daily, but not constant  [] Constant    [] 1   TOTAL SCORE     2 out of 24            Fatigue  Feel fatigue-a little bit  Trouble starting things because I am Tired-A little bit  How run-down did you feel on average-A little bit  How Fatigued were you on average-A little bit    Are symptoms better/worse/about the same since starting vyvgart-better  Are symptoms worsening prior to next scheduled infusion (wear -off)-no  Missed doses-no

## 2025-06-16 ENCOUNTER — OFFICE VISIT (OUTPATIENT)
Age: 65
End: 2025-06-16
Attending: INTERNAL MEDICINE
Payer: COMMERCIAL

## 2025-06-16 VITALS
RESPIRATION RATE: 16 BRPM | OXYGEN SATURATION: 97 % | DIASTOLIC BLOOD PRESSURE: 78 MMHG | HEART RATE: 94 BPM | WEIGHT: 261 LBS | BODY MASS INDEX: 35 KG/M2 | TEMPERATURE: 98 F | SYSTOLIC BLOOD PRESSURE: 156 MMHG

## 2025-06-16 DIAGNOSIS — D50.0 IRON DEFICIENCY ANEMIA DUE TO CHRONIC BLOOD LOSS: Primary | ICD-10-CM

## 2025-06-16 DIAGNOSIS — G70.00 MYASTHENIA GRAVIS (HCC): ICD-10-CM

## 2025-06-16 DIAGNOSIS — R93.3 ABNORMAL FINDINGS ON DIAGNOSTIC IMAGING OF OTHER PARTS OF DIGESTIVE TRACT: ICD-10-CM

## 2025-06-16 DIAGNOSIS — Z13.29 SCREENING FOR THYROID DISORDER: ICD-10-CM

## 2025-06-16 LAB
ALBUMIN SERPL-MCNC: 4.9 G/DL (ref 3.2–4.8)
ALBUMIN/GLOB SERPL: 1.9 {RATIO} (ref 1–2)
ALP LIVER SERPL-CCNC: 106 U/L (ref 45–117)
ALT SERPL-CCNC: 71 U/L (ref 10–49)
ANION GAP SERPL CALC-SCNC: 10 MMOL/L (ref 0–18)
AST SERPL-CCNC: 72 U/L (ref ?–34)
BASOPHILS # BLD AUTO: 0.12 X10(3) UL (ref 0–0.2)
BASOPHILS NFR BLD AUTO: 2.1 %
BILIRUB SERPL-MCNC: 0.6 MG/DL (ref 0.2–1.1)
BUN BLD-MCNC: 13 MG/DL (ref 9–23)
CALCIUM BLD-MCNC: 10.1 MG/DL (ref 8.7–10.6)
CHLORIDE SERPL-SCNC: 101 MMOL/L (ref 98–112)
CO2 SERPL-SCNC: 27 MMOL/L (ref 21–32)
CREAT BLD-MCNC: 0.99 MG/DL (ref 0.7–1.3)
EGFRCR SERPLBLD CKD-EPI 2021: 83 ML/MIN/1.73M2 (ref 60–?)
EOSINOPHIL # BLD AUTO: 0.28 X10(3) UL (ref 0–0.7)
EOSINOPHIL NFR BLD AUTO: 4.8 %
ERYTHROCYTE [DISTWIDTH] IN BLOOD BY AUTOMATED COUNT: 20.3 %
FOLATE SERPL-MCNC: 12.2 NG/ML (ref 5.4–?)
GLOBULIN PLAS-MCNC: 2.6 G/DL (ref 2–3.5)
GLUCOSE BLD-MCNC: 222 MG/DL (ref 70–99)
HCT VFR BLD AUTO: 32.1 % (ref 39–53)
HGB BLD-MCNC: 9.3 G/DL (ref 13–17.5)
HGB RETIC QN AUTO: 22.9 PG (ref 28.2–36.6)
IMM GRANULOCYTES # BLD AUTO: 0.08 X10(3) UL (ref 0–1)
IMM GRANULOCYTES NFR BLD: 1.4 %
IMM RETICS NFR: 0.24 RATIO (ref 0.1–0.3)
IRON SATN MFR SERPL: 5 % (ref 20–50)
IRON SERPL-MCNC: 21 UG/DL (ref 65–175)
LDH SERPL L TO P-CCNC: 177 U/L (ref 120–246)
LYMPHOCYTES # BLD AUTO: 0.78 X10(3) UL (ref 1–4)
LYMPHOCYTES NFR BLD AUTO: 13.5 %
MCH RBC QN AUTO: 22.7 PG (ref 26–34)
MCHC RBC AUTO-ENTMCNC: 29 G/DL (ref 31–37)
MCV RBC AUTO: 78.5 FL (ref 80–100)
MONOCYTES # BLD AUTO: 0.64 X10(3) UL (ref 0.1–1)
MONOCYTES NFR BLD AUTO: 11.1 %
NEUTROPHILS # BLD AUTO: 3.88 X10 (3) UL (ref 1.5–7.7)
NEUTROPHILS # BLD AUTO: 3.88 X10(3) UL (ref 1.5–7.7)
NEUTROPHILS NFR BLD AUTO: 67.1 %
OSMOLALITY SERPL CALC.SUM OF ELEC: 293 MOSM/KG (ref 275–295)
PLATELET # BLD AUTO: 148 10(3)UL (ref 150–450)
POTASSIUM SERPL-SCNC: 4.2 MMOL/L (ref 3.5–5.1)
PROT SERPL-MCNC: 7.5 G/DL (ref 5.7–8.2)
RBC # BLD AUTO: 4.09 X10(6)UL (ref 3.8–5.8)
RETICS # AUTO: 81.2 X10(3) UL (ref 22.5–147.5)
RETICS/RBC NFR AUTO: 2 % (ref 0.5–2.5)
SODIUM SERPL-SCNC: 138 MMOL/L (ref 136–145)
T4 FREE SERPL-MCNC: 1 NG/DL (ref 0.8–1.7)
TOTAL IRON BINDING CAPACITY: 430 UG/DL (ref 250–425)
TRANSFERRIN SERPL-MCNC: 343 MG/DL (ref 215–365)
TSI SER-ACNC: 3.18 UIU/ML (ref 0.55–4.78)
URATE SERPL-MCNC: 6.8 MG/DL (ref 3.7–9.2)
VIT B12 SERPL-MCNC: 477 PG/ML (ref 211–911)
WBC # BLD AUTO: 5.8 X10(3) UL (ref 4–11)

## 2025-06-16 RX ORDER — MYCOPHENOLATE MOFETIL 500 MG/1
TABLET ORAL
Qty: 120 TABLET | Refills: 2 | Status: SHIPPED | OUTPATIENT
Start: 2025-06-16

## 2025-06-16 NOTE — CONSULTS
[facility] Hematology & Oncology Initial Consultation Note    Patient Name: Silvio Garner  Medical Record Number: [id number]    YOB: 1960   Date of Consultation: 6/16/2025     Reason for Consultation/Chief Complaint:  iron deficiency anemia, hepatosplenomegaly  Physician requesting consultation: Mega Cordero     Hematologic/Oncologic History:  12/31/2024: EGD/colonoscopy (Dr. Cordero). Biopsies with no evidence of malignancy. Advised for 3-year recall for EGD/colon  EGD: C5 M5 Sesay's esophagus, an 8 cm hiatal hernia, gastritis, and a gastric nodule.   Colonoscopy: 6 ascending colon polyps noted to be tubular adenoma   March 2025: Follow up with Dr. Cordero. At this time noted to have worsening iron deficiency anemia. 3 doses IV venofer ordered by Dr. Cordero. Plan for CT enterography and possibly PillCam.  IV Venofer 200 mg x 3 administered on (5/20), (6/3), and (6/19)  5/22/25: CT Enterography with irregular thickening of distal stomach, concern for possibly lymphoproliferative disorder with hazy soft tissue in region of proximal SMA and proximal BECKY along with hepatosplenomegaly. No discrete enlarged lymph nodes identified. Hematology referral placed  6/16/25: Initial consultation with me. Iron studies: iron serum 21, TIBC 430, iron saturation 5%, transferrin 343, B12 477, folate 12.2, ferritin 8 ng/mL. Iron deficit: 2510-1709 mg       History of Present Illness:  Mr. Garner is a 65 year old M with PMHx of Myasthenia Gravis (on treatment), HTN, HLD, NIDDM, Hiatal hernia, gastritis, gastric nodule, Sesay's esophagus, Colon polyps, diverticulosis, Internal hemorrhoids who presents to hematology/oncology today for evaluation and treatment recommendations of iron deficiency anemia.     He drinks alcohol daily (1-2 drinks weekdays daily; 3-4 drinks on weekends daily). He quit smoking in 1998 (previous 20 pack years).     His sister has metastatic pancreatic cancer (is being  treated in [place]). Father had benign brain tumor. Maternal uncle had leukemia.       Past Medical History:  Past Medical History:    Anemia    Diabetes (HCC)    from chronic steriod use    Family history of malignant hyperthermia    COUSIN HAD HISTORY    Flatulence/gas pain/belching    Heartburn    High blood pressure    History of blood transfusion    NO REACTION    Lab test positive for detection of COVID-19 virus    5/2020, subsequent testing negative    Microcytic anemia    Myasthenia gravis (HCC)    7/2020    OTHER DISEASES    Prostate nodule    Psoriasis    Sleep apnea    CPAP       Past Surgical History:  Past Surgical History:   Procedure Laterality Date    Cataract Bilateral     Colonoscopy  2009    normal with diverticuli    Colonoscopy,diagnostic  09/09/2009    Performed by DEONTE TYLER at [facility], [facility]    Hand surgery Right     right thumb    Mastoidectomy,simple  11/29/2022    Other surgical history  1978    foot    Other surgical history  2009    prostate biopsy, normal    Upper gi endoscopy,biopsy  09/09/2009    duodenal ulcer       Current Outpatient Medications:  Current Outpatient Medications on File Prior to Visit   Medication Sig Dispense Refill    metFORMIN 500 MG Oral Tab Take 1 tablet (500 mg total) by mouth 2 (two) times daily with meals. 180 tablet 1    temazepam 15 MG Oral Cap Take 1 capsule (15 mg total) by mouth nightly as needed for Sleep. 30 capsule 1    pantoprazole 40 MG Oral Tab EC Take 1 tablet (40 mg total) by mouth 2 (two) times daily before meals. 180 tablet 1    lisinopril 40 MG Oral Tab Take 1 tablet (40 mg total) by mouth daily. 90 tablet 1    Mycophenolate Mofetil 500 MG Oral Tab TAKE 2 TABLETS BY MOUTH TWICE DAILY. 28 tablet 0    TALTZ 80 MG/ML Subcutaneous Solution Auto-injector       pyridostigmine 60 MG Oral Tab TAKE 1 TABLET BY MOUTH FIVE TIMES DAILY. 450 tablet 3    efgartigimod jamison-fcab (VYVGART) 400 MG/20ML Intravenous Solution injection Inject  into the vein As Directed. EVERY 8 WEEK, 4 MORE SESSIONS LEFT      Ascorbic Acid (VITAMIN C OR) Take 500 mg by mouth daily.      Acidophilus/Pectin Oral Cap Take 1 capsule by mouth daily.      omega-3 fatty acids 1000 MG Oral Cap Take 1,000 mg by mouth daily.      Turmeric 500 MG Oral Cap Take 1 capsule by mouth every evening.        VITAMIN D 400 UNIT OR CAPS 1 CAPSULE DAILY       Current Facility-Administered Medications on File Prior to Visit   Medication Dose Route Frequency Provider Last Rate Last Admin    [COMPLETED] iron sucrose (Venofer) 200 mg injection  200 mg Intravenous Once Mega Cordero DO   Stopped at 25 1034    [COMPLETED] betamethasone sodium phosphate & acetate (Celestone) 6 (3-3) MG/ML injection 6 mg  6 mg Intra-articular Once Remberto Hart MD   6 mg at 25 1356    [COMPLETED] iron sucrose (Venofer) 200 mg injection  200 mg Intravenous Once Mega Cordero DO   Stopped at 25 1039       Allergies:   No Known Allergies    Family Medical History:  Family History   Problem Relation Age of Onset    Diabetes Father     Other (brain tumors) Father     Other (uremic poisoning) Paternal Grandfather     Other (malignant hyperthermia) Maternal Cousin Male        Social History:  Social History     Social History Narrative    Not on file     Social History     Socioeconomic History    Marital status:    Tobacco Use    Smoking status: Former     Current packs/day: 0.00     Average packs/day: 1 pack/day for 20.0 years (20.0 ttl pk-yrs)     Types: Cigarettes     Start date: 1978     Quit date: 1998     Years since quittin.1    Smokeless tobacco: Never   Vaping Use    Vaping status: Never Used   Substance and Sexual Activity    Alcohol use: Yes     Alcohol/week: 8.0 standard drinks of alcohol     Types: 8 Standard drinks or equivalent per week     Comment: 1-2 per day during the week, 3-4 on weekends    Drug use: No   Other Topics Concern    Caffeine Concern Yes      Comment: Occ    Exercise No     Comment: not really     Social Drivers of Health     Food Insecurity: No Food Insecurity (3/3/2025)    Received from [facility]    Hunger Vital Sign     Worried About Running Out of Food in the Last Year: Never true     Ran Out of Food in the Last Year: Never true   Transportation Needs: Unknown (3/3/2025)    Received from [facility]    PRAPARE - Transportation     Lack of Transportation (Medical): Patient declined     Lack of Transportation (Non-Medical): No   Housing Stability: Low Risk  (7/13/2024)    Received from [facility]    Housing Stability Vital Sign     Unable to Pay for Housing in the Last Year: No     Number of Places Lived in the Last Year: 1     Unstable Housing in the Last Year: No       Review of Systems:  A 10-point ROS was done with pertinent positives and negatives per the HPI.     ECOG Performance Status: 1    Vital Signs:  Height: --  Weight: 118.4 kg (261 lb) (06/16 0857)  BSA (Calculated - sq m): --  Pulse: 94 (06/16 0857)  BP: 156/78 (06/16 0857)  Temp: 97.5 °F (36.4 °C) (06/16 0857)  Do Not Use - Resp Rate: --  SpO2: 97 % (06/16 0857)  Wt Readings from Last 6 Encounters:   06/16/25 118.4 kg (261 lb)   05/28/25 118.4 kg (261 lb)   05/19/25 118.4 kg (261 lb)   04/21/25 117.9 kg (260 lb)   03/12/25 117.9 kg (260 lb)   03/10/25 117.9 kg (260 lb)       Physical Examination:  General: Well-nourished and well-appearing. No acute distress. Pleasant. Obesity noted.   Eyes: EOMI  Lymph Nodes: No cervical lymphadenopathy.  Respiratory: Lungs clear to auscultation bilaterally.  Cardiovascular: Regular rate and rhythm, no murmurs. No lower extremity edema.   GI: Soft, mildly distended, hepatosplenomegaly (Palpated)  Heme: normal capillary refill. No ecchymosis, petechiae, or purpura.  Neurological: Alert and oriented. No apparent focal sensory or motor deficits  Skin: no rashes or lesions.  Psychiatric:  Normal mood and affect.    Data Review  Laboratory Studies:  No results for input(s): \"WBC\", \"HGB\", \"HCT\", \"PLT\", \"MCV\", \"RDW\", \"NEPRELIM\" in the last 168 hours.  No results for input(s): \"NA\", \"K\", \"CL\", \"CO2\", \"BUN\", \"CREATSERUM\", \"GFRAA\", \"GFRNAA\", \"GLU\", \"CA\", \"PHOS\", \"TP\", \"ALB\", \"ALKPHO\", \"AST\", \"ALT\", \"BILT\" in the last 168 hours.    Invalid input(s): \"MAG\"  No results for input(s): \"PT\", \"INR\", \"PTT\", \"FIB\" in the last 168 hours.    Radiographic Studies:  XR HAND (MIN 3 VIEWS), RIGHT (CPT=73130)  Result Date: 6/2/2025  PROCEDURE: XR HAND (MIN 3 VIEWS), RIGHT (CPT=73130)  COMPARISON: [facility], XR HAND (MIN 3 VIEWS), RIGHT (CPT=73130), 3/12/2025, 11:16 AM.  INDICATIONS: Pt here for follow up to right hand surgery on 12/12/2024. Pt states still has pain in hand  TECHNIQUE: 3 views were obtained.   FINDINGS:  BONES: Post trapezium resection with unusual appearance of the CMC joint space, appears dislocated, unchanged from prior.  Two small accessory ossicles are seen in the residual joint space, unchanged from prior SOFT TISSUES: Unremarkable  IMPRESSION:   1. Postsurgical changes, unchanged    Dictated by (CST): AI DAWKINS MD on 6/02/2025 at 3:41 PM     Finalized by (CST): AI DAWKINS MD on 6/02/2025 at 3:43 PM          CT ENTEROGRAPHY ABD/PEL W CONTRAST (CPT=74177)  Result Date: 5/22/2025  CONCLUSION:   1. Irregular thickening of the distal stomach.  Endoscopy should be considered if not already performed.  Fluid in the esophagus may indicate gastroesophageal reflux.  Moderate hiatal hernia containing the proximal stomach.  The terminal ileum is unremarkable.  No intestinal mass or obstruction is identified.  No inflammatory changes.  Diverticulosis without evidence of acute diverticulitis.  2. Hepatosplenomegaly.  Hazy soft tissues in the region of the proximal SMA and proximal BECKY.  Workup for lymphoproliferative disorder should be considered.  3. Additional  chronic or incidental findings are described in the body of this report.   These findings were communicated with Dr. Cordero by Dr. Robert Orellana at 2244 hours using epic secure chat on 05/22/2025.  This result will also be faxed to his office on the next business day.   elm-remote     Dictated by (CST): Robert Orellana MD on 5/22/2025 at 10:35 PM     Finalized by (CST): Robert Orellana MD on 5/22/2025 at 10:44 PM          Procedures/Pathology:  EGD and Colonoscopy (12/31/2024):  Hiatal hernia  Gastritis  Gastric nodule  Sesay's esophagus  Colon polyps  Diverticulosis  Internal hemorrhoids     Pathology:  Final Diagnosis:   A: Duodenum, biopsies:   -Strips of superficial small intestinal mucosa without diagnostic abnormality.   -No increase in intraepithelial lymphocytes.   -No evidence of architectural distortion, dysplasia or malignancy.     B: Stomach, random, biopsies:   -Antral mucosa showing reactive gastropathy/gastritis.   -Fundic mucosa with features suggestive of proton pump inhibitor effect.   -Giemsa stain negative for Helicobacter pylori.   -No evidence of intestinal metaplasia, dysplasia or malignancy.     C: Antral nodules, biopsies:   -Polypoid fragments of antral mucosa with marked reactive change   and mild chronic inflammation.   -No evidence of intestinal metaplasia, dysplasia or malignancy.   -See comment.     D: Sesay's esophagus, 35 cm, biopsies:   -Consistent with Sesay's esophagus.   -Background moderate chronic and patchy acute inflammation.   -Negative for dysplasia.     E: Sesay's esophagus, 33 cm, biopsies:   -Consistent with Sesay's esophagus.   -Background moderate chronic and patchy acute inflammation.   -Negative for dysplasia.     F: Sesay's esophagus, 31 cm, biopsies:   -Consistent with Sesay's esophagus.   -Background moderate chronic and patchy acute inflammation.   -Negative for dysplasia.     G: Sesay's esophagus, 30 cm, biopsies:   -Consistent with Sesay's  esophagus.   -Background moderate chronic and patchy acute inflammation.   -See comment.     H: Colon, ascending, polyps x 6:   -Multiple fragments of tubular adenomas.   -Negative for malignancy.     I: Colon, sigmoid, polyp:   -Hyperplastic polyp.   -Negative for dysplasia or malignancy.     Comment:   C:The features are compatible with polypoid reactive gastropathy/hyperplastic polyp changes in the appropriate clinical and endoscopic context.  Of note, significant submucosal tissue is identified.  As such, a deeper/submucosal nodule cannot be   definitively excluded.  Endoscopic correlation is necessary.     G: There is no definitive evidence of dysplasia.  However, portions of the surface mucosa are denuded/stripped, limiting evaluation.  Multiple levels were reviewed.      EGD (May 27, 2021):  hiatal hernia, long segment Sesay's esophagus, gastritis, gastric antral nodules      Pathology:  A.  Stomach, biopsy:  -Unremarkable gastric mucosa.  -No Helicobacter seen with H&E staining.     B.  Gastric antral nodule, biopsy:  -Markedly inflamed gastric mucosa with ulceration.  -No Helicobacter or CMV identified with immunohistochemical staining.  -No fungus identified with PAS staining.     C.  Esophagus, 37 cm, biopsy:  -Gastric mucosa with intestinal metaplasia; negative for dysplasia.     D.  Esophagus, 35 cm, biopsy:  -Gastric mucosa with intestinal metaplasia; negative for dysplasia.  -Squamous mucosa with esophagitis.              No CMV identified with immunohistochemical staining.              No fungus identified with PAS staining.     E.  Esophagus, 33 cm, biopsy:  -Gastric mucosa with chronic inflammation.  -No Helicobacter seen with immunohistochemical staining.     F.  Esophagus, 31 cm, biopsy:              -Gastric mucosa with intestinal metaplasia; negative for dysplasia..     G.  Esophagus, 30 cm, biopsy:              -Gastric mucosa with intestinal metaplasia; negative for dysplasia.               -Squamous mucosa with esophagitis.                          No CMV identified with immunohistochemical staining.                          No fungus identified with PAS staining.     H.  Distal esophagus, site of prior ulcer margin, biopsy:              -Gastric mucosa with intestinal metaplasia; negative for dysplasia.              -Squamous mucosa with esophagitis.                          No CMV identified with immunohistochemical staining.                          No fungus identified with PAS staining.     I.  Distal esophagus, site of prior ulcer center, biopsy:              -Erosive esophagitis.                          No CMV identified with immunohistochemical staining                          No fungus identified with PAS staining     EGD (December 18, 2020):  esophageal ulcer, hiatal hernia, cratered gastric ulcer with visible vessel, gastritis,  long segment Sesay's esophagus      Pathology:  1.  STOMACH, BIOPSY:        - GASTRIC OXYNTIC MUCOSA WITH SUPERFICIAL REACTIVE/REGENERATIVE   CHANGES, ADMIXED        WITH MINIMAL CHRONIC INFLAMMATION.        - NO HELICOBACTER ORGANISMS IDENTIFIED.     2.  ESOPHAGEAL ULCER, BIOPSY:        - GLANDULAR MUCOSA WITH INTESTINAL METAPLASIA.        - NO DYSPLASIA OR MALIGNANCY IDENTIFIED.          EGD and colonoscopy (September 9, 2009):  1. Small duodenal ulcer.  2. Severe esophagitis.  3. Hiatal hernia.      Impression/Recommendations:      Iron Deficiency Anemia  - 6/16/25: Initial consultation with me. Iron studies: iron serum 21, TIBC 430, iron saturation 5%, transferrin 343, B12 477, folate 12.2, ferritin 8 ng/mL. Iron deficit: 2849-8111 mg   - IV Venofer 200 mg x 3 administered on (5/20), (6/3), and (6/19)    Recommendations:  - IV Infed 1000 mg. Discussed with patient. He agrees. RTC in 4 weeks for labs/visit  - Will likely need PillCam for completion of IVETTE workup, given acute drop in Hb from 12/2024 to 4/2025 (10 to 7.9)    Nonspecific haziness in proximal SMA  and proximal BECKY on CT A/P         Hepatosplenomegaly    Recommendations:  - CT Chest with IV contrast   - PET-CT scan ordered  - Biopsy of any PET avid lesion (if there is any abnormal uptake)      Follow Up: Return to clinic in 4 weeks for labs and visit with me.     Charmaine Gustafson D.O.  [facility]      Note to patient: The 21 Century Cures Act makes medical notes like these available to patients in the interest of transparency. However, be advised this is a medical document. It is intended as peer to peer communication. It is written in medical language and may contain abbreviations or verbiage that are unfamiliar. It may appear blunt or direct. Medical documents are intended to carry relevant information, facts as evident, and the clinical opinion of the practitioner.       In reviewing this note, please be advised that Dragon Voice Recognition software used to dictate the note may have made errors in recognizing some of the words or phrases.

## 2025-06-16 NOTE — TELEPHONE ENCOUNTER
Medication: MYCOPHENOLATE MOFETIL 500 MG Oral Tab      Date of last refill: 04/14/2025 (#28/0)  [place]     Last office visit: 03/10/2025  Due back to clinic per last office note:  6 Months   Date next office visit scheduled:    Future Appointments   Date Time Provider Department Center   6/19/2025 10:00 AM INFUSION [facility] NURSE SGINP [facility] SUB GI   6/24/2025  1:30 PM NP TX RN8 NP SW Inf [place] [facility]   8/27/2025 11:00 AM Remberto Hart MD [facility] ORTHO LB [facility] [place]   8/29/2025 10:30 AM Dayna Gibson MD [facility] 36 [id number]   9/22/2025  1:15 PM Alma Villafaan DO ENINAPER [facility] [place]           Last OV note recommendation:    ASSESSMENT/ACTIVE PROBLEM LIST:           Encounter Diagnoses   Name Primary?    Myasthenia gravis (HCC) Yes    Elevated liver function tests      Fatty liver           Discussion/Plan:  MG seropositive   Proximal lower extremity fatigue less frequent with Vyvgart  Due to wanting to avoid getting IV infusions, and affecting veins, recommend switching to Vyvgart Hytrulo, 4 weeks on, and 5 weeks off  Continue Cellcept 1000mg BID  Monitor LFTs and continue to follow up with GI- ok with them to continue Cellcept  Continue mestinon 60mg QID  Check CBC/CMP q 3 months     Elevated LFTs-  Per GI, thought to be due to fatty liver, but potentially could be a component also of Cellcept  GI ok with patient continuing Cellcept  LFT's improved  Continue monitoring        Requested Prescriptions        No prescriptions requested or ordered in this encounter            We discussed in depth regarding the diagnosis, prognosis, treatment. The patient was given ample opportunity to ask questions. All questions and concerns were addressed.        Yeni Villafana DO  Neuromuscular and General Neurology  [facility]            Myasthenia Gravis Activities of Daily Living (MG-ADL)  This questionnaire will assess the patient's symptoms and their impact on daily-living  function.          None=0 Mild=1 Moderate=2 Severe=3 Score   Talking Normal              [x]  Intermittent slurring or nasal speech        []  Constant slurring or nasal speech, but can be understood  []  Dphiofyvu-ei-yfxijjehcg speech           []  0   Chewing Normal     [x]  Fatigue with solid food  []  Fatigue with soft food  []  Gastric tube     []  0   Swallowing Normal              [x]  Rare episode of choking           []  Frequent choking necessitating changes in diet  []  Gastric tube              []  0   Breathing Normal        [x]  Shortness of breath with exertion  []  Shortness of breath at rest     []  Ventilator dependence        []  0   Impairment of ability to brush teeth or comb hair Normal           [x]  Extra effort, but no rest period needed  []  Rest period needed        []  Cannot do one of these functions        []  0   Impairment of ability to arise from a chair Normal        []  Mild, sometimes uses arms  [x]  Moderate, always uses arms  []  Severe, requires assistance     []  1   Double vision Normal     [x]  Occurs, but not daily  []  Daily, but no constant  []  Constant     []  0   Eyelid droop Normal     [x]  Occurs, but not daily  []  Daily, but not constant  []  Constant     []  0   TOTAL SCORE         1 out of 24

## 2025-06-16 NOTE — PATIENT INSTRUCTIONS
I have ordered 1 dose of IV Iron Dextran (INFeD) for you. Please schedule this once insurance approves the IV iron (our team will call you to make the appointment).    We attached some information about iron-rich foods (kale, spinach, dark/leafy vegetables). Please try to incorporate these into your diet.     Please schedule a follow up appointment to see me again 4 weeks after your infusion. We will repeat iron studies (labs) at this time to see if you absorbed the iron appropriately.    We have placed orders for you to complete a PET Scan and CT Chest. To schedule, please call [phone]. Please have these completed before our next scheduled appointment together.

## 2025-06-16 NOTE — PROGRESS NOTES
Pt here for new consult regarding iron deficiency anemia. Medications and medical history reviewed. Pt states he has received 2 iron infusions (200mg Venofer), with his 3rd and final infusion on Thursday. Pt states he feels significantly better after the 2 infusions. He now has energy, no more dyspnea on exertion, no longer lightheaded. No rectal bleeding or blood in the stool. He states in 12/2024 he had rectal bleeding - had EGD/colon in 12/2024 which was normal. Hx of RUDI bleed about 5 years ago, he did receive a blood transfusion at that time.         Education Record    Learner:  Patient and Spouse    Disease / Diagnosis: iron deficiency anemia    Barriers / Limitations:  None   Comments:    Method:  Discussion   Comments:    General Topics:  Medication, Pain, Side effects and symptom management, and Plan of care reviewed   Comments:    Outcome:  Observed demonstration and Shows understanding   Comments:

## 2025-06-18 ENCOUNTER — APPOINTMENT (OUTPATIENT)
Dept: URBAN - METROPOLITAN AREA CLINIC 244 | Age: 65
Setting detail: DERMATOLOGY
End: 2025-06-19

## 2025-06-18 DIAGNOSIS — L40.0 PSORIASIS VULGARIS: ICD-10-CM

## 2025-06-18 DIAGNOSIS — Z79.899 OTHER LONG TERM (CURRENT) DRUG THERAPY: ICD-10-CM

## 2025-06-18 LAB — SOL TRANSFERRIN RECEPTOR: 61.5 NMOL/L

## 2025-06-18 PROCEDURE — OTHER COUNSELING: OTHER

## 2025-06-18 PROCEDURE — OTHER PRESCRIPTION MEDICATION MANAGEMENT: OTHER

## 2025-06-18 PROCEDURE — OTHER TALTZ MONITORING: OTHER

## 2025-06-18 PROCEDURE — OTHER ORDER TESTS: OTHER

## 2025-06-18 PROCEDURE — 99214 OFFICE O/P EST MOD 30 MIN: CPT

## 2025-06-18 PROCEDURE — OTHER MIPS QUALITY: OTHER

## 2025-06-18 PROCEDURE — OTHER DIAGNOSIS COMMENT: OTHER

## 2025-06-18 ASSESSMENT — LOCATION DETAILED DESCRIPTION DERM
LOCATION DETAILED: RIGHT KNEE
LOCATION DETAILED: LEFT ELBOW
LOCATION DETAILED: LEFT LATERAL ABDOMEN
LOCATION DETAILED: PERIUMBILICAL SKIN
LOCATION DETAILED: RIGHT ELBOW
LOCATION DETAILED: LEFT KNEE

## 2025-06-18 ASSESSMENT — LOCATION SIMPLE DESCRIPTION DERM
LOCATION SIMPLE: LEFT ELBOW
LOCATION SIMPLE: LEFT KNEE
LOCATION SIMPLE: RIGHT KNEE
LOCATION SIMPLE: ABDOMEN
LOCATION SIMPLE: RIGHT ELBOW

## 2025-06-18 ASSESSMENT — BSA PSORIASIS: % BODY COVERED IN PSORIASIS: 11

## 2025-06-18 ASSESSMENT — PGA PSORIASIS: PGA PSORIASIS 2020: MODERATE

## 2025-06-18 ASSESSMENT — LOCATION ZONE DERM
LOCATION ZONE: TRUNK
LOCATION ZONE: LEG
LOCATION ZONE: ARM

## 2025-06-18 ASSESSMENT — ITCH NUMERIC RATING SCALE: HOW SEVERE IS YOUR ITCHING?: 0

## 2025-06-23 ENCOUNTER — MED REC SCAN ONLY (OUTPATIENT)
Dept: FAMILY MEDICINE CLINIC | Facility: CLINIC | Age: 65
End: 2025-06-23

## 2025-06-24 ENCOUNTER — APPOINTMENT (OUTPATIENT)
Age: 65
End: 2025-06-24
Attending: INTERNAL MEDICINE
Payer: COMMERCIAL

## 2025-06-24 ENCOUNTER — TELEPHONE (OUTPATIENT)
Dept: NEUROLOGY | Facility: CLINIC | Age: 65
End: 2025-06-24

## 2025-06-26 ENCOUNTER — RX ONLY (RX ONLY)
Age: 65
End: 2025-06-26

## 2025-06-26 ENCOUNTER — OFFICE VISIT (OUTPATIENT)
Age: 65
End: 2025-06-26
Attending: INTERNAL MEDICINE
Payer: COMMERCIAL

## 2025-06-26 VITALS
WEIGHT: 262.19 LBS | DIASTOLIC BLOOD PRESSURE: 83 MMHG | OXYGEN SATURATION: 98 % | BODY MASS INDEX: 36 KG/M2 | HEART RATE: 66 BPM | SYSTOLIC BLOOD PRESSURE: 152 MMHG | RESPIRATION RATE: 18 BRPM | TEMPERATURE: 98 F

## 2025-06-26 DIAGNOSIS — D64.9 ANEMIA, UNSPECIFIED TYPE: Primary | ICD-10-CM

## 2025-06-26 RX ORDER — RISANKIZUMAB-RZAA 150 MG/ML
INJECTION SUBCUTANEOUS
Qty: 1 | Refills: 2 | Status: ERX | COMMUNITY
Start: 2025-06-26

## 2025-06-26 NOTE — PROGRESS NOTES
Education Record    Learner:  Patient    Disease / Diagnosis: anemia    Barriers / Limitations:  None   Comments:    Method:  Brief focused   Comments:    General Topics:  Plan of care reviewed   Comments:    Outcome:  Shows understanding   Comments:    Pt received test dose and remaining infed without complication.  Pt discharged in stable condition. Pt to rtc in 4 wks for pl, md visit.

## 2025-06-27 NOTE — TELEPHONE ENCOUNTER
Received signed form back from provider. Faxed to [facility]. Confirmation received. Endorsed to scanning.

## 2025-06-30 ENCOUNTER — RX ONLY (RX ONLY)
Age: 65
End: 2025-06-30

## 2025-06-30 RX ORDER — RISANKIZUMAB-RZAA 150 MG/ML
INJECTION SUBCUTANEOUS
Qty: 1 | Refills: 1 | Status: ERX | COMMUNITY
Start: 2025-06-30

## 2025-07-03 ENCOUNTER — LAB ENCOUNTER (OUTPATIENT)
Dept: LAB | Age: 65
End: 2025-07-03
Attending: STUDENT IN AN ORGANIZED HEALTH CARE EDUCATION/TRAINING PROGRAM
Payer: COMMERCIAL

## 2025-07-03 DIAGNOSIS — D50.9 IRON DEFICIENCY ANEMIA, UNSPECIFIED IRON DEFICIENCY ANEMIA TYPE: ICD-10-CM

## 2025-07-03 LAB
DEPRECATED HBV CORE AB SER IA-ACNC: 222 NG/ML (ref 50–336)
IRON SATN MFR SERPL: 24 % (ref 20–50)
IRON SERPL-MCNC: 90 UG/DL (ref 65–175)
TOTAL IRON BINDING CAPACITY: 377 UG/DL (ref 250–425)
TRANSFERRIN SERPL-MCNC: 292 MG/DL (ref 215–365)

## 2025-07-03 PROCEDURE — 82728 ASSAY OF FERRITIN: CPT

## 2025-07-03 PROCEDURE — 84466 ASSAY OF TRANSFERRIN: CPT

## 2025-07-03 PROCEDURE — 83540 ASSAY OF IRON: CPT

## 2025-07-03 PROCEDURE — 36415 COLL VENOUS BLD VENIPUNCTURE: CPT

## 2025-07-06 DIAGNOSIS — G70.00 MYASTHENIA GRAVIS (HCC): ICD-10-CM

## 2025-07-07 RX ORDER — PYRIDOSTIGMINE BROMIDE 60 MG/1
60 TABLET ORAL
Qty: 450 TABLET | Refills: 3 | Status: SHIPPED | OUTPATIENT
Start: 2025-07-07

## 2025-07-07 NOTE — TELEPHONE ENCOUNTER
Medication: PYRIDOSTIGMINE 60 MG Oral Tab      Date of last refill: 02/112/2024 (#450/3)  [place]     Last office visit: 03/10/2025  Due back to clinic per last office note:  6 months   Date next office visit scheduled:    Future Appointments   Date Time Provider Department Center   7/9/2025 12:30 PM EH PET DOSE RM1 [facility]   7/9/2025  1:45 PM EH PET SCANNER [facility]   7/9/2025  3:00 PM EH CT MAIN RM4 [facility]   7/24/2025 11:45 AM NP OOT NP [facility]   7/24/2025 12:15 PM Charmaine Gustafson DO NP [facility]   8/27/2025 11:00 AM Remberto Hart MD [facility] ORTHO [facility]   8/29/2025 10:30 AM Dayna Gibson MD [facility] [id number]   9/22/2025  1:15 PM Alma Villafana DO ENINAPER [facility] [place]           Last OV note recommendation:    ASSESSMENT/ACTIVE PROBLEM LIST:           Encounter Diagnoses   Name Primary?    Myasthenia gravis (HCC) Yes    Elevated liver function tests      Fatty liver           Discussion/Plan:  MG seropositive   Proximal lower extremity fatigue less frequent with Vyvgart  Due to wanting to avoid getting IV infusions, and affecting veins, recommend switching to Vyvgart Hytrulo, 4 weeks on, and 5 weeks off  Continue Cellcept 1000mg BID  Monitor LFTs and continue to follow up with GI- ok with them to continue Cellcept  Continue mestinon 60mg QID  Check CBC/CMP q 3 months     Elevated LFTs-  Per GI, thought to be due to fatty liver, but potentially could be a component also of Cellcept  GI ok with patient continuing Cellcept  LFT's improved  Continue monitoring        Requested Prescriptions        No prescriptions requested or ordered in this encounter            We discussed in depth regarding the diagnosis, prognosis, treatment. The patient was given ample opportunity to ask questions. All questions and concerns were addressed.        Yeni Villafana DO  Neuromuscular and General Neurology  [facility]

## 2025-07-09 ENCOUNTER — HOSPITAL ENCOUNTER (OUTPATIENT)
Dept: CT IMAGING | Facility: HOSPITAL | Age: 65
Discharge: HOME | End: 2025-07-09
Attending: INTERNAL MEDICINE
Payer: COMMERCIAL

## 2025-07-09 ENCOUNTER — HOSPITAL ENCOUNTER (OUTPATIENT)
Dept: NUCLEAR MEDICINE | Facility: HOSPITAL | Age: 65
Discharge: HOME | End: 2025-07-09
Attending: INTERNAL MEDICINE
Payer: COMMERCIAL

## 2025-07-09 DIAGNOSIS — R93.3 ABNORMAL FINDINGS ON DIAGNOSTIC IMAGING OF OTHER PARTS OF DIGESTIVE TRACT: ICD-10-CM

## 2025-07-09 LAB — GLUCOSE BLD-MCNC: 140 MG/DL (ref 70–99)

## 2025-07-09 PROCEDURE — 82962 GLUCOSE BLOOD TEST: CPT

## 2025-07-09 PROCEDURE — 78815 PET IMAGE W/CT SKULL-THIGH: CPT | Performed by: INTERNAL MEDICINE

## 2025-07-09 PROCEDURE — 71260 CT THORAX DX C+: CPT | Performed by: INTERNAL MEDICINE

## 2025-07-16 ENCOUNTER — APPOINTMENT (OUTPATIENT)
Dept: URBAN - METROPOLITAN AREA CLINIC 244 | Age: 65
Setting detail: DERMATOLOGY
End: 2025-07-16

## 2025-07-16 ENCOUNTER — TELEPHONE (OUTPATIENT)
Age: 65
End: 2025-07-16

## 2025-07-16 DIAGNOSIS — D50.0 IRON DEFICIENCY ANEMIA DUE TO CHRONIC BLOOD LOSS: Primary | ICD-10-CM

## 2025-07-16 DIAGNOSIS — R16.2 HEPATOSPLENOMEGALY: ICD-10-CM

## 2025-07-16 DIAGNOSIS — L40.0 PSORIASIS VULGARIS: ICD-10-CM

## 2025-07-16 PROCEDURE — OTHER SKYRIZI MONITORING: OTHER

## 2025-07-16 PROCEDURE — 99214 OFFICE O/P EST MOD 30 MIN: CPT

## 2025-07-16 PROCEDURE — OTHER DIAGNOSIS COMMENT: OTHER

## 2025-07-16 PROCEDURE — OTHER COUNSELING: OTHER

## 2025-07-16 PROCEDURE — OTHER PRESCRIPTION MEDICATION MANAGEMENT: OTHER

## 2025-07-16 ASSESSMENT — LOCATION DETAILED DESCRIPTION DERM
LOCATION DETAILED: PERIUMBILICAL SKIN
LOCATION DETAILED: RIGHT ELBOW
LOCATION DETAILED: LEFT KNEE
LOCATION DETAILED: LEFT ELBOW
LOCATION DETAILED: RIGHT KNEE
LOCATION DETAILED: LEFT LATERAL ABDOMEN

## 2025-07-16 ASSESSMENT — LOCATION ZONE DERM
LOCATION ZONE: LEG
LOCATION ZONE: ARM
LOCATION ZONE: TRUNK

## 2025-07-16 ASSESSMENT — PGA PSORIASIS: PGA PSORIASIS 2020: MODERATE

## 2025-07-16 ASSESSMENT — LOCATION SIMPLE DESCRIPTION DERM
LOCATION SIMPLE: LEFT ELBOW
LOCATION SIMPLE: RIGHT ELBOW
LOCATION SIMPLE: ABDOMEN
LOCATION SIMPLE: LEFT KNEE
LOCATION SIMPLE: RIGHT KNEE

## 2025-07-16 ASSESSMENT — ITCH NUMERIC RATING SCALE: HOW SEVERE IS YOUR ITCHING?: 6

## 2025-07-16 ASSESSMENT — BSA PSORIASIS: % BODY COVERED IN PSORIASIS: 10

## 2025-07-16 NOTE — TELEPHONE ENCOUNTER
I called Silvio to relay recommendations from Multidisciplinary Tumor Board today.    I reviewed CT Chest and PET-CT results with him. No abnormal FDG uptake on PET-CT scan. No discrete mass or lymphadenopathy to biopsy.    Given anemia, splenomegaly, anemia, and mild thrombocytopenia, I recommend Bone marrow biopsy to rule out underlying blood disorder or blood cancer.    He is agreeable to procedure. IR CT BMBx ordered.    We will see him 2 weeks after BMBx to review results and next steps.    Charmaine Gustafson D.O.  [facility]

## 2025-07-18 ENCOUNTER — NURSE ONLY (OUTPATIENT)
Dept: LAB | Facility: HOSPITAL | Age: 65
End: 2025-07-18
Attending: INTERNAL MEDICINE
Payer: COMMERCIAL

## 2025-07-18 ENCOUNTER — HOSPITAL ENCOUNTER (OUTPATIENT)
Dept: CT IMAGING | Facility: HOSPITAL | Age: 65
Discharge: HOME | End: 2025-07-18
Attending: INTERNAL MEDICINE
Payer: COMMERCIAL

## 2025-07-18 VITALS
SYSTOLIC BLOOD PRESSURE: 144 MMHG | HEIGHT: 72 IN | OXYGEN SATURATION: 98 % | RESPIRATION RATE: 15 BRPM | WEIGHT: 260 LBS | HEART RATE: 70 BPM | TEMPERATURE: 97 F | BODY MASS INDEX: 35.21 KG/M2 | DIASTOLIC BLOOD PRESSURE: 83 MMHG

## 2025-07-18 DIAGNOSIS — D50.0 IRON DEFICIENCY ANEMIA DUE TO CHRONIC BLOOD LOSS: Primary | ICD-10-CM

## 2025-07-18 DIAGNOSIS — D50.0 IRON DEFICIENCY ANEMIA DUE TO CHRONIC BLOOD LOSS: ICD-10-CM

## 2025-07-18 DIAGNOSIS — R16.2 HEPATOSPLENOMEGALY: ICD-10-CM

## 2025-07-18 LAB
ERYTHROCYTE [DISTWIDTH] IN BLOOD BY AUTOMATED COUNT: 23.7 %
GLUCOSE BLD-MCNC: 159 MG/DL (ref 70–99)
HCT VFR BLD AUTO: 37 % (ref 39–53)
HGB BLD-MCNC: 11.4 G/DL (ref 13–17.5)
INR BLD: 1.14 (ref 0.8–1.2)
MCH RBC QN AUTO: 26.8 PG (ref 26–34)
MCHC RBC AUTO-ENTMCNC: 30.8 G/DL (ref 31–37)
MCV RBC AUTO: 87.1 FL (ref 80–100)
PLATELET # BLD AUTO: 145 10(3)UL (ref 150–450)
PROTHROMBIN TIME: 14.7 SECONDS (ref 11.6–14.8)
RBC # BLD AUTO: 4.25 X10(6)UL (ref 3.8–5.8)
WBC # BLD AUTO: 5.5 X10(3) UL (ref 4–11)

## 2025-07-18 PROCEDURE — 88291 CYTO/MOLECULAR REPORT: CPT | Performed by: INTERNAL MEDICINE

## 2025-07-18 PROCEDURE — 85610 PROTHROMBIN TIME: CPT

## 2025-07-18 PROCEDURE — 88264 CHROMOSOME ANALYSIS 20-25: CPT | Performed by: INTERNAL MEDICINE

## 2025-07-18 PROCEDURE — 36415 COLL VENOUS BLD VENIPUNCTURE: CPT

## 2025-07-18 PROCEDURE — 88333 PATH CONSLTJ SURG CYTO XM 1: CPT | Performed by: INTERNAL MEDICINE

## 2025-07-18 PROCEDURE — 88342 IMHCHEM/IMCYTCHM 1ST ANTB: CPT | Performed by: INTERNAL MEDICINE

## 2025-07-18 PROCEDURE — 88313 SPECIAL STAINS GROUP 2: CPT | Performed by: INTERNAL MEDICINE

## 2025-07-18 PROCEDURE — 88237 TISSUE CULTURE BONE MARROW: CPT | Performed by: INTERNAL MEDICINE

## 2025-07-18 PROCEDURE — 88314 HISTOCHEMICAL STAINS ADD-ON: CPT | Performed by: INTERNAL MEDICINE

## 2025-07-18 PROCEDURE — 77012 CT SCAN FOR NEEDLE BIOPSY: CPT | Performed by: INTERNAL MEDICINE

## 2025-07-18 PROCEDURE — 38222 DX BONE MARROW BX & ASPIR: CPT | Performed by: INTERNAL MEDICINE

## 2025-07-18 PROCEDURE — 88305 TISSUE EXAM BY PATHOLOGIST: CPT | Performed by: INTERNAL MEDICINE

## 2025-07-18 PROCEDURE — 85027 COMPLETE CBC AUTOMATED: CPT

## 2025-07-18 PROCEDURE — 82962 GLUCOSE BLOOD TEST: CPT

## 2025-07-18 PROCEDURE — 88341 IMHCHEM/IMCYTCHM EA ADD ANTB: CPT | Performed by: INTERNAL MEDICINE

## 2025-07-18 PROCEDURE — 85097 BONE MARROW INTERPRETATION: CPT | Performed by: INTERNAL MEDICINE

## 2025-07-18 RX ORDER — NALOXONE HYDROCHLORIDE 0.4 MG/ML
INJECTION, SOLUTION INTRAMUSCULAR; INTRAVENOUS; SUBCUTANEOUS
Status: DISCONTINUED
Start: 2025-07-18 | End: 2025-07-18 | Stop reason: WASHOUT

## 2025-07-18 RX ORDER — NALOXONE HYDROCHLORIDE 0.4 MG/ML
0.08 INJECTION, SOLUTION INTRAMUSCULAR; INTRAVENOUS; SUBCUTANEOUS AS NEEDED
Status: DISCONTINUED | OUTPATIENT
Start: 2025-07-18 | End: 2025-07-18

## 2025-07-18 RX ORDER — MIDAZOLAM HYDROCHLORIDE 1 MG/ML
INJECTION INTRAMUSCULAR; INTRAVENOUS
Status: DISCONTINUED
Start: 2025-07-18 | End: 2025-07-18 | Stop reason: WASHOUT

## 2025-07-18 RX ORDER — MIDAZOLAM HYDROCHLORIDE 1 MG/ML
1 INJECTION INTRAMUSCULAR; INTRAVENOUS EVERY 5 MIN PRN
Status: DISCONTINUED | OUTPATIENT
Start: 2025-07-18 | End: 2025-07-18

## 2025-07-18 RX ORDER — SODIUM CHLORIDE 9 MG/ML
INJECTION, SOLUTION INTRAVENOUS CONTINUOUS
Status: DISCONTINUED | OUTPATIENT
Start: 2025-07-18 | End: 2025-07-18

## 2025-07-18 RX ORDER — FLUMAZENIL 0.1 MG/ML
0.2 INJECTION INTRAVENOUS AS NEEDED
Status: DISCONTINUED | OUTPATIENT
Start: 2025-07-18 | End: 2025-07-18

## 2025-07-18 RX ORDER — FLUMAZENIL 0.1 MG/ML
INJECTION INTRAVENOUS
Status: DISCONTINUED
Start: 2025-07-18 | End: 2025-07-18 | Stop reason: WASHOUT

## 2025-07-18 NOTE — IMAGING NOTE
Received pt to radiology holding.  Pt verified name and  as well as allergies.  Pt with history of Myasthenia Gravis. Pt states he would like to attempt procedure with local anesthetic only, but would like sedation option on standby.  Dr. Godinez agrees with plan. Pt states NPO since last night.  Pt states he does not take blood thinners.  Lab results of PT/INR and PLT reviewed.  Informed consent obtained by Dr. Godinez.  Pt positioned prone on scanner.  CRM and pulse ox monitoring applied, alarms enabled. Sterile prep and 1% lido to area by Dr. Godinez.  Specimens obtained and handed off to cytology tech.  Neosporin/tegaderm dressing applied to site. CDI.  Pt tolerated procedure with local only, no IV sedation/medications given. Per Dr. Godinez pt to be discharged from procedure room. Pt awake, alert and states he has no pain, and in no apparent distress.   Procedure site dressing CDI no bleeding noted. Discharge instructions reviewed with pt and wife, both verbalize understanding. Pt discharged to home with steady gait with his wife and with belongings.

## 2025-07-18 NOTE — DISCHARGE INSTRUCTIONS
Discharge/After Visit [facility] - Department of Radiology  Biopsy - Bone Marrow biopsy    Post Sedation  Follow these guidelines:  You should be watched overnight by a responsible adult. This person should make sure your condition remains stable.   Do not drink any alcohol for 24 hours after the procedure.  Don’t drive, operate dangerous machinery, make important business or personal decisions, or sign legal documents for 24 hours after the procedure.  Note: Your healthcare provider may tell you not to take any medicine by mouth for pain or sleep for a period of time. These medicines may react with the medicine you were given during your procedure. This could cause a much stronger response than usual.     Activity:  Take it easy for the rest of the day after your biopsy.   You may be sore at the site of the biopsy for the next 5 days.   Do not do any strenuous exercises or lift over five pounds for the next 24 hours.     Biopsy Site:  Keep a bandage on the biopsy site for 24 hours after the procedure.   You may shower after 24 hours, but no soaking in a bathtub for 48 hours.     Lung Biopsy: If chest pain or shortness of breath occurs, go to the nearest Emergency Room.   Liver Biopsy: If there is any increase in right-sided back, shoulder, or abdominal pain, go to the nearest Emergency Room. Call your doctor for unusual dizziness or weakness.   Renal Biopsy: Report any bloody urine, bleeding at the site, swelling, or pain to your ordering provider,      Diet: Drink plenty of fluids and resume your usual home diet. A slow return to normal foods minimizes nausea.    Pain Management:   You may use over-the-counter or prescribed pain relief medication if it is not contraindicated by another condition.     Medications:  You may resume your usual home medications. You may resume blood thinners 24 hours after the procedure if there is no bleeding from/hematoma at the puncture site or bloody urine (Renal  Biopsy only)     When to seek medical advice:  Call the provider that ordered the biopsy with any questions and to discuss test results. These may also be available in your [facility] My Chart. You may also contact the Radiology Nurse at [phone], M-F, 8-5 with any additional questions or concerns.  Dial [phone] and ask the  to page the on-call IR Radiologist if any of these occur:    A change in color or temperature of the area where the biopsy was performed.  You develop increasing pain or shortness of breath.  Unusual drowsiness, weakness, or dizziness.  Unusual vomiting.     IF YOU FEEL YOU ARE EXPERIENCING AN EMERGENCY,   CALL 911 OR GO TO THE NEAREST EMERGENCY ROOM      4.2.24 MO/  Radiology

## 2025-07-18 NOTE — PROCEDURES
[facility]   part of [facility]  Procedure Note    Silvio Garner Patient Status:  Outpatient    1960 MRN [id number]   Location [facility] CT Attending Charmaine Gustafson, DO   Hosp Day # 0 PCP Dayna Gibson MD     Procedure: CT guided bone marrow aspiration and biopsy    Pre-Procedure Diagnosis:  anemia    Post-Procedure Diagnosis: same    Anesthesia:  Local    Findings:  bloody aspirate and solid core    Specimens: 10 ml and 1 11 g core    Blood Loss:  <10 ml    Tourniquet Time: n/a  Complications:  None  Drains:  none    Secondary Diagnosis:  none    Patrick Godinez MD  2025

## 2025-07-18 NOTE — H&P
[facility]   part of [facility]   History & Physical    Silvio Garner Patient Status:  Outpatient    1960 MRN [id number]   Location [facility] CT Attending Charmaine Gustafson,    Hosp Day # 0 PCP Dayna Gibson MD     Admitting Diagnosis:   anemia      History   Past Medical History:  Past Medical History[1]    Past Surgical History:  Past Surgical History[2]    Social History:  Social History     Tobacco Use    Smoking status: Former     Current packs/day: 0.00     Average packs/day: 1 pack/day for 20.0 years (20.0 ttl pk-yrs)     Types: Cigarettes     Start date: 1978     Quit date: 1998     Years since quittin.2    Smokeless tobacco: Never   Substance Use Topics    Alcohol use: Yes     Alcohol/week: 8.0 standard drinks of alcohol     Types: 8 Standard drinks or equivalent per week     Comment: 1-2 per day during the week, 3-4 on weekends        Family History:  Family History[3]    Allergies/Medications:   Allergies:  Allergies[4]    Medications:  Medications - Current[5]    Physical Exam & Review of Systems:   Physical Exam:    /72 (BP Location: Left arm)   Pulse 65   Temp 97.2 °F (36.2 °C) (Temporal)   Resp 15   Ht 72\"   Wt 260 lb   SpO2 98%   BMI 35.26 kg/m²     General: NAD  Neck: No JVD  Lungs: CTA bilat  Heart: RRR, S1, S2  Abdomen: Soft, NT/ND, BS+x4  Extremities: Warm, dry, no LE edema bilat  Pulses: 2+ bilat DP    Results:   Labs:  Recent Labs   Lab 25  1028   RBC 4.25   HGB 11.4*   HCT 37.0*   MCV 87.1   MCH 26.8   MCHC 30.8*   RDW 23.7   WBC 5.5   .0*     Recent Labs   Lab 25  1028   PTP 14.7   INR 1.14     No results for input(s): \"GLU\", \"BUN\", \"CREATSERUM\", \"GFRAA\", \"GFRNAA\", \"CA\", \"NA\", \"K\", \"CL\", \"CO2\" in the last 168 hours.    Assessment/Plan:   Impression: anemia    Recommendations: CT guided bone marrow biopsy with local anesthesia only. Pt declines sedation.    Patrick Godinez MD  2025  11:34 AM           [1]    Past Medical History:   Anemia    Blood disorder    iron deficiency anemia    Diabetes (HCC)    from chronic steriod use    Family history of malignant hyperthermia    COUSIN HAD HISTORY    Flatulence/gas pain/belching    Heartburn    High blood pressure    History of blood transfusion    NO REACTION    Lab test positive for detection of COVID-19 virus    5/2020, subsequent testing negative    Microcytic anemia    Myasthenia gravis (HCC)    7/2020    OTHER DISEASES    Prostate nodule    Psoriasis    Sleep apnea    CPAP   [2]   Past Surgical History:  Procedure Laterality Date    Cataract Bilateral     Colonoscopy  2009    normal with diverticuli    Colonoscopy,diagnostic  09/09/2009    Performed by DEONTE TYLER at [facility], [facility]    Hand surgery Right     right thumb    Mastoidectomy,simple  11/29/2022    Other surgical history  1978    foot    Other surgical history  2009    prostate biopsy, normal    Upper gi endoscopy,biopsy  09/09/2009    duodenal ulcer   [3]   Family History  Problem Relation Age of Onset    Diabetes Father     Other (brain tumors) Father     Other (uremic poisoning) Paternal Grandfather     Pancreatic Cancer Sister         metastatic    Other (malignant hyperthermia) Maternal Cousin Male    [4] No Known Allergies  [5]   Current Outpatient Medications:     PYRIDOSTIGMINE 60 MG Oral Tab, TAKE 1 TABLET BY MOUTH FIVE TIMES DAILY, Disp: 450 tablet, Rfl: 3    Mycophenolate Mofetil 500 MG Oral Tab, TAKE 2 TABLETS BY MOUTH TWICE DAILY. WILL REVIEW BLOOD WORK BEFORE NEXT REFILL, Disp: 120 tablet, Rfl: 2    metFORMIN 500 MG Oral Tab, Take 1 tablet (500 mg total) by mouth 2 (two) times daily with meals., Disp: 180 tablet, Rfl: 1    temazepam 15 MG Oral Cap, Take 1 capsule (15 mg total) by mouth nightly as needed for Sleep., Disp: 30 capsule, Rfl: 1    pantoprazole 40 MG Oral Tab EC, Take 1 tablet (40 mg total) by mouth 2 (two) times daily before meals., Disp: 180 tablet, Rfl: 1    lisinopril  40 MG Oral Tab, Take 1 tablet (40 mg total) by mouth daily., Disp: 90 tablet, Rfl: 1    TALTZ 80 MG/ML Subcutaneous Solution Auto-injector, , Disp: , Rfl:     VITAMIN D 400 UNIT OR CAPS, Take by mouth., Disp: , Rfl:     efgartigimod jamison-fcab (VYVGART) 400 MG/20ML Intravenous Solution injection, Inject into the vein As Directed. EVERY 8 WEEK, 4 MORE SESSIONS LEFT, Disp: , Rfl:

## 2025-07-23 LAB
CD10 CELLS NFR SPEC: <1 %
CD10/CD19: <1 %
CD19 CELLS NFR SPEC: 8 %
CD19+/CD200+: 6 %
CD2 CELLS NFR SPEC: 89 %
CD20 CELLS NFR SPEC: 8 %
CD200 CELLS: 9 %
CD3 CELLS NFR SPEC: 74 %
CD3+/TCRGD+: 1 %
CD3+CD4+ CELLS NFR SPEC: 57 %
CD3+CD4+ CELLS/CD3+CD8+ CLL SPEC: 3.4
CD3+CD8+ CELLS NFR SPEC: 17 %
CD3-/CD56+: 18 %
CD34 CELLS NFR SPEC: <1 %
CD38 CELLS NFR SPEC: 12 %
CD38+/CD19+: <1 %
CD45 CELLS NFR SPEC: 100 %
CD5 CELLS NFR SPEC: 72 %
CD5/CD19 CELLS: 1 %
CD7 CELLS NFR SPEC: 86 %
CELL SURF KAPPA/LAMBDA RATIO: 1.7
CELL SURF LAMBDA LIGHT CHAIN: 3 %
CELL SURFACE KAPPA LIGHT CHAIN: 5 %
TCR G-D CELLS NFR SPEC: 1 %

## 2025-07-24 ENCOUNTER — APPOINTMENT (OUTPATIENT)
Facility: LOCATION | Age: 65
End: 2025-07-24
Attending: INTERNAL MEDICINE

## 2025-07-24 ENCOUNTER — TELEPHONE (OUTPATIENT)
Facility: LOCATION | Age: 65
End: 2025-07-24

## 2025-07-24 NOTE — TELEPHONE ENCOUNTER
Returned patient's call - reviewed [place] message from Dr. Gustafson with patient. I let him know we will know more information when his last test result comes through. I let him know that our office will contact him when results come through, and to keep appt for 8/4. He stated understanding and thanked me for the call.

## 2025-07-24 NOTE — TELEPHONE ENCOUNTER
Pt called he would like to speak with a nurse regarding his test results. Pt is aware of the  [place] message, he would like to speak with a nurse.      Please give pt a call back

## 2025-07-28 LAB
CELLS ANALYZED LEUK/LYM: 20
CELLS COUNTED LEUK/LYM: 20
CELLS KARYOTYPED LEUK/LYM: 2
GTG BAND LEUK/LYM: 400

## 2025-08-04 ENCOUNTER — NURSE ONLY (OUTPATIENT)
Facility: LOCATION | Age: 65
End: 2025-08-04
Attending: INTERNAL MEDICINE

## 2025-08-04 ENCOUNTER — OFFICE VISIT (OUTPATIENT)
Facility: LOCATION | Age: 65
End: 2025-08-04
Attending: INTERNAL MEDICINE

## 2025-08-04 VITALS
RESPIRATION RATE: 18 BRPM | WEIGHT: 261.19 LBS | HEART RATE: 83 BPM | TEMPERATURE: 98 F | DIASTOLIC BLOOD PRESSURE: 76 MMHG | SYSTOLIC BLOOD PRESSURE: 142 MMHG | OXYGEN SATURATION: 97 % | BODY MASS INDEX: 35.38 KG/M2 | HEIGHT: 72.01 IN

## 2025-08-04 DIAGNOSIS — R16.2 HEPATOSPLENOMEGALY: Primary | ICD-10-CM

## 2025-08-04 DIAGNOSIS — D50.0 IRON DEFICIENCY ANEMIA DUE TO CHRONIC BLOOD LOSS: ICD-10-CM

## 2025-08-04 LAB
ALBUMIN SERPL-MCNC: 4.6 G/DL (ref 3.2–4.8)
ALBUMIN/GLOB SERPL: 1.5 (ref 1–2)
ALP LIVER SERPL-CCNC: 115 U/L (ref 45–117)
ALT SERPL-CCNC: 76 U/L (ref 10–49)
ANION GAP SERPL CALC-SCNC: 8 MMOL/L (ref 0–18)
AST SERPL-CCNC: 75 U/L (ref ?–34)
BASOPHILS # BLD AUTO: 0.1 X10(3) UL (ref 0–0.2)
BASOPHILS NFR BLD AUTO: 1.7 %
BILIRUB SERPL-MCNC: 0.7 MG/DL (ref 0.2–1.1)
BUN BLD-MCNC: 12 MG/DL (ref 9–23)
CALCIUM BLD-MCNC: 10 MG/DL (ref 8.7–10.6)
CHLORIDE SERPL-SCNC: 100 MMOL/L (ref 98–112)
CO2 SERPL-SCNC: 30 MMOL/L (ref 21–32)
CREAT BLD-MCNC: 0.93 MG/DL (ref 0.7–1.3)
DEPRECATED HBV CORE AB SER IA-ACNC: 24 NG/ML (ref 50–336)
EGFRCR SERPLBLD CKD-EPI 2021: 90 ML/MIN/1.73M2 (ref 60–?)
EOSINOPHIL # BLD AUTO: 0.23 X10(3) UL (ref 0–0.7)
EOSINOPHIL NFR BLD AUTO: 3.9 %
ERYTHROCYTE [DISTWIDTH] IN BLOOD BY AUTOMATED COUNT: 20.5 %
GLOBULIN PLAS-MCNC: 3 G/DL (ref 2–3.5)
GLUCOSE BLD-MCNC: 229 MG/DL (ref 70–99)
HCT VFR BLD AUTO: 35 % (ref 39–53)
HGB BLD-MCNC: 11.3 G/DL (ref 13–17.5)
IMM GRANULOCYTES # BLD AUTO: 0.05 X10(3) UL (ref 0–1)
IMM GRANULOCYTES NFR BLD: 0.8 %
IRON SATN MFR SERPL: 9 % (ref 20–50)
IRON SERPL-MCNC: 37 UG/DL (ref 65–175)
LYMPHOCYTES # BLD AUTO: 0.87 X10(3) UL (ref 1–4)
LYMPHOCYTES NFR BLD AUTO: 14.7 %
MCH RBC QN AUTO: 27.6 PG (ref 26–34)
MCHC RBC AUTO-ENTMCNC: 32.3 G/DL (ref 31–37)
MCV RBC AUTO: 85.6 FL (ref 80–100)
MONOCYTES # BLD AUTO: 0.59 X10(3) UL (ref 0.1–1)
MONOCYTES NFR BLD AUTO: 10 %
NEUTROPHILS # BLD AUTO: 4.07 X10 (3) UL (ref 1.5–7.7)
NEUTROPHILS # BLD AUTO: 4.07 X10(3) UL (ref 1.5–7.7)
NEUTROPHILS NFR BLD AUTO: 68.9 %
OSMOLALITY SERPL CALC.SUM OF ELEC: 293 MOSM/KG (ref 275–295)
PLATELET # BLD AUTO: 135 10(3)UL (ref 150–450)
POTASSIUM SERPL-SCNC: 4.1 MMOL/L (ref 3.5–5.1)
PROT SERPL-MCNC: 7.6 G/DL (ref 5.7–8.2)
RBC # BLD AUTO: 4.09 X10(6)UL (ref 3.8–5.8)
SODIUM SERPL-SCNC: 138 MMOL/L (ref 136–145)
TOTAL IRON BINDING CAPACITY: 403 UG/DL (ref 250–425)
TRANSFERRIN SERPL-MCNC: 323 MG/DL (ref 215–365)
WBC # BLD AUTO: 5.9 X10(3) UL (ref 4–11)

## 2025-08-15 ENCOUNTER — OFFICE VISIT (OUTPATIENT)
Facility: LOCATION | Age: 65
End: 2025-08-15
Attending: INTERNAL MEDICINE

## 2025-08-15 VITALS
OXYGEN SATURATION: 97 % | HEIGHT: 72.01 IN | BODY MASS INDEX: 35.62 KG/M2 | WEIGHT: 263 LBS | RESPIRATION RATE: 16 BRPM | HEART RATE: 70 BPM | TEMPERATURE: 98 F | DIASTOLIC BLOOD PRESSURE: 68 MMHG | SYSTOLIC BLOOD PRESSURE: 131 MMHG

## 2025-08-15 DIAGNOSIS — D64.9 ANEMIA, UNSPECIFIED TYPE: Primary | ICD-10-CM

## 2025-08-27 ENCOUNTER — TELEPHONE (OUTPATIENT)
Dept: ORTHOPEDICS CLINIC | Facility: CLINIC | Age: 65
End: 2025-08-27

## 2025-08-27 ENCOUNTER — OFFICE VISIT (OUTPATIENT)
Dept: ORTHOPEDICS CLINIC | Facility: CLINIC | Age: 65
End: 2025-08-27

## 2025-08-27 VITALS — WEIGHT: 260 LBS | HEIGHT: 72 IN | BODY MASS INDEX: 35.21 KG/M2

## 2025-08-27 DIAGNOSIS — M18.11 ARTHRITIS OF CARPOMETACARPAL (CMC) JOINT OF RIGHT THUMB: Primary | ICD-10-CM

## 2025-08-27 PROCEDURE — 99213 OFFICE O/P EST LOW 20 MIN: CPT | Performed by: ORTHOPAEDIC SURGERY

## 2025-08-27 PROCEDURE — 20600 DRAIN/INJ JOINT/BURSA W/O US: CPT | Performed by: ORTHOPAEDIC SURGERY

## 2025-08-27 RX ORDER — BETAMETHASONE SODIUM PHOSPHATE AND BETAMETHASONE ACETATE 3; 3 MG/ML; MG/ML
6 INJECTION, SUSPENSION INTRA-ARTICULAR; INTRALESIONAL; INTRAMUSCULAR; SOFT TISSUE ONCE
Status: COMPLETED | OUTPATIENT
Start: 2025-08-27 | End: 2025-08-27

## 2025-08-27 RX ADMIN — BETAMETHASONE SODIUM PHOSPHATE AND BETAMETHASONE ACETATE 6 MG: 3; 3 INJECTION, SUSPENSION INTRA-ARTICULAR; INTRALESIONAL; INTRAMUSCULAR; SOFT TISSUE at 11:53:00

## 2025-08-28 ENCOUNTER — LAB ENCOUNTER (OUTPATIENT)
Dept: LAB | Age: 65
End: 2025-08-28
Attending: FAMILY MEDICINE

## 2025-08-28 DIAGNOSIS — E11.65 TYPE 2 DIABETES MELLITUS WITH HYPERGLYCEMIA, WITHOUT LONG-TERM CURRENT USE OF INSULIN (HCC): ICD-10-CM

## 2025-08-28 DIAGNOSIS — Z12.5 SCREENING FOR PROSTATE CANCER: ICD-10-CM

## 2025-08-28 DIAGNOSIS — Z23 NEED FOR HEPATITIS B VACCINATION: ICD-10-CM

## 2025-08-28 DIAGNOSIS — D50.9 IRON DEFICIENCY ANEMIA, UNSPECIFIED IRON DEFICIENCY ANEMIA TYPE: ICD-10-CM

## 2025-08-28 DIAGNOSIS — E78.00 HYPERCHOLESTEROLEMIA: ICD-10-CM

## 2025-08-28 LAB
ALBUMIN SERPL-MCNC: 4.9 G/DL (ref 3.2–4.8)
ALBUMIN/GLOB SERPL: 1.7 (ref 1–2)
ALP LIVER SERPL-CCNC: 110 U/L (ref 45–117)
ALT SERPL-CCNC: 70 U/L (ref 10–49)
ANION GAP SERPL CALC-SCNC: 9 MMOL/L (ref 0–18)
AST SERPL-CCNC: 74 U/L (ref ?–34)
BASOPHILS # BLD AUTO: 0.09 X10(3) UL (ref 0–0.2)
BASOPHILS NFR BLD AUTO: 0.8 %
BILIRUB SERPL-MCNC: 1 MG/DL (ref 0.2–1.1)
BILIRUB UR QL: NEGATIVE
BUN BLD-MCNC: 14 MG/DL (ref 9–23)
BUN/CREAT SERPL: 14.9 (ref 10–20)
CALCIUM BLD-MCNC: 10.4 MG/DL (ref 8.7–10.4)
CHLORIDE SERPL-SCNC: 102 MMOL/L (ref 98–112)
CHOLEST SERPL-MCNC: 185 MG/DL (ref ?–200)
CLARITY UR: CLEAR
CO2 SERPL-SCNC: 24 MMOL/L (ref 21–32)
COLOR UR: YELLOW
COMPLEXED PSA SERPL-MCNC: 0.49 NG/ML (ref ?–4)
CREAT BLD-MCNC: 0.94 MG/DL (ref 0.7–1.3)
CREAT UR-SCNC: 178.6 MG/DL
DEPRECATED HBV CORE AB SER IA-ACNC: 103 NG/ML (ref 50–336)
DEPRECATED RDW RBC AUTO: 68 FL (ref 35.1–46.3)
EGFRCR SERPLBLD CKD-EPI 2021: 89 ML/MIN/1.73M2 (ref 60–?)
EOSINOPHIL # BLD AUTO: 0.05 X10(3) UL (ref 0–0.7)
EOSINOPHIL NFR BLD AUTO: 0.4 %
ERYTHROCYTE [DISTWIDTH] IN BLOOD BY AUTOMATED COUNT: 20.4 % (ref 11–15)
EST. AVERAGE GLUCOSE BLD GHB EST-MCNC: 128 MG/DL (ref 68–126)
FASTING PATIENT LIPID ANSWER: YES
FASTING STATUS PATIENT QL REPORTED: YES
GLOBULIN PLAS-MCNC: 2.9 G/DL (ref 2–3.5)
GLUCOSE BLD-MCNC: 209 MG/DL (ref 70–99)
GLUCOSE UR-MCNC: 500 MG/DL
HBA1C MFR BLD: 6.1 % (ref ?–5.7)
HBV SURFACE AB SER QL: REACTIVE
HBV SURFACE AB SERPL IA-ACNC: 17.93 MIU/ML
HCT VFR BLD AUTO: 35.3 % (ref 39–53)
HDLC SERPL-MCNC: 43 MG/DL (ref 40–59)
HGB BLD-MCNC: 11.5 G/DL (ref 13–17.5)
HGB UR QL STRIP.AUTO: NEGATIVE
IMM GRANULOCYTES # BLD AUTO: 0.2 X10(3) UL (ref 0–1)
IMM GRANULOCYTES NFR BLD: 1.7 %
IRON SATN MFR SERPL: 22 % (ref 20–50)
IRON SERPL-MCNC: 85 UG/DL (ref 65–175)
KETONES UR-MCNC: NEGATIVE MG/DL
LDLC SERPL CALC-MCNC: 118 MG/DL (ref ?–100)
LEUKOCYTE ESTERASE UR QL STRIP.AUTO: NEGATIVE
LYMPHOCYTES # BLD AUTO: 0.91 X10(3) UL (ref 1–4)
LYMPHOCYTES NFR BLD AUTO: 7.8 %
MCH RBC QN AUTO: 29.3 PG (ref 26–34)
MCHC RBC AUTO-ENTMCNC: 32.6 G/DL (ref 31–37)
MCV RBC AUTO: 90.1 FL (ref 80–100)
MICROALBUMIN UR-MCNC: 4.5 MG/DL
MICROALBUMIN/CREAT 24H UR-RTO: 25.2 UG/MG (ref ?–30)
MONOCYTES # BLD AUTO: 1.12 X10(3) UL (ref 0.1–1)
MONOCYTES NFR BLD AUTO: 9.6 %
NEUTROPHILS # BLD AUTO: 9.24 X10 (3) UL (ref 1.5–7.7)
NEUTROPHILS # BLD AUTO: 9.24 X10(3) UL (ref 1.5–7.7)
NEUTROPHILS NFR BLD AUTO: 79.7 %
NITRITE UR QL STRIP.AUTO: NEGATIVE
NONHDLC SERPL-MCNC: 142 MG/DL (ref ?–130)
OSMOLALITY SERPL CALC.SUM OF ELEC: 287 MOSM/KG (ref 275–295)
PH UR: 5.5 (ref 5–8)
PLATELET # BLD AUTO: 152 10(3)UL (ref 150–450)
PLATELET MORPHOLOGY: NORMAL
PLATELETS.RETICULATED NFR BLD AUTO: 10.3 % (ref 0–7)
POTASSIUM SERPL-SCNC: 4.3 MMOL/L (ref 3.5–5.1)
PROT SERPL-MCNC: 7.8 G/DL (ref 5.7–8.2)
PROT UR-MCNC: 20 MG/DL
RBC # BLD AUTO: 3.92 X10(6)UL (ref 3.8–5.8)
SODIUM SERPL-SCNC: 135 MMOL/L (ref 136–145)
SP GR UR STRIP: 1.03 (ref 1–1.03)
TOTAL IRON BINDING CAPACITY: 379 UG/DL (ref 250–425)
TRANSFERRIN SERPL-MCNC: 295 MG/DL (ref 215–365)
TRIGL SERPL-MCNC: 132 MG/DL (ref 30–149)
TSI SER-ACNC: 0.98 UIU/ML (ref 0.55–4.78)
UROBILINOGEN UR STRIP-ACNC: NORMAL
VLDLC SERPL CALC-MCNC: 23 MG/DL (ref 0–30)
WBC # BLD AUTO: 11.6 X10(3) UL (ref 4–11)

## 2025-08-28 PROCEDURE — 82043 UR ALBUMIN QUANTITATIVE: CPT

## 2025-08-28 PROCEDURE — 80061 LIPID PANEL: CPT

## 2025-08-28 PROCEDURE — 84443 ASSAY THYROID STIM HORMONE: CPT

## 2025-08-28 PROCEDURE — 81001 URINALYSIS AUTO W/SCOPE: CPT

## 2025-08-28 PROCEDURE — 85025 COMPLETE CBC W/AUTO DIFF WBC: CPT

## 2025-08-28 PROCEDURE — 80053 COMPREHEN METABOLIC PANEL: CPT

## 2025-08-28 PROCEDURE — 82728 ASSAY OF FERRITIN: CPT

## 2025-08-28 PROCEDURE — 82570 ASSAY OF URINE CREATININE: CPT

## 2025-08-28 PROCEDURE — 84466 ASSAY OF TRANSFERRIN: CPT

## 2025-08-28 PROCEDURE — 83036 HEMOGLOBIN GLYCOSYLATED A1C: CPT

## 2025-08-28 PROCEDURE — 36415 COLL VENOUS BLD VENIPUNCTURE: CPT

## 2025-08-28 PROCEDURE — 86706 HEP B SURFACE ANTIBODY: CPT

## 2025-08-28 PROCEDURE — 83540 ASSAY OF IRON: CPT

## 2025-08-29 ENCOUNTER — OFFICE VISIT (OUTPATIENT)
Dept: FAMILY MEDICINE CLINIC | Facility: CLINIC | Age: 65
End: 2025-08-29

## 2025-08-29 VITALS
OXYGEN SATURATION: 98 % | BODY MASS INDEX: 34.81 KG/M2 | TEMPERATURE: 97 F | RESPIRATION RATE: 18 BRPM | WEIGHT: 257 LBS | HEIGHT: 72 IN | DIASTOLIC BLOOD PRESSURE: 83 MMHG | SYSTOLIC BLOOD PRESSURE: 135 MMHG | HEART RATE: 76 BPM

## 2025-08-29 DIAGNOSIS — E11.65 TYPE 2 DIABETES MELLITUS WITH HYPERGLYCEMIA, WITHOUT LONG-TERM CURRENT USE OF INSULIN (HCC): ICD-10-CM

## 2025-08-29 DIAGNOSIS — D72.829 LEUKOCYTOSIS, UNSPECIFIED TYPE: ICD-10-CM

## 2025-08-29 DIAGNOSIS — G47.9 SLEEP DIFFICULTIES: ICD-10-CM

## 2025-08-29 DIAGNOSIS — E78.00 HYPERCHOLESTEROLEMIA: ICD-10-CM

## 2025-08-29 DIAGNOSIS — G70.00 MYASTHENIA GRAVIS (HCC): ICD-10-CM

## 2025-08-29 DIAGNOSIS — Z00.00 PHYSICAL EXAM, ANNUAL: Primary | ICD-10-CM

## 2025-08-29 DIAGNOSIS — R79.89 ELEVATED LIVER FUNCTION TESTS: ICD-10-CM

## 2025-08-29 PROCEDURE — 99397 PER PM REEVAL EST PAT 65+ YR: CPT | Performed by: FAMILY MEDICINE

## 2025-08-29 RX ORDER — TEMAZEPAM 15 MG/1
15 CAPSULE ORAL NIGHTLY PRN
Qty: 30 CAPSULE | Refills: 1 | Status: SHIPPED | OUTPATIENT
Start: 2025-08-29

## (undated) DIAGNOSIS — G47.9 SLEEP DIFFICULTIES: ICD-10-CM

## (undated) DIAGNOSIS — G70.00 MYASTHENIA GRAVIS (HCC): ICD-10-CM

## (undated) DEVICE — UPPER EXTREMITY CDS-LF: Brand: MEDLINE INDUSTRIES, INC.

## (undated) DEVICE — GLOVE SUR 7.5 SENSICARE PI PIP CRM PWD F

## (undated) DEVICE — DISPOSABLE BIPOLAR FORCEPS 4" (10.2CM) JEWELERS, STRAIGHT 0.4MM TIP AND 12 FT. (3.6M) CABLE: Brand: KIRWAN

## (undated) DEVICE — Device

## (undated) DEVICE — SUT MCRYL 3-0 27IN ABSRB UD 19MM PS-2 3/8

## (undated) DEVICE — ADHESIVE SKIN TOP FOR WND CLSR DERMBND ADV

## (undated) DEVICE — DISPOSABLE TOURNIQUET CUFF SINGLE BLADDER, DUAL PORT AND QUICK CONNECT CONNECTOR: Brand: COLOR CUFF

## (undated) DEVICE — DRAPE STERI 1000 UTIL AD

## (undated) DEVICE — SUT PERMA- 0 18IN CT-1 NABSRB BLK CR L36MM

## (undated) DEVICE — GLOVE SUR 7.5 SENSICARE PI PIP GRN PWD F

## (undated) DEVICE — GOWN,SIRUS,FABRIC-REINFORCED,X-LARGE: Brand: MEDLINE

## (undated) DEVICE — GLOVE SUR 6.5 SENSICARE PI PIP CRM PWD F

## (undated) DEVICE — SLEEVE COMPR MD KNEE LEN SGL USE KENDALL SCD

## (undated) DEVICE — C-ARM: Brand: UNBRANDED

## (undated) DEVICE — STERILE HOOK LOCK LATEX FREE ELASTIC BANDAGE 2INX5YD: Brand: HOOK LOCK™

## (undated) DEVICE — SLING ORTH LARGE 15X8.5IN 32IN

## (undated) DEVICE — SUT MCRYL 4-0 18IN PS-2 ABSRB UD 19MM 3/8 CIR

## (undated) DEVICE — GLOVE SUR 7 SENSICARE PI PIP CRM PWD F

## (undated) DEVICE — ANTIBACTERIAL UNDYED BRAIDED (POLYGLACTIN 910), SYNTHETIC ABSORBABLE SUTURE: Brand: COATED VICRYL

## (undated) DEVICE — KIT ORTH TENODESIS DISP FOR 3X8MM SCR SYS

## (undated) DEVICE — INTENDED TO AID IN THE PASSING OF SUTURES THROUGH BONE AND SOFT TISSUE DURING ORTHOPEDIC SURGERY: Brand: HOFFEE SUTURE RETRIEVER

## (undated) DEVICE — SUT COAT VCRL+ 0 27IN UR-6 ABSRB VLT ANTIBACT

## (undated) DEVICE — MINI-BLADE®: Brand: BEAVER®

## (undated) DEVICE — PADDING CAST 4INX4YD 100% COT SFT SLF BOND

## (undated) DEVICE — SOLUTION RUBBING 4OZ 70% ISO ALC CLR

## (undated) DEVICE — SOLUTION IRRIG 1000ML 0.9% NACL USP BTL

## (undated) DEVICE — SPONGE 4X4 10PK

## (undated) NOTE — Clinical Note
FYI, TCM call made, see notes. NCM confirmed with patient that he has a HFU on 12/11/2020 at 10:30 am with the [facility], NCM changed visit type to a TCM HFU.  FYI, Patient does not have a glucometer as yet.

## (undated) NOTE — LETTER
Date: 2021    Patient Name: Diamond Fan  : 1960    To Whom It May Concern: This patient is seen in our office for a medical condition. He may return to work with part time hours and increase as tolerated.  If he develops any symptoms

## (undated) NOTE — LETTER
Date: 8/19/2024    Patient Name: Silvio Garner          To Whom it may concern:      The above patient was seen at [facility] for treatment of a medical condition and is currently under my medical care.      This patient should be excused from attending work on 08/19/24.      The patient may return to work with the following restrictions, please allow time off due to his knee pain/medical condition as needed over the next 1-2 weeks. If you have any questions please contact our office at [phone].        Sincerely,    Rasheeda Austin PA-C

## (undated) NOTE — LETTER
Date: 3/12/2025    Patient Name: Silvio Garner          To Whom it may concern:    The above patient was seen at [facility] for treatment of a medical condition.    Patient to remain off work for 6 weeks. Re-eval in 6 weeks.      Sincerely,      Remberto Hart MD   Hand, Wrist, & Elbow Surgery  [email or web address]  t: [phone]  f: [phone]

## (undated) NOTE — LETTER
Date: 10/9/2020    Patient Name: Virginie Chu          To Whom it may concern: This letter has been written at the patient's request. The above patient was seen at the [facility] for treatment of a medical condition.     This patient

## (undated) NOTE — LETTER
Patient Name: Silvio Garner        : 1960       Medical Record #: [id number]    CONSENT FOR PROCEDURES/SEDATION    Date: 2025       Time: 10:46 AM        1. I authorize the performance upon Silvio Garner the following:    _____Image guided bone marrow biopsy___________________________    2. I authorize Dr. Godinez (and whomever is designated as the doctor’s assistant), to perform the above mentioned procedures.    3. If any unforeseen conditions arise during this procedure calling for additional procedures, operations, or medications (including anesthesia and blood transfusion), I  further request and authorize the doctor to do whatever he/she deems advisable in my interest.    4. I consent to the taking and reproduction of any photographs in the course of this procedure for professional purposes.    5. I consent to the administration of such sedation as may be considered necessary or advisable by the physician responsible for this service, with the exception of  ____________________________.    6. I have been informed by my doctor of the nature and purpose of this procedure/sedation, possible alternative methods of treatment, risk involved and possible complications.      Signature of Patient:  ___________________________    Signature of person authorized to consent for patient: Relationship to patient:  ___________________________    ___________________    Witness: ____________________     Date: ______________    Provider: ____________________     Date: ______________

## (undated) NOTE — LETTER
Date: 2/17/2025    Patient Name: Silvio Garner          To Whom it may concern:    The above patient was seen at [facility] for treatment of a medical condition.    This patient is cleared to return to work 4/1/25.         Sincerely,    Yolanda Major PA-C  Hand, Wrist, & Elbow Surgery  Physician Assistant to Dr. Remberto [email or web address]  t: [phone]  f: [phone]

## (undated) NOTE — LETTER
Date: 4/21/2025    Patient Name: Silvio Garner          To Whom it may concern:    [facility].    Patient will be re-evaluated in 5-6 weeks. Patient will likely be able to return without restriction on June 2,2025.        Sincerely,      Remberto Hart MD   Hand, Wrist, & Elbow Surgery  [email or web address]  t: [phone]  f: [phone]

## (undated) NOTE — LETTER
2022       Re: Jay Bailey  : 1960    To Whom It May Concern,  This patient is being followed by my office at [facility] for treatment of a neurologic condition. There are no contraindications from Neurological standpoint for the above patient to have this surgery done. Please note this is only a Neurological clearance, which does not replace necessary clearances from other specialties such as internal medicine, Cardiology, or Pulmonary medicine. Given his diagnoses, below are special considerations for this upcoming procedure:  Preferrable avoid muscle relaxing agents, to prevent a cholinergic crisis. Anesthesia pre-op evaluation taking myasthenia gravis history into consideration is recommended. Pyridostigmine can be held on day of surgery, and restarted in the recovery period. Please do not hesitate to reach out if our office can be of any further assistance.   Sincerely,        Olena Landaverde, DO  Neurology  [facility]

## (undated) NOTE — LETTER
Date: 1/6/2021    Patient Name: Julia Mckeon          To Whom it may concern: This letter has been written at the patient's request. The above patient is established at the [facility] . The patient may return to work on Thursday 1/7/2021with the following limitations: light duty, no heavy lifting over 30 pounds. Please allow him to work 5 hours/day. Please also allow him to take 10-15 minute breaks per hour if needed. 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 This patient will also be released to work by his neurologist.     Thank you for your kind understanding.        Sincerely,        Paz Del Rosario MD

## (undated) NOTE — LETTER
Date: 10/26/2020    Patient Name: Hazel Almodovar  1/13/1960        To Whom it may concern: This letter has been written at the patient's request. The above patient was seen at the [facility] for treatment of a medical condition.     Tierra Brown

## (undated) NOTE — LETTER
24    Orthopedic Surgery   Pre-Operative Clearance Request    Patient Name:   Silvio Garner             :   1960    Surgeon: Dr. Hart             Date of Surgery: 2024    Surgical Procedure: Right thumb carpometacarpal joint arthroplasty       MUST COMPLETE ALL OF THE FOLLOWING 2-3 WEEKS PRIOR TO YOUR SURGERY TO AVOID CANCELLATION, DUE TO THE RULE THEIR WILL BE NO EXCEPTIONS!      [x]  History and Physical        [x]  Medical  Clearance                         Required pre-op testing to be ordered:  N/A                         **Please fax test results, H&P, and clearance to [phone] and to P.A[facility] at [phone]**

## (undated) NOTE — LETTER
Date: 11/19/2020    Patient Name: Tc Dwyer          To Whom it may concern: This letter has been written at the patient's request. The above patient was seen at the [facility] for treatment of a medical condition.       The karthik

## (undated) NOTE — LETTER
Date: 11/6/2020    [facility] [employer]  AttnDiedra Fore  Fax: [phone]    Patient Name: Sagneeta Giovanni    To Whom it may concern:    As this patient continues to recover, it is my opinion that he continue to remain off of work until his next office vi

## (undated) NOTE — LETTER
OUTSIDE TESTING RESULT REQUEST     IMPORTANT: FOR YOUR IMMEDIATE ATTENTION  Please FAX all test results listed below to: [phone]     Testing already done on or about: 24    * * * * If testing is NOT complete, arrange with patient A.S.A.P. * * * *      Patient Name: Silvio Garner  Surgery Date: 2024  Medical Record: [id number]  CSN: [id number]  : 1960 - A: 64 y     Sex: male  Surgeon(s):  Remberto Hart MD  Procedure: Right thumb carpometacarpal joint arthroplasty  Anesthesia Type: Regional     Surgeon: Remberto Hart MD     The following Testing and Time Line are REQUIRED PER ANESTHESIA     EKG READ AND SIGNED WITHIN   90 days      Thank You,   Sent by:AROLDO VILLAVICENCIO

## (undated) NOTE — LETTER
Date: 12/14/2020    Patient Name: Sangeeta Davila          To Whom it may concern: This letter has been written at the patient's request. The above patient was seen at the [facility] for treatment of a medical condition.     The patient

## (undated) NOTE — LETTER
Your patient was recently seen at the [facility] for a hospital follow-up visit. The visit note is attached. Please contact the clinic with any questions at [phone].     Thank you,  DUSTIN Oliver

## (undated) NOTE — LETTER
Date: 5/28/2025    Patient Name: Silvio Garner          To Whom it may concern:    The above patient was seen at [facility] for treatment of a medical condition.    Patient to remain off work as he continues to have pinch/ strength deficit compared to contralateral side. Patient to be re-evaluated in 3 months to reassess strength and determine whether patient is able to return to work without restrictions.       Sincerely,    Remberto Hart MD   Hand, Wrist, & Elbow Surgery  [email or web address]  t: [phone]  f: [phone]

## (undated) NOTE — LETTER
21          Lito Farrar  :  1960      To Whom It May Concern: This patient is seen in our office for a medical condition. He may return to work with part time hours and increase as tolerated.  If he develops any symptoms such as lig

## (undated) NOTE — Clinical Note
I have seen Silvio for his preop evaluation.  He is good to go for his surgery as scheduled.  Thank you for much for consultation.